# Patient Record
Sex: MALE | Race: WHITE | Employment: OTHER | URBAN - METROPOLITAN AREA
[De-identification: names, ages, dates, MRNs, and addresses within clinical notes are randomized per-mention and may not be internally consistent; named-entity substitution may affect disease eponyms.]

---

## 2018-12-04 ENCOUNTER — APPOINTMENT (OUTPATIENT)
Dept: CT IMAGING | Age: 83
DRG: 340 | End: 2018-12-04
Payer: MEDICARE

## 2018-12-04 ENCOUNTER — HOSPITAL ENCOUNTER (INPATIENT)
Age: 83
LOS: 3 days | Discharge: HOME | DRG: 340 | End: 2018-12-07
Attending: EMERGENCY MEDICINE | Admitting: SURGERY
Payer: MEDICARE

## 2018-12-04 DIAGNOSIS — K35.80 ACUTE APPENDICITIS, UNSPECIFIED ACUTE APPENDICITIS TYPE: Primary | ICD-10-CM

## 2018-12-04 DIAGNOSIS — E87.1 HYPONATREMIA: ICD-10-CM

## 2018-12-04 PROBLEM — K37 APPENDICITIS: Status: ACTIVE | Noted: 2018-12-04

## 2018-12-04 LAB
ALBUMIN SERPL-MCNC: 3.8 G/DL (ref 3.5–5.2)
ALP BLD-CCNC: 49 U/L (ref 40–129)
ALT SERPL-CCNC: 26 U/L (ref 0–40)
ANION GAP SERPL CALCULATED.3IONS-SCNC: 12 MMOL/L (ref 7–16)
AST SERPL-CCNC: 23 U/L (ref 0–39)
BACTERIA: ABNORMAL /HPF
BASOPHILS ABSOLUTE: 0.03 E9/L (ref 0–0.2)
BASOPHILS RELATIVE PERCENT: 0.3 % (ref 0–2)
BILIRUB SERPL-MCNC: 1.3 MG/DL (ref 0–1.2)
BILIRUBIN URINE: NEGATIVE
BLOOD, URINE: NEGATIVE
BUN BLDV-MCNC: 12 MG/DL (ref 8–23)
CALCIUM SERPL-MCNC: 8.5 MG/DL (ref 8.6–10.2)
CHLORIDE BLD-SCNC: 96 MMOL/L (ref 98–107)
CLARITY: CLEAR
CO2: 23 MMOL/L (ref 22–29)
COLOR: YELLOW
CREAT SERPL-MCNC: 1.2 MG/DL (ref 0.7–1.2)
EOSINOPHILS ABSOLUTE: 0 E9/L (ref 0.05–0.5)
EOSINOPHILS RELATIVE PERCENT: 0 % (ref 0–6)
GFR AFRICAN AMERICAN: >60
GFR NON-AFRICAN AMERICAN: 58 ML/MIN/1.73
GLUCOSE BLD-MCNC: 167 MG/DL (ref 74–99)
GLUCOSE URINE: NEGATIVE MG/DL
HCT VFR BLD CALC: 43.9 % (ref 37–54)
HEMOGLOBIN: 14.5 G/DL (ref 12.5–16.5)
IMMATURE GRANULOCYTES #: 0.05 E9/L
IMMATURE GRANULOCYTES %: 0.5 % (ref 0–5)
KETONES, URINE: 40 MG/DL
LACTIC ACID, SEPSIS: 1.7 MMOL/L (ref 0.5–1.9)
LEUKOCYTE ESTERASE, URINE: ABNORMAL
LIPASE: 20 U/L (ref 13–60)
LYMPHOCYTES ABSOLUTE: 0.95 E9/L (ref 1.5–4)
LYMPHOCYTES RELATIVE PERCENT: 10.1 % (ref 20–42)
MCH RBC QN AUTO: 30.9 PG (ref 26–35)
MCHC RBC AUTO-ENTMCNC: 33 % (ref 32–34.5)
MCV RBC AUTO: 93.6 FL (ref 80–99.9)
MONOCYTES ABSOLUTE: 0.5 E9/L (ref 0.1–0.95)
MONOCYTES RELATIVE PERCENT: 5.3 % (ref 2–12)
NEUTROPHILS ABSOLUTE: 7.83 E9/L (ref 1.8–7.3)
NEUTROPHILS RELATIVE PERCENT: 83.8 % (ref 43–80)
NITRITE, URINE: NEGATIVE
PDW BLD-RTO: 12.6 FL (ref 11.5–15)
PH UA: 6 (ref 5–9)
PLATELET # BLD: 128 E9/L (ref 130–450)
PMV BLD AUTO: 10.5 FL (ref 7–12)
POTASSIUM SERPL-SCNC: 3.8 MMOL/L (ref 3.5–5)
PROTEIN UA: NEGATIVE MG/DL
RBC # BLD: 4.69 E12/L (ref 3.8–5.8)
RBC UA: ABNORMAL /HPF (ref 0–2)
RENAL EPITHELIAL, UA: ABNORMAL /HPF
SODIUM BLD-SCNC: 131 MMOL/L (ref 132–146)
SPECIFIC GRAVITY UA: 1.02 (ref 1–1.03)
TOTAL PROTEIN: 6.3 G/DL (ref 6.4–8.3)
UROBILINOGEN, URINE: 0.2 E.U./DL
WBC # BLD: 9.4 E9/L (ref 4.5–11.5)
WBC UA: >20 /HPF (ref 0–5)
YEAST: ABNORMAL

## 2018-12-04 PROCEDURE — 80053 COMPREHEN METABOLIC PANEL: CPT

## 2018-12-04 PROCEDURE — 81001 URINALYSIS AUTO W/SCOPE: CPT

## 2018-12-04 PROCEDURE — 83690 ASSAY OF LIPASE: CPT

## 2018-12-04 PROCEDURE — 74177 CT ABD & PELVIS W/CONTRAST: CPT

## 2018-12-04 PROCEDURE — 85025 COMPLETE CBC W/AUTO DIFF WBC: CPT

## 2018-12-04 PROCEDURE — 6360000004 HC RX CONTRAST MEDICATION: Performed by: RADIOLOGY

## 2018-12-04 PROCEDURE — 36415 COLL VENOUS BLD VENIPUNCTURE: CPT

## 2018-12-04 PROCEDURE — 99285 EMERGENCY DEPT VISIT HI MDM: CPT

## 2018-12-04 PROCEDURE — 1200000000 HC SEMI PRIVATE

## 2018-12-04 PROCEDURE — 94760 N-INVAS EAR/PLS OXIMETRY 1: CPT

## 2018-12-04 PROCEDURE — 2580000003 HC RX 258: Performed by: EMERGENCY MEDICINE

## 2018-12-04 PROCEDURE — 83605 ASSAY OF LACTIC ACID: CPT

## 2018-12-04 PROCEDURE — 6360000002 HC RX W HCPCS: Performed by: EMERGENCY MEDICINE

## 2018-12-04 PROCEDURE — 2700000000 HC OXYGEN THERAPY PER DAY

## 2018-12-04 RX ADMIN — CEFTRIAXONE 1 G: 1 INJECTION, POWDER, FOR SOLUTION INTRAMUSCULAR; INTRAVENOUS at 23:38

## 2018-12-04 RX ADMIN — IOPAMIDOL 110 ML: 755 INJECTION, SOLUTION INTRAVENOUS at 22:51

## 2018-12-04 ASSESSMENT — PAIN DESCRIPTION - DESCRIPTORS: DESCRIPTORS: ACHING;CONSTANT;DISCOMFORT

## 2018-12-04 ASSESSMENT — PAIN SCALES - GENERAL: PAINLEVEL_OUTOF10: 3

## 2018-12-04 ASSESSMENT — PAIN DESCRIPTION - ONSET: ONSET: ON-GOING

## 2018-12-04 ASSESSMENT — PAIN DESCRIPTION - ORIENTATION: ORIENTATION: RIGHT

## 2018-12-04 ASSESSMENT — PAIN DESCRIPTION - LOCATION: LOCATION: FLANK;GROIN

## 2018-12-04 ASSESSMENT — PAIN DESCRIPTION - FREQUENCY: FREQUENCY: CONTINUOUS

## 2018-12-04 ASSESSMENT — PAIN DESCRIPTION - PAIN TYPE: TYPE: ACUTE PAIN

## 2018-12-04 ASSESSMENT — PAIN DESCRIPTION - PROGRESSION: CLINICAL_PROGRESSION: NOT CHANGED

## 2018-12-05 ENCOUNTER — ANESTHESIA (OUTPATIENT)
Dept: OPERATING ROOM | Age: 83
DRG: 340 | End: 2018-12-05
Payer: MEDICARE

## 2018-12-05 ENCOUNTER — APPOINTMENT (OUTPATIENT)
Dept: GENERAL RADIOLOGY | Age: 83
DRG: 340 | End: 2018-12-05
Payer: MEDICARE

## 2018-12-05 ENCOUNTER — ANESTHESIA EVENT (OUTPATIENT)
Dept: OPERATING ROOM | Age: 83
DRG: 340 | End: 2018-12-05
Payer: MEDICARE

## 2018-12-05 VITALS
DIASTOLIC BLOOD PRESSURE: 87 MMHG | RESPIRATION RATE: 8 BRPM | SYSTOLIC BLOOD PRESSURE: 152 MMHG | OXYGEN SATURATION: 96 %

## 2018-12-05 LAB
ABO/RH: NORMAL
ANION GAP SERPL CALCULATED.3IONS-SCNC: 12 MMOL/L (ref 7–16)
ANTIBODY SCREEN: NORMAL
BASOPHILS ABSOLUTE: 0.04 E9/L (ref 0–0.2)
BASOPHILS RELATIVE PERCENT: 0.3 % (ref 0–2)
BUN BLDV-MCNC: 17 MG/DL (ref 8–23)
CALCIUM SERPL-MCNC: 8.3 MG/DL (ref 8.6–10.2)
CHLORIDE BLD-SCNC: 99 MMOL/L (ref 98–107)
CO2: 25 MMOL/L (ref 22–29)
CREAT SERPL-MCNC: 1.5 MG/DL (ref 0.7–1.2)
EKG ATRIAL RATE: 84 BPM
EKG P AXIS: 12 DEGREES
EKG P-R INTERVAL: 178 MS
EKG Q-T INTERVAL: 390 MS
EKG QRS DURATION: 94 MS
EKG QTC CALCULATION (BAZETT): 460 MS
EKG R AXIS: -24 DEGREES
EKG T AXIS: 1 DEGREES
EKG VENTRICULAR RATE: 84 BPM
EOSINOPHILS ABSOLUTE: 0.05 E9/L (ref 0.05–0.5)
EOSINOPHILS RELATIVE PERCENT: 0.4 % (ref 0–6)
GFR AFRICAN AMERICAN: 54
GFR NON-AFRICAN AMERICAN: 45 ML/MIN/1.73
GLUCOSE BLD-MCNC: 155 MG/DL (ref 74–99)
HCT VFR BLD CALC: 42.5 % (ref 37–54)
HEMOGLOBIN: 13.8 G/DL (ref 12.5–16.5)
IMMATURE GRANULOCYTES #: 0.06 E9/L
IMMATURE GRANULOCYTES %: 0.5 % (ref 0–5)
LYMPHOCYTES ABSOLUTE: 2.56 E9/L (ref 1.5–4)
LYMPHOCYTES RELATIVE PERCENT: 20.3 % (ref 20–42)
MCH RBC QN AUTO: 30.7 PG (ref 26–35)
MCHC RBC AUTO-ENTMCNC: 32.5 % (ref 32–34.5)
MCV RBC AUTO: 94.7 FL (ref 80–99.9)
MONOCYTES ABSOLUTE: 1.03 E9/L (ref 0.1–0.95)
MONOCYTES RELATIVE PERCENT: 8.2 % (ref 2–12)
NEUTROPHILS ABSOLUTE: 8.86 E9/L (ref 1.8–7.3)
NEUTROPHILS RELATIVE PERCENT: 70.3 % (ref 43–80)
PDW BLD-RTO: 13 FL (ref 11.5–15)
PLATELET # BLD: 131 E9/L (ref 130–450)
PMV BLD AUTO: 10.8 FL (ref 7–12)
POTASSIUM SERPL-SCNC: 4.2 MMOL/L (ref 3.5–5)
RBC # BLD: 4.49 E12/L (ref 3.8–5.8)
SODIUM BLD-SCNC: 136 MMOL/L (ref 132–146)
WBC # BLD: 12.6 E9/L (ref 4.5–11.5)

## 2018-12-05 PROCEDURE — 2700000000 HC OXYGEN THERAPY PER DAY

## 2018-12-05 PROCEDURE — 3600000004 HC SURGERY LEVEL 4 BASE: Performed by: SURGERY

## 2018-12-05 PROCEDURE — 36415 COLL VENOUS BLD VENIPUNCTURE: CPT

## 2018-12-05 PROCEDURE — 87205 SMEAR GRAM STAIN: CPT

## 2018-12-05 PROCEDURE — 6360000002 HC RX W HCPCS: Performed by: NURSE ANESTHETIST, CERTIFIED REGISTERED

## 2018-12-05 PROCEDURE — 2720000010 HC SURG SUPPLY STERILE: Performed by: SURGERY

## 2018-12-05 PROCEDURE — 87081 CULTURE SCREEN ONLY: CPT

## 2018-12-05 PROCEDURE — 2500000003 HC RX 250 WO HCPCS: Performed by: NURSE ANESTHETIST, CERTIFIED REGISTERED

## 2018-12-05 PROCEDURE — 87015 SPECIMEN INFECT AGNT CONCNTJ: CPT

## 2018-12-05 PROCEDURE — 93005 ELECTROCARDIOGRAM TRACING: CPT

## 2018-12-05 PROCEDURE — 99223 1ST HOSP IP/OBS HIGH 75: CPT | Performed by: SURGERY

## 2018-12-05 PROCEDURE — 87070 CULTURE OTHR SPECIMN AEROBIC: CPT

## 2018-12-05 PROCEDURE — 87176 TISSUE HOMOGENIZATION CULTR: CPT

## 2018-12-05 PROCEDURE — 1200000000 HC SEMI PRIVATE

## 2018-12-05 PROCEDURE — 80048 BASIC METABOLIC PNL TOTAL CA: CPT

## 2018-12-05 PROCEDURE — 86901 BLOOD TYPING SEROLOGIC RH(D): CPT

## 2018-12-05 PROCEDURE — 2500000003 HC RX 250 WO HCPCS: Performed by: SURGERY

## 2018-12-05 PROCEDURE — 6360000002 HC RX W HCPCS: Performed by: STUDENT IN AN ORGANIZED HEALTH CARE EDUCATION/TRAINING PROGRAM

## 2018-12-05 PROCEDURE — 6360000002 HC RX W HCPCS: Performed by: INTERNAL MEDICINE

## 2018-12-05 PROCEDURE — 86900 BLOOD TYPING SEROLOGIC ABO: CPT

## 2018-12-05 PROCEDURE — 0DTJ4ZZ RESECTION OF APPENDIX, PERCUTANEOUS ENDOSCOPIC APPROACH: ICD-10-PCS | Performed by: SURGERY

## 2018-12-05 PROCEDURE — 88304 TISSUE EXAM BY PATHOLOGIST: CPT

## 2018-12-05 PROCEDURE — 6370000000 HC RX 637 (ALT 250 FOR IP): Performed by: EMERGENCY MEDICINE

## 2018-12-05 PROCEDURE — 3600000014 HC SURGERY LEVEL 4 ADDTL 15MIN: Performed by: SURGERY

## 2018-12-05 PROCEDURE — 71045 X-RAY EXAM CHEST 1 VIEW: CPT

## 2018-12-05 PROCEDURE — 86850 RBC ANTIBODY SCREEN: CPT

## 2018-12-05 PROCEDURE — 2780000010 HC IMPLANT OTHER: Performed by: SURGERY

## 2018-12-05 PROCEDURE — 3700000001 HC ADD 15 MINUTES (ANESTHESIA): Performed by: SURGERY

## 2018-12-05 PROCEDURE — 2580000003 HC RX 258: Performed by: INTERNAL MEDICINE

## 2018-12-05 PROCEDURE — 2709999900 HC NON-CHARGEABLE SUPPLY: Performed by: SURGERY

## 2018-12-05 PROCEDURE — 3700000000 HC ANESTHESIA ATTENDED CARE: Performed by: SURGERY

## 2018-12-05 PROCEDURE — 7100000001 HC PACU RECOVERY - ADDTL 15 MIN: Performed by: SURGERY

## 2018-12-05 PROCEDURE — 2580000003 HC RX 258: Performed by: SURGERY

## 2018-12-05 PROCEDURE — 2580000003 HC RX 258: Performed by: NURSE ANESTHETIST, CERTIFIED REGISTERED

## 2018-12-05 PROCEDURE — 87102 FUNGUS ISOLATION CULTURE: CPT

## 2018-12-05 PROCEDURE — 85025 COMPLETE CBC W/AUTO DIFF WBC: CPT

## 2018-12-05 PROCEDURE — 2500000003 HC RX 250 WO HCPCS: Performed by: EMERGENCY MEDICINE

## 2018-12-05 PROCEDURE — 87116 MYCOBACTERIA CULTURE: CPT

## 2018-12-05 PROCEDURE — 87075 CULTR BACTERIA EXCEPT BLOOD: CPT

## 2018-12-05 PROCEDURE — 2500000003 HC RX 250 WO HCPCS: Performed by: STUDENT IN AN ORGANIZED HEALTH CARE EDUCATION/TRAINING PROGRAM

## 2018-12-05 PROCEDURE — 87206 SMEAR FLUORESCENT/ACID STAI: CPT

## 2018-12-05 PROCEDURE — 44970 LAPAROSCOPY APPENDECTOMY: CPT | Performed by: SURGERY

## 2018-12-05 PROCEDURE — 7100000000 HC PACU RECOVERY - FIRST 15 MIN: Performed by: SURGERY

## 2018-12-05 RX ORDER — HYDROCODONE BITARTRATE AND ACETAMINOPHEN 5; 325 MG/1; MG/1
2 TABLET ORAL EVERY 4 HOURS PRN
Status: DISCONTINUED | OUTPATIENT
Start: 2018-12-05 | End: 2018-12-07 | Stop reason: HOSPADM

## 2018-12-05 RX ORDER — LIDOCAINE HYDROCHLORIDE 20 MG/ML
INJECTION, SOLUTION INFILTRATION; PERINEURAL PRN
Status: DISCONTINUED | OUTPATIENT
Start: 2018-12-05 | End: 2018-12-05 | Stop reason: SDUPTHER

## 2018-12-05 RX ORDER — ROCURONIUM BROMIDE 10 MG/ML
INJECTION, SOLUTION INTRAVENOUS PRN
Status: DISCONTINUED | OUTPATIENT
Start: 2018-12-05 | End: 2018-12-05 | Stop reason: SDUPTHER

## 2018-12-05 RX ORDER — FENTANYL CITRATE 50 UG/ML
INJECTION, SOLUTION INTRAMUSCULAR; INTRAVENOUS PRN
Status: DISCONTINUED | OUTPATIENT
Start: 2018-12-05 | End: 2018-12-05 | Stop reason: SDUPTHER

## 2018-12-05 RX ORDER — BUPIVACAINE HYDROCHLORIDE 2.5 MG/ML
INJECTION, SOLUTION EPIDURAL; INFILTRATION; INTRACAUDAL PRN
Status: DISCONTINUED | OUTPATIENT
Start: 2018-12-05 | End: 2018-12-05 | Stop reason: HOSPADM

## 2018-12-05 RX ORDER — HEPARIN SODIUM 10000 [USP'U]/ML
5000 INJECTION, SOLUTION INTRAVENOUS; SUBCUTANEOUS EVERY 8 HOURS SCHEDULED
Status: DISCONTINUED | OUTPATIENT
Start: 2018-12-05 | End: 2018-12-07 | Stop reason: HOSPADM

## 2018-12-05 RX ORDER — SODIUM CHLORIDE, SODIUM LACTATE, POTASSIUM CHLORIDE, CALCIUM CHLORIDE 600; 310; 30; 20 MG/100ML; MG/100ML; MG/100ML; MG/100ML
INJECTION, SOLUTION INTRAVENOUS CONTINUOUS PRN
Status: DISCONTINUED | OUTPATIENT
Start: 2018-12-05 | End: 2018-12-05 | Stop reason: SDUPTHER

## 2018-12-05 RX ORDER — NEOSTIGMINE METHYLSULFATE 1 MG/ML
INJECTION, SOLUTION INTRAVENOUS PRN
Status: DISCONTINUED | OUTPATIENT
Start: 2018-12-05 | End: 2018-12-05 | Stop reason: SDUPTHER

## 2018-12-05 RX ORDER — SODIUM CHLORIDE 0.9 % (FLUSH) 0.9 %
10 SYRINGE (ML) INJECTION EVERY 12 HOURS SCHEDULED
Status: DISCONTINUED | OUTPATIENT
Start: 2018-12-05 | End: 2018-12-07 | Stop reason: HOSPADM

## 2018-12-05 RX ORDER — ACETAMINOPHEN 325 MG/1
650 TABLET ORAL ONCE
Status: COMPLETED | OUTPATIENT
Start: 2018-12-05 | End: 2018-12-05

## 2018-12-05 RX ORDER — SODIUM CHLORIDE 9 MG/ML
INJECTION, SOLUTION INTRAVENOUS CONTINUOUS
Status: DISCONTINUED | OUTPATIENT
Start: 2018-12-05 | End: 2018-12-07

## 2018-12-05 RX ORDER — PROPOFOL 10 MG/ML
INJECTION, EMULSION INTRAVENOUS PRN
Status: DISCONTINUED | OUTPATIENT
Start: 2018-12-05 | End: 2018-12-05 | Stop reason: SDUPTHER

## 2018-12-05 RX ORDER — GLYCOPYRROLATE 0.2 MG/ML
INJECTION INTRAMUSCULAR; INTRAVENOUS PRN
Status: DISCONTINUED | OUTPATIENT
Start: 2018-12-05 | End: 2018-12-05 | Stop reason: SDUPTHER

## 2018-12-05 RX ORDER — ACETAMINOPHEN 325 MG/1
650 TABLET ORAL EVERY 4 HOURS PRN
Status: DISCONTINUED | OUTPATIENT
Start: 2018-12-05 | End: 2018-12-07 | Stop reason: HOSPADM

## 2018-12-05 RX ORDER — HYDROCODONE BITARTRATE AND ACETAMINOPHEN 5; 325 MG/1; MG/1
1 TABLET ORAL EVERY 4 HOURS PRN
Status: DISCONTINUED | OUTPATIENT
Start: 2018-12-05 | End: 2018-12-07 | Stop reason: HOSPADM

## 2018-12-05 RX ORDER — MORPHINE SULFATE 2 MG/ML
3 INJECTION, SOLUTION INTRAMUSCULAR; INTRAVENOUS
Status: DISCONTINUED | OUTPATIENT
Start: 2018-12-05 | End: 2018-12-07 | Stop reason: HOSPADM

## 2018-12-05 RX ORDER — MORPHINE SULFATE 2 MG/ML
2 INJECTION, SOLUTION INTRAMUSCULAR; INTRAVENOUS
Status: DISCONTINUED | OUTPATIENT
Start: 2018-12-05 | End: 2018-12-05

## 2018-12-05 RX ORDER — ONDANSETRON 2 MG/ML
4 INJECTION INTRAMUSCULAR; INTRAVENOUS EVERY 6 HOURS PRN
Status: DISCONTINUED | OUTPATIENT
Start: 2018-12-05 | End: 2018-12-07 | Stop reason: HOSPADM

## 2018-12-05 RX ORDER — MIDAZOLAM HYDROCHLORIDE 1 MG/ML
INJECTION INTRAMUSCULAR; INTRAVENOUS PRN
Status: DISCONTINUED | OUTPATIENT
Start: 2018-12-05 | End: 2018-12-05 | Stop reason: SDUPTHER

## 2018-12-05 RX ORDER — SODIUM CHLORIDE 0.9 % (FLUSH) 0.9 %
10 SYRINGE (ML) INJECTION PRN
Status: DISCONTINUED | OUTPATIENT
Start: 2018-12-05 | End: 2018-12-07 | Stop reason: HOSPADM

## 2018-12-05 RX ORDER — DEXAMETHASONE SODIUM PHOSPHATE 4 MG/ML
INJECTION, SOLUTION INTRA-ARTICULAR; INTRALESIONAL; INTRAMUSCULAR; INTRAVENOUS; SOFT TISSUE PRN
Status: DISCONTINUED | OUTPATIENT
Start: 2018-12-05 | End: 2018-12-05 | Stop reason: SDUPTHER

## 2018-12-05 RX ORDER — HYDROCODONE BITARTRATE AND ACETAMINOPHEN 5; 325 MG/1; MG/1
1 TABLET ORAL EVERY 6 HOURS PRN
Status: DISCONTINUED | OUTPATIENT
Start: 2018-12-05 | End: 2018-12-05

## 2018-12-05 RX ORDER — ONDANSETRON 2 MG/ML
INJECTION INTRAMUSCULAR; INTRAVENOUS PRN
Status: DISCONTINUED | OUTPATIENT
Start: 2018-12-05 | End: 2018-12-05 | Stop reason: SDUPTHER

## 2018-12-05 RX ADMIN — Medication 3 MG: at 15:10

## 2018-12-05 RX ADMIN — MIDAZOLAM HYDROCHLORIDE 1 MG: 1 INJECTION, SOLUTION INTRAMUSCULAR; INTRAVENOUS at 14:05

## 2018-12-05 RX ADMIN — METRONIDAZOLE 500 MG: 500 INJECTION, SOLUTION INTRAVENOUS at 23:54

## 2018-12-05 RX ADMIN — FENTANYL CITRATE 50 MCG: 50 INJECTION, SOLUTION INTRAMUSCULAR; INTRAVENOUS at 14:24

## 2018-12-05 RX ADMIN — SODIUM CHLORIDE: 9 INJECTION, SOLUTION INTRAVENOUS at 08:20

## 2018-12-05 RX ADMIN — GLYCOPYRROLATE 0.6 MG: 0.2 INJECTION, SOLUTION INTRAMUSCULAR; INTRAVENOUS at 15:10

## 2018-12-05 RX ADMIN — PROPOFOL 150 MG: 10 INJECTION, EMULSION INTRAVENOUS at 13:59

## 2018-12-05 RX ADMIN — METRONIDAZOLE 500 MG: 500 INJECTION, SOLUTION INTRAVENOUS at 16:46

## 2018-12-05 RX ADMIN — PROPOFOL 50 MG: 10 INJECTION, EMULSION INTRAVENOUS at 14:01

## 2018-12-05 RX ADMIN — METRONIDAZOLE 500 MG: 500 INJECTION, SOLUTION INTRAVENOUS at 00:25

## 2018-12-05 RX ADMIN — HEPARIN SODIUM 5000 UNITS: 10000 INJECTION INTRAVENOUS; SUBCUTANEOUS at 17:39

## 2018-12-05 RX ADMIN — SODIUM CHLORIDE, POTASSIUM CHLORIDE, SODIUM LACTATE AND CALCIUM CHLORIDE: 600; 310; 30; 20 INJECTION, SOLUTION INTRAVENOUS at 13:25

## 2018-12-05 RX ADMIN — SODIUM CHLORIDE, POTASSIUM CHLORIDE, SODIUM LACTATE AND CALCIUM CHLORIDE: 600; 310; 30; 20 INJECTION, SOLUTION INTRAVENOUS at 14:50

## 2018-12-05 RX ADMIN — FENTANYL CITRATE 50 MCG: 50 INJECTION, SOLUTION INTRAMUSCULAR; INTRAVENOUS at 14:29

## 2018-12-05 RX ADMIN — FENTANYL CITRATE 25 MCG: 50 INJECTION, SOLUTION INTRAMUSCULAR; INTRAVENOUS at 15:10

## 2018-12-05 RX ADMIN — SODIUM CHLORIDE: 9 INJECTION, SOLUTION INTRAVENOUS at 01:57

## 2018-12-05 RX ADMIN — ACETAMINOPHEN 650 MG: 325 TABLET ORAL at 19:34

## 2018-12-05 RX ADMIN — MIDAZOLAM HYDROCHLORIDE 1 MG: 1 INJECTION, SOLUTION INTRAMUSCULAR; INTRAVENOUS at 13:46

## 2018-12-05 RX ADMIN — ONDANSETRON HYDROCHLORIDE 4 MG: 2 INJECTION, SOLUTION INTRAMUSCULAR; INTRAVENOUS at 13:58

## 2018-12-05 RX ADMIN — FENTANYL CITRATE 50 MCG: 50 INJECTION, SOLUTION INTRAMUSCULAR; INTRAVENOUS at 14:17

## 2018-12-05 RX ADMIN — FENTANYL CITRATE 25 MCG: 50 INJECTION, SOLUTION INTRAMUSCULAR; INTRAVENOUS at 15:23

## 2018-12-05 RX ADMIN — DEXAMETHASONE SODIUM PHOSPHATE 10 MG: 4 INJECTION, SOLUTION INTRAMUSCULAR; INTRAVENOUS at 13:58

## 2018-12-05 RX ADMIN — FENTANYL CITRATE 50 MCG: 50 INJECTION, SOLUTION INTRAMUSCULAR; INTRAVENOUS at 13:59

## 2018-12-05 RX ADMIN — ACETAMINOPHEN 650 MG: 325 TABLET ORAL at 00:32

## 2018-12-05 RX ADMIN — ROCURONIUM BROMIDE 10 MG: 10 SOLUTION INTRAVENOUS at 14:20

## 2018-12-05 RX ADMIN — METRONIDAZOLE 500 MG: 500 INJECTION, SOLUTION INTRAVENOUS at 08:20

## 2018-12-05 RX ADMIN — CEFTRIAXONE SODIUM 2 G: 2 INJECTION, POWDER, FOR SOLUTION INTRAMUSCULAR; INTRAVENOUS at 22:52

## 2018-12-05 RX ADMIN — ROCURONIUM BROMIDE 35 MG: 10 SOLUTION INTRAVENOUS at 13:59

## 2018-12-05 RX ADMIN — LIDOCAINE HYDROCHLORIDE 100 MG: 20 INJECTION, SOLUTION INFILTRATION; PERINEURAL at 13:58

## 2018-12-05 ASSESSMENT — PULMONARY FUNCTION TESTS
PIF_VALUE: 0
PIF_VALUE: 45
PIF_VALUE: 0
PIF_VALUE: 45
PIF_VALUE: 25
PIF_VALUE: 26
PIF_VALUE: 4
PIF_VALUE: 41
PIF_VALUE: 26
PIF_VALUE: 45
PIF_VALUE: 3
PIF_VALUE: 1
PIF_VALUE: 2
PIF_VALUE: 44
PIF_VALUE: 31
PIF_VALUE: 26
PIF_VALUE: 38
PIF_VALUE: 43
PIF_VALUE: 0
PIF_VALUE: 26
PIF_VALUE: 3
PIF_VALUE: 43
PIF_VALUE: 29
PIF_VALUE: 45
PIF_VALUE: 43
PIF_VALUE: 36
PIF_VALUE: 44
PIF_VALUE: 32
PIF_VALUE: 25
PIF_VALUE: 26
PIF_VALUE: 25
PIF_VALUE: 45
PIF_VALUE: 43
PIF_VALUE: 28
PIF_VALUE: 44
PIF_VALUE: 1
PIF_VALUE: 2
PIF_VALUE: 31
PIF_VALUE: 26
PIF_VALUE: 2
PIF_VALUE: 43
PIF_VALUE: 38
PIF_VALUE: 28
PIF_VALUE: 44
PIF_VALUE: 2
PIF_VALUE: 20
PIF_VALUE: 1
PIF_VALUE: 2
PIF_VALUE: 43
PIF_VALUE: 25
PIF_VALUE: 44
PIF_VALUE: 35
PIF_VALUE: 24
PIF_VALUE: 45
PIF_VALUE: 4
PIF_VALUE: 41
PIF_VALUE: 44
PIF_VALUE: 25
PIF_VALUE: 45
PIF_VALUE: 20
PIF_VALUE: 0
PIF_VALUE: 25
PIF_VALUE: 44
PIF_VALUE: 39
PIF_VALUE: 3
PIF_VALUE: 44
PIF_VALUE: 42
PIF_VALUE: 0
PIF_VALUE: 11
PIF_VALUE: 44
PIF_VALUE: 1
PIF_VALUE: 43
PIF_VALUE: 44
PIF_VALUE: 33
PIF_VALUE: 45
PIF_VALUE: 40
PIF_VALUE: 26
PIF_VALUE: 25
PIF_VALUE: 44
PIF_VALUE: 40
PIF_VALUE: 38
PIF_VALUE: 43
PIF_VALUE: 44
PIF_VALUE: 21
PIF_VALUE: 1
PIF_VALUE: 26
PIF_VALUE: 1
PIF_VALUE: 18
PIF_VALUE: 44
PIF_VALUE: 45
PIF_VALUE: 41
PIF_VALUE: 45
PIF_VALUE: 45

## 2018-12-05 ASSESSMENT — PAIN SCALES - GENERAL
PAINLEVEL_OUTOF10: 1
PAINLEVEL_OUTOF10: 0
PAINLEVEL_OUTOF10: 3
PAINLEVEL_OUTOF10: 3
PAINLEVEL_OUTOF10: 0

## 2018-12-05 ASSESSMENT — PAIN DESCRIPTION - LOCATION
LOCATION: ABDOMEN
LOCATION: ABDOMEN

## 2018-12-05 ASSESSMENT — PAIN DESCRIPTION - PAIN TYPE
TYPE: SURGICAL PAIN
TYPE: SURGICAL PAIN

## 2018-12-05 ASSESSMENT — PAIN DESCRIPTION - FREQUENCY: FREQUENCY: CONTINUOUS

## 2018-12-05 ASSESSMENT — PAIN - FUNCTIONAL ASSESSMENT: PAIN_FUNCTIONAL_ASSESSMENT: 0-10

## 2018-12-05 ASSESSMENT — PAIN DESCRIPTION - DESCRIPTORS
DESCRIPTORS: ACHING;CONSTANT;DISCOMFORT
DESCRIPTORS: DISCOMFORT

## 2018-12-05 ASSESSMENT — PAIN DESCRIPTION - ONSET: ONSET: ON-GOING

## 2018-12-05 NOTE — H&P
Systems - History obtained from the patient  General ROS: negative for - chills or fever  Hematological and Lymphatic ROS: negative  Respiratory ROS: no cough, O2 at night  Cardiovascular ROS: no chest pain or dyspnea on exertion  Gastrointestinal ROS: positive for - abdominal pain  Genito-Urinary ROS: no dysuria, trouble voiding, or hematuria  Musculoskeletal ROS: negative        PHYSICAL EXAM:    Vitals:    12/05/18 0130   BP:    Pulse:    Resp:    Temp: 98.5 °F (36.9 °C)   SpO2:        General Appearance:  awake, alert, oriented,   Skin:  Skin color, texture, turgor normal. No rashes or lesions. Head/face:  NCAT  Eyes:  No gross abnormalities. Lungs:  No chest wall tenderness. Heart:  Heart regular rate and rhythm  Abdomen:  Soft RLQ tenderness w/ moderate voluntary guarding  Musculoskeletal  : pulses present in all extremities      LABS:  CBC  Recent Labs      12/04/18   2200   WBC  9.4   HGB  14.5   HCT  43.9   PLT  128*     BMP  Recent Labs      12/04/18 2200   NA  131*   K  3.8   CL  96*   CO2  23   BUN  12   CREATININE  1.2   CALCIUM  8.5*     Liver Function  Recent Labs      12/04/18   2200   LIPASE  20   BILITOT  1.3*   AST  23   ALT  26   ALKPHOS  49   PROT  6.3*   LABALBU  3.8     No results found for: LACTA      No results for input(s): INR, PTT in the last 72 hours. Invalid input(s): PT    RADIOLOGY    Ct Abdomen Pelvis W Iv Contrast Additional Contrast? None    Addendum Date: 12/4/2018    Initial report created on 12/4/2018 11:23:52 PM EST EXAM:  CT Abdomen and Pelvis With Intravenous Contrast EXAM DATE/TIME:  12/4/2018 9:15 PM CLINICAL HISTORY:  80years old, male; Pain; Abdominal pain;  Additional info: Flank pain, stone disease suspected TECHNIQUE:  Axial computed tomography images of the abdomen and pelvis with intravenous contrast.  All CT scans at this facility use at least one of these dose optimization techniques: automated exposure control; mA and/or kV adjustment per patient size

## 2018-12-05 NOTE — PROGRESS NOTES
Consult for ID actually going to Dr. Ryann Roa not Dr. Lily Michele per Answering Service   For perforated appendicitis purulent peritonitis.  Mario Sanchez  12/5/2018  4:59 PM

## 2018-12-05 NOTE — PATIENT CARE CONFERENCE
P Quality Flow/Interdisciplinary Rounds Progress Note        Quality Flow Rounds held on December 5, 2018    Disciplines Attending:  Bedside Nurse, ,  and Nursing Unit Leadership    BRISEYDA Limon was admitted on 12/4/2018  8:54 PM    Anticipated Discharge Date:  Expected Discharge Date: 12/08/18    Disposition:    Flavio Score:  Flavio Scale Score: 20    Readmission Risk              Risk of Unplanned Readmission:        7           Discussed patient goal for the day, patient clinical progression, and barriers to discharge. The following Goal(s) of the Day/Commitment(s) have been identified:  Adequate pain control/safety. Await surgical input for care.         Debra Page  December 5, 2018

## 2018-12-05 NOTE — ED NOTES
Bed: 16  Expected date:   Expected time:   Means of arrival:   Comments:  EMS     Lyndsey Blood RN  33/12/05 2054

## 2018-12-05 NOTE — ANESTHESIA PRE PROCEDURE
carcinoma of skin     Benign essential hypertension     Chronic kidney disease (CKD), stage III (moderate) (HCC)     Impaired fasting glucose     Osteoarthrosis involving multiple sites        Past Surgical History:        Procedure Laterality Date    SKIN BIOPSY  12/12/2005    right upper back,        Social History:    Social History   Substance Use Topics    Smoking status: Never Smoker    Smokeless tobacco: Former User     Types: Chew     Quit date: 4/10/2014    Alcohol use Yes      Comment: socially                                Counseling given: Not Answered      Vital Signs (Current):   Vitals:    12/05/18 0100 12/05/18 0130 12/05/18 0812 12/05/18 1202   BP: 124/69  (!) 121/59 (!) 108/55   Pulse: 97  89 80   Resp: 16  14 16   Temp: 100.9 °F (38.3 °C) 98.5 °F (36.9 °C) 97.9 °F (36.6 °C) 97.9 °F (36.6 °C)   TempSrc: Oral Oral Oral Temporal   SpO2: 93%  95% 91%   Weight:       Height:                                                  BP Readings from Last 3 Encounters:   12/05/18 (!) 108/55   06/10/14 138/88       NPO Status: Time of last liquid consumption: 1200                        Time of last solid consumption: 1200                        Date of last liquid consumption: 12/04/18                        Date of last solid food consumption: 12/04/18    BMI:   Wt Readings from Last 3 Encounters:   12/04/18 240 lb (108.9 kg)   06/10/14 244 lb 14.4 oz (111.1 kg)     Body mass index is 33.95 kg/m².     CBC:   Lab Results   Component Value Date    WBC 12.6 12/05/2018    RBC 4.49 12/05/2018    HGB 13.8 12/05/2018    HCT 42.5 12/05/2018    MCV 94.7 12/05/2018    RDW 13.0 12/05/2018     12/05/2018       CMP:   Lab Results   Component Value Date     12/05/2018    K 4.2 12/05/2018    CL 99 12/05/2018    CO2 25 12/05/2018    BUN 17 12/05/2018    CREATININE 1.5 12/05/2018    GFRAA 54 12/05/2018    LABGLOM 45 12/05/2018    GLUCOSE 155 12/05/2018    PROT 6.3 12/04/2018    CALCIUM 8.3

## 2018-12-06 LAB
ANION GAP SERPL CALCULATED.3IONS-SCNC: 13 MMOL/L (ref 7–16)
BASOPHILS ABSOLUTE: 0.02 E9/L (ref 0–0.2)
BASOPHILS RELATIVE PERCENT: 0.2 % (ref 0–2)
BUN BLDV-MCNC: 21 MG/DL (ref 8–23)
CALCIUM SERPL-MCNC: 8.5 MG/DL (ref 8.6–10.2)
CHLORIDE BLD-SCNC: 101 MMOL/L (ref 98–107)
CO2: 24 MMOL/L (ref 22–29)
CREAT SERPL-MCNC: 1.4 MG/DL (ref 0.7–1.2)
EOSINOPHILS ABSOLUTE: 0 E9/L (ref 0.05–0.5)
EOSINOPHILS RELATIVE PERCENT: 0 % (ref 0–6)
GFR AFRICAN AMERICAN: 58
GFR NON-AFRICAN AMERICAN: 48 ML/MIN/1.73
GLUCOSE BLD-MCNC: 150 MG/DL (ref 74–99)
GRAM STAIN ORDERABLE: NORMAL
HCT VFR BLD CALC: 40.1 % (ref 37–54)
HEMOGLOBIN: 13 G/DL (ref 12.5–16.5)
IMMATURE GRANULOCYTES #: 0.07 E9/L
IMMATURE GRANULOCYTES %: 0.6 % (ref 0–5)
LYMPHOCYTES ABSOLUTE: 1.53 E9/L (ref 1.5–4)
LYMPHOCYTES RELATIVE PERCENT: 13.9 % (ref 20–42)
MCH RBC QN AUTO: 30.8 PG (ref 26–35)
MCHC RBC AUTO-ENTMCNC: 32.4 % (ref 32–34.5)
MCV RBC AUTO: 95 FL (ref 80–99.9)
MONOCYTES ABSOLUTE: 0.74 E9/L (ref 0.1–0.95)
MONOCYTES RELATIVE PERCENT: 6.7 % (ref 2–12)
MRSA CULTURE ONLY: NORMAL
NEUTROPHILS ABSOLUTE: 8.68 E9/L (ref 1.8–7.3)
NEUTROPHILS RELATIVE PERCENT: 78.6 % (ref 43–80)
PDW BLD-RTO: 13 FL (ref 11.5–15)
PLATELET # BLD: 125 E9/L (ref 130–450)
PMV BLD AUTO: 11 FL (ref 7–12)
POTASSIUM SERPL-SCNC: 4.5 MMOL/L (ref 3.5–5)
RBC # BLD: 4.22 E12/L (ref 3.8–5.8)
SODIUM BLD-SCNC: 138 MMOL/L (ref 132–146)
WBC # BLD: 11 E9/L (ref 4.5–11.5)

## 2018-12-06 PROCEDURE — 2500000003 HC RX 250 WO HCPCS: Performed by: STUDENT IN AN ORGANIZED HEALTH CARE EDUCATION/TRAINING PROGRAM

## 2018-12-06 PROCEDURE — 6360000002 HC RX W HCPCS: Performed by: STUDENT IN AN ORGANIZED HEALTH CARE EDUCATION/TRAINING PROGRAM

## 2018-12-06 PROCEDURE — 99024 POSTOP FOLLOW-UP VISIT: CPT | Performed by: SURGERY

## 2018-12-06 PROCEDURE — 2580000003 HC RX 258: Performed by: INTERNAL MEDICINE

## 2018-12-06 PROCEDURE — 6360000002 HC RX W HCPCS: Performed by: INTERNAL MEDICINE

## 2018-12-06 PROCEDURE — 2580000003 HC RX 258: Performed by: SURGERY

## 2018-12-06 PROCEDURE — 36415 COLL VENOUS BLD VENIPUNCTURE: CPT

## 2018-12-06 PROCEDURE — 85025 COMPLETE CBC W/AUTO DIFF WBC: CPT

## 2018-12-06 PROCEDURE — 1200000000 HC SEMI PRIVATE

## 2018-12-06 PROCEDURE — 80048 BASIC METABOLIC PNL TOTAL CA: CPT

## 2018-12-06 RX ORDER — HYDROCODONE BITARTRATE AND ACETAMINOPHEN 5; 325 MG/1; MG/1
1 TABLET ORAL EVERY 6 HOURS PRN
Qty: 12 TABLET | Refills: 0 | Status: SHIPPED | OUTPATIENT
Start: 2018-12-06 | End: 2018-12-09

## 2018-12-06 RX ADMIN — HEPARIN SODIUM 5000 UNITS: 10000 INJECTION INTRAVENOUS; SUBCUTANEOUS at 14:21

## 2018-12-06 RX ADMIN — METRONIDAZOLE 500 MG: 500 INJECTION, SOLUTION INTRAVENOUS at 09:16

## 2018-12-06 RX ADMIN — HEPARIN SODIUM 5000 UNITS: 10000 INJECTION INTRAVENOUS; SUBCUTANEOUS at 06:14

## 2018-12-06 RX ADMIN — SODIUM CHLORIDE: 9 INJECTION, SOLUTION INTRAVENOUS at 09:17

## 2018-12-06 RX ADMIN — CEFTRIAXONE SODIUM 2 G: 2 INJECTION, POWDER, FOR SOLUTION INTRAMUSCULAR; INTRAVENOUS at 22:43

## 2018-12-06 RX ADMIN — HEPARIN SODIUM 5000 UNITS: 10000 INJECTION INTRAVENOUS; SUBCUTANEOUS at 22:43

## 2018-12-06 RX ADMIN — METRONIDAZOLE 500 MG: 500 INJECTION, SOLUTION INTRAVENOUS at 17:28

## 2018-12-06 ASSESSMENT — PAIN SCALES - GENERAL
PAINLEVEL_OUTOF10: 0

## 2018-12-07 ENCOUNTER — TELEPHONE (OUTPATIENT)
Dept: ADMINISTRATIVE | Age: 83
End: 2018-12-07

## 2018-12-07 VITALS
BODY MASS INDEX: 35.56 KG/M2 | DIASTOLIC BLOOD PRESSURE: 67 MMHG | HEIGHT: 71 IN | TEMPERATURE: 98.2 F | HEART RATE: 74 BPM | SYSTOLIC BLOOD PRESSURE: 128 MMHG | RESPIRATION RATE: 16 BRPM | OXYGEN SATURATION: 95 % | WEIGHT: 254 LBS

## 2018-12-07 LAB
ANION GAP SERPL CALCULATED.3IONS-SCNC: 14 MMOL/L (ref 7–16)
BASOPHILS ABSOLUTE: 0.02 E9/L (ref 0–0.2)
BASOPHILS RELATIVE PERCENT: 0.2 % (ref 0–2)
BUN BLDV-MCNC: 22 MG/DL (ref 8–23)
CALCIUM SERPL-MCNC: 7.9 MG/DL (ref 8.6–10.2)
CHLORIDE BLD-SCNC: 100 MMOL/L (ref 98–107)
CO2: 23 MMOL/L (ref 22–29)
CREAT SERPL-MCNC: 1.3 MG/DL (ref 0.7–1.2)
EOSINOPHILS ABSOLUTE: 0.07 E9/L (ref 0.05–0.5)
EOSINOPHILS RELATIVE PERCENT: 0.7 % (ref 0–6)
GFR AFRICAN AMERICAN: >60
GFR NON-AFRICAN AMERICAN: 53 ML/MIN/1.73
GLUCOSE BLD-MCNC: 138 MG/DL (ref 74–99)
HCT VFR BLD CALC: 40.1 % (ref 37–54)
HEMOGLOBIN: 13.2 G/DL (ref 12.5–16.5)
IMMATURE GRANULOCYTES #: 0.05 E9/L
IMMATURE GRANULOCYTES %: 0.5 % (ref 0–5)
LYMPHOCYTES ABSOLUTE: 2.65 E9/L (ref 1.5–4)
LYMPHOCYTES RELATIVE PERCENT: 25 % (ref 20–42)
MCH RBC QN AUTO: 31.2 PG (ref 26–35)
MCHC RBC AUTO-ENTMCNC: 32.9 % (ref 32–34.5)
MCV RBC AUTO: 94.8 FL (ref 80–99.9)
MONOCYTES ABSOLUTE: 0.89 E9/L (ref 0.1–0.95)
MONOCYTES RELATIVE PERCENT: 8.4 % (ref 2–12)
NEUTROPHILS ABSOLUTE: 6.93 E9/L (ref 1.8–7.3)
NEUTROPHILS RELATIVE PERCENT: 65.2 % (ref 43–80)
PDW BLD-RTO: 12.7 FL (ref 11.5–15)
PLATELET # BLD: 145 E9/L (ref 130–450)
PMV BLD AUTO: 11 FL (ref 7–12)
POTASSIUM SERPL-SCNC: 3.9 MMOL/L (ref 3.5–5)
RBC # BLD: 4.23 E12/L (ref 3.8–5.8)
SODIUM BLD-SCNC: 137 MMOL/L (ref 132–146)
WBC # BLD: 10.6 E9/L (ref 4.5–11.5)

## 2018-12-07 PROCEDURE — 80048 BASIC METABOLIC PNL TOTAL CA: CPT

## 2018-12-07 PROCEDURE — 85025 COMPLETE CBC W/AUTO DIFF WBC: CPT

## 2018-12-07 PROCEDURE — 6360000002 HC RX W HCPCS: Performed by: STUDENT IN AN ORGANIZED HEALTH CARE EDUCATION/TRAINING PROGRAM

## 2018-12-07 PROCEDURE — 2500000003 HC RX 250 WO HCPCS: Performed by: STUDENT IN AN ORGANIZED HEALTH CARE EDUCATION/TRAINING PROGRAM

## 2018-12-07 PROCEDURE — 6360000002 HC RX W HCPCS: Performed by: SURGERY

## 2018-12-07 PROCEDURE — 6370000000 HC RX 637 (ALT 250 FOR IP): Performed by: SURGERY

## 2018-12-07 PROCEDURE — 36415 COLL VENOUS BLD VENIPUNCTURE: CPT

## 2018-12-07 RX ORDER — METRONIDAZOLE 250 MG/1
250 TABLET ORAL 2 TIMES DAILY
Qty: 20 TABLET | Refills: 0 | Status: SHIPPED | OUTPATIENT
Start: 2018-12-07 | End: 2018-12-17

## 2018-12-07 RX ORDER — CIPROFLOXACIN 500 MG/1
500 TABLET, FILM COATED ORAL 2 TIMES DAILY
Qty: 20 TABLET | Refills: 0 | Status: SHIPPED | OUTPATIENT
Start: 2018-12-07 | End: 2018-12-17

## 2018-12-07 RX ORDER — BISACODYL 10 MG
10 SUPPOSITORY, RECTAL RECTAL ONCE
Status: COMPLETED | OUTPATIENT
Start: 2018-12-07 | End: 2018-12-07

## 2018-12-07 RX ADMIN — METRONIDAZOLE 500 MG: 500 INJECTION, SOLUTION INTRAVENOUS at 08:12

## 2018-12-07 RX ADMIN — HEPARIN SODIUM 5000 UNITS: 10000 INJECTION INTRAVENOUS; SUBCUTANEOUS at 05:53

## 2018-12-07 RX ADMIN — ONDANSETRON 4 MG: 2 INJECTION INTRAMUSCULAR; INTRAVENOUS at 12:06

## 2018-12-07 RX ADMIN — METRONIDAZOLE 500 MG: 500 INJECTION, SOLUTION INTRAVENOUS at 01:06

## 2018-12-07 RX ADMIN — HEPARIN SODIUM 5000 UNITS: 10000 INJECTION INTRAVENOUS; SUBCUTANEOUS at 13:50

## 2018-12-07 RX ADMIN — BISACODYL 10 MG: 10 SUPPOSITORY RECTAL at 08:21

## 2018-12-07 ASSESSMENT — PAIN SCALES - GENERAL: PAINLEVEL_OUTOF10: 0

## 2018-12-07 NOTE — CARE COORDINATION
12/7/2018  Social Work Discharge Planning:  SW was informed by Rigo Bennett at [facility] that they are unable to provide [facility] for Pt. ONELIA called several other [facility] agencies and was informed by Ophelia Valerio from [address] [place] [address] that their sister company, [facility] [phone]  [phone] does accept medicare and services the area of Pt if [facility] is needed. Refrral needs to be made once order is in. Electronically signed by ANA Webb on 12/7/2018 at 10:41 AM

## 2018-12-07 NOTE — PROGRESS NOTES
[address] [place] [address] [facility]  Progress Note    SUBJECTIVE:  Chief Complaint   Patient presents with    Flank Pain     R flank/groin pain, hx of kidney stones     Patient is tolerating medications. No reported adverse drug reactions. No nausea, vomiting, diarrhea. Review of systems:  As stated above in the chief complaint, otherwise negative. Medications:  Scheduled Meds:   sodium chloride flush  10 mL Intravenous 2 times per day    heparin (porcine)  5,000 Units Subcutaneous 3 times per day    metroNIDAZOLE  500 mg Intravenous Q8H    cefTRIAXone (ROCEPHIN) IV  2 g Intravenous Q24H     Continuous Infusions:  PRN Meds:magnesium hydroxide, sodium chloride flush, acetaminophen, ondansetron, morphine, HYDROcodone 5 mg - acetaminophen **OR** HYDROcodone 5 mg - acetaminophen    OBJECTIVE:  BP (!) 166/91   Pulse 79   Temp 98.4 °F (36.9 °C)   Resp 16   Ht 5' 10.5\" (1.791 m)   Wt 254 lb (115.2 kg)   SpO2 95%   BMI 35.93 kg/m²   Temp  Av.4 °F (36.9 °C)  Min: 98.2 °F (36.8 °C)  Max: 98.7 °F (37.1 °C)  Constitutional: The patient is awake, alert, and oriented. Skin: Warm and dry. No rashes were noted. HEENT: Eyes show round, and reactive pupils. No jaundice. Moist mucous membranes, no ulcerations, no thrush. Neck: Supple to movements. No lymphadenopathy. Chest: No use of accessory muscles to breathe. Symmetrical expansion. Auscultation reveals no wheezing, crackles, or rhonchi. Cardiovascular: S1 and S2 are rhythmic and regular. No murmurs appreciated. Abdomen: Positive bowel sounds to auscultation. Benign to palpation. No masses felt. No hepatosplenomegaly. Extremities: No clubbing, no cyanosis, no edema.   Lines: peripheral    Laboratory and Tests Review:  Lab Results   Component Value Date    WBC 10.6 2018    WBC 11.0 2018    WBC 12.6 (H) 2018    HGB 13.2 2018    HCT 40.1 2018    MCV 94.8 2018     2018     Lab Results Component Value Date    NEUTROABS 6.93 12/07/2018    NEUTROABS 8.68 (H) 12/06/2018    NEUTROABS 8.86 (H) 12/05/2018     No results found for: CRP  No results found for: CRPHS  No results found for: SEDRATE  Lab Results   Component Value Date    ALT 26 12/04/2018    AST 23 12/04/2018    ALKPHOS 49 12/04/2018    BILITOT 1.3 (H) 12/04/2018     Lab Results   Component Value Date     12/07/2018    K 3.9 12/07/2018     12/07/2018    CO2 23 12/07/2018    BUN 22 12/07/2018    CREATININE 1.3 12/07/2018    CREATININE 1.4 12/06/2018    CREATININE 1.5 12/05/2018    GFRAA >60 12/07/2018    LABGLOM 53 12/07/2018    GLUCOSE 138 12/07/2018    PROT 6.3 12/04/2018    LABALBU 3.8 12/04/2018    CALCIUM 7.9 12/07/2018    BILITOT 1.3 12/04/2018    ALKPHOS 49 12/04/2018    AST 23 12/04/2018    ALT 26 12/04/2018     Radiology:      Microbiology:   No new cultures  No results for input(s): BC in the last 72 hours. No results for input(s): ORG in the last 72 hours. No results for input(s): Marks Malena in the last 72 hours. No results for input(s): STREPNEUMAGU in the last 72 hours. No results for input(s): LP1UAG in the last 72 hours. No results for input(s): ASO in the last 72 hours. No results for input(s): CULTRESP in the last 72 hours. No results for input(s): LABURIN in the last 72 hours. Assessment:  · Peritonitis with acute appendicitis complicated with perforation s/p laparoscopic appendectomy on 12/5     Plan:    · Cont iv rocephin and flagyl day 3  · Can switch to po cipro and flagyl possiblyfor discharge--will see once he makes BM and takes good PO  · Monitor labs  · Sx cx neg  Will follow with you    Will follow back, please page if DC - ID to handle Mariano     Carolina Maria  9:52 AM  12/7/2018      I had a face-to-face encounter with the patient at the bedside. I agree with the nurse practitioner's note and assessment and plan as detailed above.  Necessary editing and changes made to the note by

## 2018-12-07 NOTE — CONSULTS
[address] [place] [address] [facility]    Consultation Note     Admit Date: 12/4/2018  8:54 PM    Reason for Consult:  Perforated appendix    Attending Physician:  Rhina Lee, *       Chief Complaint   Patient presents with    Flank Pain     R flank/groin pain, hx of kidney stones         History Obtained From:  For a detailed history please see previous and current available medical records. HISTORY OF PRESENT ILLNESS:             The patient is a 80 y.o. male admitted with acute R sided flank pain and ws foubd to have acute appendicitis with perforation. He underwent laparoscopic appendectomy on 12/5/18. He is passing gas and is tolerating solid food. no BM yet. Past Medical History:        Diagnosis Date    Basal cell carcinoma of skin     Benign essential hypertension     Chronic kidney disease (CKD), stage III (moderate) (HCC)     Impaired fasting glucose     Osteoarthrosis involving multiple sites      Past Surgical History:        Procedure Laterality Date    LAPAROSCOPIC APPENDECTOMY N/A 12/5/2018    LAPAROSCOPIC APPENDECTOMY performed by Rhina Lee MD at [address] [place] [address]  12/12/2005    right upper back,      Current Medications:   Scheduled Meds:   sodium chloride flush  10 mL Intravenous 2 times per day    heparin (porcine)  5,000 Units Subcutaneous 3 times per day    metroNIDAZOLE  500 mg Intravenous Q8H    cefTRIAXone (ROCEPHIN) IV  2 g Intravenous Q24H     Continuous Infusions:   sodium chloride 75 mL/hr at 12/06/18 0917     PRN Meds:sodium chloride flush, acetaminophen, ondansetron, morphine, HYDROcodone 5 mg - acetaminophen **OR** HYDROcodone 5 mg - acetaminophen    Allergies:  Ace inhibitors;  Norvasc [amlodipine besylate]; and Sulfa antibiotics    Social History:   Social History     Social History    Marital status:      Spouse name: N/A    Number of children: N/A    Years of education: N/A     Social History Main Topics    Smoking status: Never Smoker    Smokeless tobacco: Former User     Types: Chew     Quit date: 4/10/2014    Alcohol use Yes      Comment: socially    Drug use: No    Sexual activity: Not Asked     Other Topics Concern    None     Social History Narrative    None       Family History:   Family History   Problem Relation Age of Onset    Heart Disease Father     Heart Disease Son 62        myocarditis leading to heart transplant    Colon Cancer Mother 80   . Otherwise non-pertinent to the chief complaint.     REVIEW OF SYSTEMS:    14 ROS negative unless otherwise specified in the HPI    PHYSICAL EXAM:    Vitals:    BP (!) 154/78   Pulse 68   Temp 98.2 °F (36.8 °C) (Oral)   Resp 16   Ht 5' 10.5\" (1.791 m)   Wt 240 lb (108.9 kg)   SpO2 95%   BMI 33.95 kg/m²     General Appearance:    Alert, cooperative, no distress, appears stated age   Head:    Normocephalic, without obvious abnormality, atraumatic   Eyes:    PERRL, conjunctiva/corneas clear      Ears:    Normal and external ear canals, both ears   Nose:   Nares normal, septum midline, mucosa normal, no drainage    or sinus tenderness   Throat:   Lips, mucosa, and tongue normal; teeth and gums normal   Neck:   Supple, trachea midline, no adenopathy; no JVD   Lungs:     Clear to auscultation bilaterally, respirations unlabored   Chest wall:    No tenderness or deformity   Heart:    Regular rate and rhythm, S1 and S2 normal, no murmur, rub   or gallop   Abdomen:     Soft, non-tender, bowel sounds sluggish all four quadrants, drain with serosanguinous fluid,     no masses, no organomegaly   Extremities:   Extremities normal, atraumatic, no cyanosis or edema   Pulses:   2+ and symmetric all extremities   Skin:   Skin color, texture, turgor normal, no rashes or lesions       CBC+dif:  Reviewed and trend followed    Radiology:  Noted    Microbiology:  Pending      Assessment:  · Peritonitis with acute appendicitis complicated with perforation s/p

## 2018-12-08 LAB
ANAEROBIC CULTURE: ABNORMAL
ANAEROBIC CULTURE: ABNORMAL
CULTURE SURGICAL: NORMAL
GRAM STAIN RESULT: NORMAL
ORGANISM: ABNORMAL

## 2018-12-12 NOTE — DISCHARGE SUMMARY
CLINICAL HISTORY:  80years old, male; Pain; Abdominal pain; Additional info: Flank pain, stone disease suspected TECHNIQUE:  Axial computed tomography images of the abdomen and pelvis with intravenous contrast.  All CT scans at this facility use at least one of these dose optimization techniques: automated exposure control; mA and/or kV adjustment per patient size (includes targeted exams where dose is matched to clinical indication); or iterative reconstruction. Coronal and sagittal reformatted images were created and reviewed. CONTRAST:  110 ml of isovue administered intravenously. COMPARISON:  No relevant prior studies available. FINDINGS:  Lower thorax: No acute findings. ABDOMEN:  Liver:  Hepatic steatosis. Gallbladder and bile ducts:  Gallbladder is surgically absent. Pancreas: Normal.   Spleen: Normal.   Adrenals: Normal.   Kidneys and ureters:  Simple right renal cysts, largest measuring approximately 6.4 cm, for which no further evaluation is necessary. Stomach and bowel: Normal.  Appendix:  Appendix is abnormally dilated, measuring up to approximately 13 mm in maximal outer diameter. Mild appendiceal wall thickening and hyperenhancement, with moderate periappendiceal inflammatory stranding and small amount of fluid. Appendix is located in conventional position. No perforation or abscess. PELVIS:  Bladder: Unremarkable as visualized. Reproductive:  Prostate gland is enlarged, measuring 4.6 cm in AP diameter. ABDOMEN and PELVIS:  Intraperitoneal space: Normal. No free air. No significant fluid collection. Bones/joints:  Multilevel thoracolumbar spine degenerative changes. Bilateral L5 pars defects. Soft tissues:  Small bilateral fat-containing inguinal hernias, without acute complications. Vasculature:  Phleboliths within the pelvis. Atherosclerotic disease of the abdominal aorta and iliac arteries. Lymph nodes: Normal. No enlarged lymph nodes.      Acute appendicitis, without evidence of

## 2018-12-13 ENCOUNTER — OFFICE VISIT (OUTPATIENT)
Dept: SURGERY | Age: 83
End: 2018-12-13

## 2018-12-13 VITALS
DIASTOLIC BLOOD PRESSURE: 75 MMHG | WEIGHT: 239 LBS | OXYGEN SATURATION: 95 % | HEART RATE: 74 BPM | HEIGHT: 70 IN | TEMPERATURE: 98.8 F | BODY MASS INDEX: 34.22 KG/M2 | SYSTOLIC BLOOD PRESSURE: 117 MMHG | RESPIRATION RATE: 16 BRPM

## 2018-12-13 DIAGNOSIS — K35.30 ACUTE APPENDICITIS WITH LOCALIZED PERITONITIS, UNSPECIFIED WHETHER ABSCESS PRESENT, UNSPECIFIED WHETHER GANGRENE PRESENT, UNSPECIFIED WHETHER PERFORATION PRESENT: Primary | ICD-10-CM

## 2018-12-13 PROCEDURE — 99024 POSTOP FOLLOW-UP VISIT: CPT | Performed by: SURGERY

## 2018-12-13 RX ORDER — DIAPER,BRIEF,INFANT-TODD,DISP
EACH MISCELLANEOUS
Qty: 1 TUBE | Refills: 1 | Status: SHIPPED | OUTPATIENT
Start: 2018-12-13 | End: 2018-12-20

## 2019-01-07 LAB
FUNGUS (MYCOLOGY) CULTURE: NORMAL
FUNGUS STAIN: NORMAL

## 2019-01-22 LAB
AFB CULTURE (MYCOBACTERIA): NORMAL
AFB SMEAR: NORMAL

## 2020-11-27 ENCOUNTER — APPOINTMENT (OUTPATIENT)
Dept: GENERAL RADIOLOGY | Age: 85
DRG: 177 | End: 2020-11-27
Payer: MEDICARE

## 2020-11-27 ENCOUNTER — HOSPITAL ENCOUNTER (INPATIENT)
Age: 85
LOS: 13 days | Discharge: NURSING, REHAB OR LONG-TERM CARE | DRG: 177 | End: 2020-12-10
Attending: EMERGENCY MEDICINE | Admitting: INTERNAL MEDICINE
Payer: MEDICARE

## 2020-11-27 PROBLEM — U07.1 PNEUMONIA DUE TO COVID-19 VIRUS: Status: ACTIVE | Noted: 2020-11-27

## 2020-11-27 PROBLEM — J12.82 PNEUMONIA DUE TO COVID-19 VIRUS: Status: ACTIVE | Noted: 2020-11-27

## 2020-11-27 LAB
ADENOVIRUS BY PCR: NOT DETECTED
ALBUMIN SERPL-MCNC: 3.3 G/DL (ref 3.5–5.2)
ALP BLD-CCNC: 52 U/L (ref 40–129)
ALT SERPL-CCNC: 38 U/L (ref 0–40)
ANION GAP SERPL CALCULATED.3IONS-SCNC: 11 MMOL/L (ref 7–16)
APTT: 26.7 SEC (ref 24.5–35.1)
AST SERPL-CCNC: 64 U/L (ref 0–39)
BASOPHILS ABSOLUTE: 0.01 E9/L (ref 0–0.2)
BASOPHILS RELATIVE PERCENT: 0.2 % (ref 0–2)
BILIRUB SERPL-MCNC: 0.4 MG/DL (ref 0–1.2)
BORDETELLA PARAPERTUSSIS BY PCR: NOT DETECTED
BORDETELLA PERTUSSIS BY PCR: NOT DETECTED
BUN BLDV-MCNC: 15 MG/DL (ref 8–23)
C-REACTIVE PROTEIN: 5.6 MG/DL (ref 0–0.4)
CALCIUM SERPL-MCNC: 8.2 MG/DL (ref 8.6–10.2)
CHLAMYDOPHILIA PNEUMONIAE BY PCR: NOT DETECTED
CHLORIDE BLD-SCNC: 93 MMOL/L (ref 98–107)
CO2: 23 MMOL/L (ref 22–29)
CORONAVIRUS 229E BY PCR: NOT DETECTED
CORONAVIRUS HKU1 BY PCR: NOT DETECTED
CORONAVIRUS NL63 BY PCR: NOT DETECTED
CORONAVIRUS OC43 BY PCR: NOT DETECTED
CREAT SERPL-MCNC: 1.3 MG/DL (ref 0.7–1.2)
D DIMER: 688 NG/ML DDU
EKG ATRIAL RATE: 75 BPM
EKG P AXIS: 15 DEGREES
EKG P-R INTERVAL: 190 MS
EKG Q-T INTERVAL: 416 MS
EKG QRS DURATION: 108 MS
EKG QTC CALCULATION (BAZETT): 464 MS
EKG R AXIS: -32 DEGREES
EKG T AXIS: 5 DEGREES
EKG VENTRICULAR RATE: 75 BPM
EOSINOPHILS ABSOLUTE: 0 E9/L (ref 0.05–0.5)
EOSINOPHILS RELATIVE PERCENT: 0 % (ref 0–6)
FIBRINOGEN: 684 MG/DL (ref 225–540)
GFR AFRICAN AMERICAN: >60
GFR NON-AFRICAN AMERICAN: 52 ML/MIN/1.73
GLUCOSE BLD-MCNC: 191 MG/DL (ref 74–99)
HCT VFR BLD CALC: 43.3 % (ref 37–54)
HEMOGLOBIN: 14.3 G/DL (ref 12.5–16.5)
HUMAN METAPNEUMOVIRUS BY PCR: NOT DETECTED
HUMAN RHINOVIRUS/ENTEROVIRUS BY PCR: NOT DETECTED
IMMATURE GRANULOCYTES #: 0.03 E9/L
IMMATURE GRANULOCYTES %: 0.5 % (ref 0–5)
INFLUENZA A BY PCR: NOT DETECTED
INFLUENZA B BY PCR: NOT DETECTED
INR BLD: 0.9
LACTATE DEHYDROGENASE: 562 U/L (ref 135–225)
LACTIC ACID, SEPSIS: 2.9 MMOL/L (ref 0.5–1.9)
LACTIC ACID, SEPSIS: <0.2 MMOL/L (ref 0.5–1.9)
LYMPHOCYTES ABSOLUTE: 0.79 E9/L (ref 1.5–4)
LYMPHOCYTES RELATIVE PERCENT: 14.5 % (ref 20–42)
MCH RBC QN AUTO: 29.9 PG (ref 26–35)
MCHC RBC AUTO-ENTMCNC: 33 % (ref 32–34.5)
MCV RBC AUTO: 90.6 FL (ref 80–99.9)
MONOCYTES ABSOLUTE: 0.39 E9/L (ref 0.1–0.95)
MONOCYTES RELATIVE PERCENT: 7.1 % (ref 2–12)
MYCOPLASMA PNEUMONIAE BY PCR: NOT DETECTED
NEUTROPHILS ABSOLUTE: 4.24 E9/L (ref 1.8–7.3)
NEUTROPHILS RELATIVE PERCENT: 77.7 % (ref 43–80)
PARAINFLUENZA VIRUS 1 BY PCR: NOT DETECTED
PARAINFLUENZA VIRUS 2 BY PCR: NOT DETECTED
PARAINFLUENZA VIRUS 3 BY PCR: NOT DETECTED
PARAINFLUENZA VIRUS 4 BY PCR: NOT DETECTED
PDW BLD-RTO: 12.5 FL (ref 11.5–15)
PLATELET # BLD: 128 E9/L (ref 130–450)
PMV BLD AUTO: 11.1 FL (ref 7–12)
POTASSIUM REFLEX MAGNESIUM: 4 MMOL/L (ref 3.5–5)
PRO-BNP: 488 PG/ML (ref 0–450)
PROCALCITONIN: 0.19 NG/ML (ref 0–0.08)
PROTHROMBIN TIME: 10.8 SEC (ref 9.3–12.4)
RBC # BLD: 4.78 E12/L (ref 3.8–5.8)
RESPIRATORY SYNCYTIAL VIRUS BY PCR: NOT DETECTED
SARS-COV-2, PCR: DETECTED
SODIUM BLD-SCNC: 127 MMOL/L (ref 132–146)
TOTAL PROTEIN: 6.3 G/DL (ref 6.4–8.3)
TROPONIN: <0.01 NG/ML (ref 0–0.03)
WBC # BLD: 5.5 E9/L (ref 4.5–11.5)

## 2020-11-27 PROCEDURE — 84484 ASSAY OF TROPONIN QUANT: CPT

## 2020-11-27 PROCEDURE — 2060000000 HC ICU INTERMEDIATE R&B

## 2020-11-27 PROCEDURE — 6360000002 HC RX W HCPCS: Performed by: STUDENT IN AN ORGANIZED HEALTH CARE EDUCATION/TRAINING PROGRAM

## 2020-11-27 PROCEDURE — 99285 EMERGENCY DEPT VISIT HI MDM: CPT

## 2020-11-27 PROCEDURE — 96374 THER/PROPH/DIAG INJ IV PUSH: CPT

## 2020-11-27 PROCEDURE — 85610 PROTHROMBIN TIME: CPT

## 2020-11-27 PROCEDURE — 83605 ASSAY OF LACTIC ACID: CPT

## 2020-11-27 PROCEDURE — 83880 ASSAY OF NATRIURETIC PEPTIDE: CPT

## 2020-11-27 PROCEDURE — 93005 ELECTROCARDIOGRAM TRACING: CPT | Performed by: STUDENT IN AN ORGANIZED HEALTH CARE EDUCATION/TRAINING PROGRAM

## 2020-11-27 PROCEDURE — 85730 THROMBOPLASTIN TIME PARTIAL: CPT

## 2020-11-27 PROCEDURE — 2580000003 HC RX 258: Performed by: STUDENT IN AN ORGANIZED HEALTH CARE EDUCATION/TRAINING PROGRAM

## 2020-11-27 PROCEDURE — 86140 C-REACTIVE PROTEIN: CPT

## 2020-11-27 PROCEDURE — 85025 COMPLETE CBC W/AUTO DIFF WBC: CPT

## 2020-11-27 PROCEDURE — 6360000002 HC RX W HCPCS: Performed by: INTERNAL MEDICINE

## 2020-11-27 PROCEDURE — 2500000003 HC RX 250 WO HCPCS: Performed by: INTERNAL MEDICINE

## 2020-11-27 PROCEDURE — 85378 FIBRIN DEGRADE SEMIQUANT: CPT

## 2020-11-27 PROCEDURE — 80053 COMPREHEN METABOLIC PANEL: CPT

## 2020-11-27 PROCEDURE — 6370000000 HC RX 637 (ALT 250 FOR IP): Performed by: INTERNAL MEDICINE

## 2020-11-27 PROCEDURE — 87449 NOS EACH ORGANISM AG IA: CPT

## 2020-11-27 PROCEDURE — 71045 X-RAY EXAM CHEST 1 VIEW: CPT

## 2020-11-27 PROCEDURE — 6370000000 HC RX 637 (ALT 250 FOR IP): Performed by: STUDENT IN AN ORGANIZED HEALTH CARE EDUCATION/TRAINING PROGRAM

## 2020-11-27 PROCEDURE — 0202U NFCT DS 22 TRGT SARS-COV-2: CPT

## 2020-11-27 PROCEDURE — 99223 1ST HOSP IP/OBS HIGH 75: CPT | Performed by: INTERNAL MEDICINE

## 2020-11-27 PROCEDURE — 83615 LACTATE (LD) (LDH) ENZYME: CPT

## 2020-11-27 PROCEDURE — 2580000003 HC RX 258: Performed by: INTERNAL MEDICINE

## 2020-11-27 PROCEDURE — 85384 FIBRINOGEN ACTIVITY: CPT

## 2020-11-27 PROCEDURE — 84145 PROCALCITONIN (PCT): CPT

## 2020-11-27 PROCEDURE — 87040 BLOOD CULTURE FOR BACTERIA: CPT

## 2020-11-27 RX ORDER — PROMETHAZINE HYDROCHLORIDE 25 MG/1
12.5 TABLET ORAL EVERY 6 HOURS PRN
Status: DISCONTINUED | OUTPATIENT
Start: 2020-11-27 | End: 2020-12-10 | Stop reason: HOSPADM

## 2020-11-27 RX ORDER — SODIUM CHLORIDE 0.9 % (FLUSH) 0.9 %
10 SYRINGE (ML) INJECTION EVERY 12 HOURS SCHEDULED
Status: DISCONTINUED | OUTPATIENT
Start: 2020-11-27 | End: 2020-12-10 | Stop reason: HOSPADM

## 2020-11-27 RX ORDER — POLYETHYLENE GLYCOL 3350 17 G/17G
17 POWDER, FOR SOLUTION ORAL DAILY PRN
Status: DISCONTINUED | OUTPATIENT
Start: 2020-11-27 | End: 2020-12-10 | Stop reason: HOSPADM

## 2020-11-27 RX ORDER — ACETAMINOPHEN 650 MG/1
650 SUPPOSITORY RECTAL EVERY 6 HOURS PRN
Status: DISCONTINUED | OUTPATIENT
Start: 2020-11-27 | End: 2020-12-10 | Stop reason: HOSPADM

## 2020-11-27 RX ORDER — ZINC SULFATE 50(220)MG
50 CAPSULE ORAL 2 TIMES DAILY
Status: DISCONTINUED | OUTPATIENT
Start: 2020-11-27 | End: 2020-12-10 | Stop reason: HOSPADM

## 2020-11-27 RX ORDER — ACETAMINOPHEN 325 MG/1
650 TABLET ORAL EVERY 6 HOURS PRN
Status: DISCONTINUED | OUTPATIENT
Start: 2020-11-27 | End: 2020-12-10 | Stop reason: HOSPADM

## 2020-11-27 RX ORDER — ONDANSETRON 2 MG/ML
4 INJECTION INTRAMUSCULAR; INTRAVENOUS EVERY 6 HOURS PRN
Status: DISCONTINUED | OUTPATIENT
Start: 2020-11-27 | End: 2020-12-10 | Stop reason: HOSPADM

## 2020-11-27 RX ORDER — ACETAMINOPHEN 325 MG/1
650 TABLET ORAL ONCE
Status: COMPLETED | OUTPATIENT
Start: 2020-11-27 | End: 2020-11-27

## 2020-11-27 RX ORDER — 0.9 % SODIUM CHLORIDE 0.9 %
500 INTRAVENOUS SOLUTION INTRAVENOUS ONCE
Status: COMPLETED | OUTPATIENT
Start: 2020-11-27 | End: 2020-11-27

## 2020-11-27 RX ORDER — DEXAMETHASONE SODIUM PHOSPHATE 10 MG/ML
6 INJECTION, SOLUTION INTRAMUSCULAR; INTRAVENOUS ONCE
Status: COMPLETED | OUTPATIENT
Start: 2020-11-27 | End: 2020-11-27

## 2020-11-27 RX ORDER — ACETAMINOPHEN 325 MG/1
TABLET ORAL
Status: DISPENSED
Start: 2020-11-27 | End: 2020-11-27

## 2020-11-27 RX ORDER — SODIUM CHLORIDE 0.9 % (FLUSH) 0.9 %
10 SYRINGE (ML) INJECTION PRN
Status: DISCONTINUED | OUTPATIENT
Start: 2020-11-27 | End: 2020-12-10 | Stop reason: HOSPADM

## 2020-11-27 RX ORDER — VALSARTAN 320 MG/1
320 TABLET ORAL DAILY
Status: DISCONTINUED | OUTPATIENT
Start: 2020-11-27 | End: 2020-12-10

## 2020-11-27 RX ORDER — 0.9 % SODIUM CHLORIDE 0.9 %
30 INTRAVENOUS SOLUTION INTRAVENOUS PRN
Status: DISCONTINUED | OUTPATIENT
Start: 2020-11-27 | End: 2020-12-10 | Stop reason: HOSPADM

## 2020-11-27 RX ORDER — ASCORBIC ACID 500 MG
1000 TABLET ORAL DAILY
Status: DISCONTINUED | OUTPATIENT
Start: 2020-11-27 | End: 2020-12-10 | Stop reason: HOSPADM

## 2020-11-27 RX ORDER — HYDROCHLOROTHIAZIDE 12.5 MG/1
12.5 TABLET ORAL DAILY
Status: DISCONTINUED | OUTPATIENT
Start: 2020-11-27 | End: 2020-12-07

## 2020-11-27 RX ORDER — VALSARTAN AND HYDROCHLOROTHIAZIDE 320; 12.5 MG/1; MG/1
1 TABLET, FILM COATED ORAL DAILY
Status: DISCONTINUED | OUTPATIENT
Start: 2020-11-27 | End: 2020-11-27 | Stop reason: CLARIF

## 2020-11-27 RX ADMIN — REMDESIVIR 200 MG: 100 INJECTION, POWDER, LYOPHILIZED, FOR SOLUTION INTRAVENOUS at 19:53

## 2020-11-27 RX ADMIN — Medication 10 ML: at 20:08

## 2020-11-27 RX ADMIN — ACETAMINOPHEN 650 MG: 325 TABLET ORAL at 07:20

## 2020-11-27 RX ADMIN — ZINC SULFATE 220 MG (50 MG) CAPSULE 50 MG: CAPSULE at 20:07

## 2020-11-27 RX ADMIN — Medication 1000 MG: at 20:07

## 2020-11-27 RX ADMIN — VALSARTAN 320 MG: 320 TABLET ORAL at 20:27

## 2020-11-27 RX ADMIN — DEXAMETHASONE 6 MG: 4 TABLET ORAL at 20:07

## 2020-11-27 RX ADMIN — DEXAMETHASONE SODIUM PHOSPHATE 6 MG: 10 INJECTION, SOLUTION INTRAMUSCULAR; INTRAVENOUS at 07:20

## 2020-11-27 RX ADMIN — SODIUM CHLORIDE 500 ML: 9 INJECTION, SOLUTION INTRAVENOUS at 07:23

## 2020-11-27 RX ADMIN — ENOXAPARIN SODIUM 40 MG: 40 INJECTION SUBCUTANEOUS at 20:08

## 2020-11-27 RX ADMIN — HYDROCHLOROTHIAZIDE 12.5 MG: 12.5 TABLET ORAL at 20:07

## 2020-11-27 ASSESSMENT — ENCOUNTER SYMPTOMS
ABDOMINAL PAIN: 0
PHOTOPHOBIA: 0
CHEST TIGHTNESS: 0
SORE THROAT: 0
COUGH: 1
VOMITING: 0
ABDOMINAL DISTENTION: 0
NAUSEA: 0
SPUTUM PRODUCTION: 0
SHORTNESS OF BREATH: 1
WHEEZING: 0
DIARRHEA: 0

## 2020-11-27 ASSESSMENT — PAIN SCALES - GENERAL
PAINLEVEL_OUTOF10: 6
PAINLEVEL_OUTOF10: 8
PAINLEVEL_OUTOF10: 0

## 2020-11-27 ASSESSMENT — PAIN DESCRIPTION - LOCATION: LOCATION: HEAD;CHEST

## 2020-11-27 ASSESSMENT — PAIN DESCRIPTION - DESCRIPTORS: DESCRIPTORS: ACHING;CONSTANT;DISCOMFORT

## 2020-11-27 ASSESSMENT — PAIN DESCRIPTION - PAIN TYPE: TYPE: ACUTE PAIN

## 2020-11-27 ASSESSMENT — PAIN DESCRIPTION - ONSET: ONSET: SUDDEN

## 2020-11-27 ASSESSMENT — PAIN DESCRIPTION - FREQUENCY: FREQUENCY: CONTINUOUS

## 2020-11-27 ASSESSMENT — PAIN DESCRIPTION - PROGRESSION: CLINICAL_PROGRESSION: GRADUALLY WORSENING

## 2020-11-27 NOTE — ED PROVIDER NOTES
Pranav Varela is an 80-year-old male with PMH of HTN who presented to emergency department with shortness of breath that has been present for about a week and a half and is worsening. Patient has had subjective fevers and chills. Patient has had decreased appetite has had a 10 pound weight loss over the last week and a half. Patient is unable to eat or drink much. Patient typically uses CPAP at night for DILLAN. Patient has been using his CPAP during the day as his oxygen levels have been low. Patient is having chest pain from coughing. Patient denies nausea vomiting or abdominal pain. Patient was tested for Covid outpatient testing this past Wednesday. Patient does not smoke. Patient drinks alcohol occationally. The history is provided by the patient and medical records. Shortness of Breath   Severity:  Moderate  Onset quality:  Gradual  Duration:  10 days  Timing:  Constant  Progression:  Worsening  Context: URI    Relieved by:  Nothing  Worsened by:  Nothing  Ineffective treatments:  Rest and oxygen  Associated symptoms: chest pain, cough and fever    Associated symptoms: no abdominal pain, no diaphoresis, no headaches, no neck pain, no rash, no sore throat, no sputum production, no vomiting and no wheezing    Risk factors: no hx of PE/DVT, no prolonged immobilization, no recent surgery and no tobacco use         Review of Systems   Constitutional: Positive for appetite change, chills, fatigue and fever. Negative for diaphoresis. HENT: Negative for sore throat. Eyes: Negative for photophobia and visual disturbance. Respiratory: Positive for cough and shortness of breath. Negative for sputum production, chest tightness and wheezing. Cardiovascular: Positive for chest pain and leg swelling. Negative for palpitations. Gastrointestinal: Negative for abdominal distention, abdominal pain, diarrhea, nausea and vomiting. Genitourinary: Negative for dysuria.    Musculoskeletal: Negative for neck pain and neck stiffness. Skin: Negative for pallor and rash. Allergic/Immunologic: Negative for immunocompromised state. Neurological: Negative for headaches. Psychiatric/Behavioral: Negative for confusion. Physical Exam  Vitals signs and nursing note reviewed. Constitutional:       Appearance: Normal appearance. He is well-developed. He is ill-appearing. He is not diaphoretic. HENT:      Head: Normocephalic and atraumatic. Eyes:      Pupils: Pupils are equal, round, and reactive to light. Neck:      Musculoskeletal: Normal range of motion and neck supple. No neck rigidity or muscular tenderness. Cardiovascular:      Rate and Rhythm: Normal rate and regular rhythm. Pulmonary:      Effort: Pulmonary effort is normal. No respiratory distress. Breath sounds: No stridor. Comments: Mild coarse lung sounds    Chest:      Chest wall: No tenderness. Abdominal:      General: Bowel sounds are normal. There is no distension. Palpations: Abdomen is soft. Tenderness: There is no abdominal tenderness. There is no guarding or rebound. Musculoskeletal:      Right lower leg: Edema present. Left lower leg: Edema present. Skin:     General: Skin is warm and dry. Capillary Refill: Capillary refill takes less than 2 seconds. Coloration: Skin is not pale. Findings: No erythema or rash. Neurological:      Mental Status: He is alert and oriented to person, place, and time. Psychiatric:         Mood and Affect: Mood normal.          Procedures     MDM  Number of Diagnoses or Management Options  Acute respiratory failure with hypoxia ([address]): Hyponatremia:   Pneumonia due to COVID-19 virus:   Diagnosis management comments: Mica Rolon is an 80-year-old male who presented to the emergency room with shortness of breath. Patient was 88 to 89% on room air at rest at arrival to emergency department. Patient was 86% on RA in triage.   Patient was placed on 3 L nasal cannula with improvement of oxygen to 95%. Patient is febrile with a temperature of 100.6. Patient was given tylenol, decadron, IVF for symptoms with mild improvement. Patient has CPAP at home but is not on home oxygen. Patient will be admitted for acute respiratory failure secondary to covid infection  Discussed results and plan with patient he is agreeable to admission. Patient is stable and well appearing at time of evaluation not in any distress. ED Course as of Nov 27 1240 Fri Nov 27, 2020   1211 XR CHEST PORTABLE [JN]      ED Course User Index  [JN] Jillian Quinteros DO        ED Course as of Nov 27 2017 Fri Nov 27, 2020   1211 XR CHEST PORTABLE [JN]      ED Course User Index  [JN] Jillian Quinteros, DO       --------------------------------------------- PAST HISTORY ---------------------------------------------  Past Medical History:  has a past medical history of Basal cell carcinoma of skin, Benign essential hypertension, Chronic kidney disease (CKD), stage III (moderate), Impaired fasting glucose, and Osteoarthrosis involving multiple sites. Past Surgical History:  has a past surgical history that includes skin biopsy (12/12/2005) and laparoscopic appendectomy (N/A, 12/5/2018). Social History:  reports that he has never smoked. He quit smokeless tobacco use about 6 years ago. His smokeless tobacco use included chew. He reports current alcohol use. He reports that he does not use drugs. Family History: family history includes Colon Cancer (age of onset: 80) in his mother; Heart Disease in his father; Heart Disease (age of onset: 62) in his son. The patients home medications have been reviewed. Allergies: Ace inhibitors;  Norvasc [amlodipine besylate]; and Sulfa antibiotics    -------------------------------------------------- RESULTS -------------------------------------------------    LABS:  Results for orders placed or performed during the hospital encounter of 11/27/20 Respiratory Panel, Molecular, with COVID-19 (Restricted: peds pts or suitable admitted adults)    Specimen: Nasopharyngeal   Result Value Ref Range    Adenovirus by PCR Not Detected Not Detected    Bordetella parapertussis by PCR Not Detected Not Detected    Bordetella pertussis by PCR Not Detected Not Detected    Chlamydophilia pneumoniae by PCR Not Detected Not Detected    Coronavirus 229E by PCR Not Detected Not Detected    Coronavirus HKU1 by PCR Not Detected Not Detected    Coronavirus NL63 by PCR Not Detected Not Detected    Coronavirus OC43 by PCR Not Detected Not Detected    SARS-CoV-2, PCR DETECTED (A) Not Detected    Human Metapneumovirus by PCR Not Detected Not Detected    Human Rhinovirus/Enterovirus by PCR Not Detected Not Detected    Influenza A by PCR Not Detected Not Detected    Influenza B by PCR Not Detected Not Detected    Mycoplasma pneumoniae by PCR Not Detected Not Detected    Parainfluenza Virus 1 by PCR Not Detected Not Detected    Parainfluenza Virus 2 by PCR Not Detected Not Detected    Parainfluenza Virus 3 by PCR Not Detected Not Detected    Parainfluenza Virus 4 by PCR Not Detected Not Detected    Respiratory Syncytial Virus by PCR Not Detected Not Detected   Lactate, Sepsis   Result Value Ref Range    Lactic Acid, Sepsis <0.2 (L) 0.5 - 1.9 mmol/L   Lactate, Sepsis   Result Value Ref Range    Lactic Acid, Sepsis 2.9 (H) 0.5 - 1.9 mmol/L   CBC auto differential   Result Value Ref Range    WBC 5.5 4.5 - 11.5 E9/L    RBC 4.78 3.80 - 5.80 E12/L    Hemoglobin 14.3 12.5 - 16.5 g/dL    Hematocrit 43.3 37.0 - 54.0 %    MCV 90.6 80.0 - 99.9 fL    MCH 29.9 26.0 - 35.0 pg    MCHC 33.0 32.0 - 34.5 %    RDW 12.5 11.5 - 15.0 fL    Platelets 036 (L) 732 - 450 E9/L    MPV 11.1 7.0 - 12.0 fL    Neutrophils % 77.7 43.0 - 80.0 %    Immature Granulocytes % 0.5 0.0 - 5.0 %    Lymphocytes % 14.5 (L) 20.0 - 42.0 %    Monocytes % 7.1 2.0 - 12.0 %    Eosinophils % 0.0 0.0 - 6.0 %    Basophils % 0.2 0.0 - 2.0 % Neutrophils Absolute 4.24 1.80 - 7.30 E9/L    Immature Granulocytes # 0.03 E9/L    Lymphocytes Absolute 0.79 (L) 1.50 - 4.00 E9/L    Monocytes Absolute 0.39 0.10 - 0.95 E9/L    Eosinophils Absolute 0.00 (L) 0.05 - 0.50 E9/L    Basophils Absolute 0.01 0.00 - 0.20 E9/L   Comprehensive Metabolic Panel w/ Reflex to MG   Result Value Ref Range    Sodium 127 (L) 132 - 146 mmol/L    Potassium reflex Magnesium 4.0 3.5 - 5.0 mmol/L    Chloride 93 (L) 98 - 107 mmol/L    CO2 23 22 - 29 mmol/L    Anion Gap 11 7 - 16 mmol/L    Glucose 191 (H) 74 - 99 mg/dL    BUN 15 8 - 23 mg/dL    CREATININE 1.3 (H) 0.7 - 1.2 mg/dL    GFR Non-African American 52 >=60 mL/min/1.73    GFR African American >60     Calcium 8.2 (L) 8.6 - 10.2 mg/dL    Total Protein 6.3 (L) 6.4 - 8.3 g/dL    Alb 3.3 (L) 3.5 - 5.2 g/dL    Total Bilirubin 0.4 0.0 - 1.2 mg/dL    Alkaline Phosphatase 52 40 - 129 U/L    ALT 38 0 - 40 U/L    AST 64 (H) 0 - 39 U/L   Troponin   Result Value Ref Range    Troponin <0.01 0.00 - 0.03 ng/mL   Brain Natriuretic Peptide   Result Value Ref Range    Pro- (H) 0 - 450 pg/mL   Lactate Dehydrogenase   Result Value Ref Range     (H) 135 - 225 U/L   Fibrinogen   Result Value Ref Range    Fibrinogen 684 (H) 225 - 540 mg/dL   Protime-INR   Result Value Ref Range    Protime 10.8 9.3 - 12.4 sec    INR 0.9    APTT   Result Value Ref Range    aPTT 26.7 24.5 - 35.1 sec   D-Dimer, Quantitative   Result Value Ref Range    D-Dimer, Quant 688 ng/mL DDU   EKG 12 Lead   Result Value Ref Range    Ventricular Rate 75 BPM    Atrial Rate 75 BPM    P-R Interval 190 ms    QRS Duration 108 ms    Q-T Interval 416 ms    QTc Calculation (Bazett) 464 ms    P Axis 15 degrees    R Axis -32 degrees    T Axis 5 degrees       RADIOLOGY:  XR CHEST PORTABLE   Final Result   Bilateral airspace opacity may represent infectious process in the proper   clinical setting. EKG: This EKG is signed and interpreted by me.     Rate: 75  Rhythm: patient. This patient's ED course included: a personal history and physicial examination, re-evaluation prior to disposition, multiple bedside re-evaluations, IV medications and cardiac monitoring    This patient has remained hemodynamically stable during their ED course. Diagnosis:  1. Pneumonia due to COVID-19 virus    2. Hyponatremia    3. Acute respiratory failure with hypoxia (HCC)        Disposition:  Patient's disposition: Admit to telemetry  Patient's condition is stable.             Mj Lezama MD  Resident  11/27/20 7532

## 2020-11-27 NOTE — H&P
[facility] History and Physical    Admission Date  11/27/2020  6:21 AM  Chief Complaint Shortness of breath  Admit Dx   Pneumonia due to COVID-19 virus [U07.1, J12.89]    Subjective  History of Present Illness  Pt is an 86M w PMH HTN, CKD III, questionable COPD (wears 2.5-3LNC nightly) who presented to ED today with SOB worsening over the last ~10 days. Admits to fevers, denies fevers / chills / chest pain / nausea / diarrhea. Currently with SpO2 95% on 5LNC. In no distress. Review of Systems - 12-point review of systems has been reviewed and is otherwise negative except as listed in the HPI    Past Medical History:   Diagnosis Date    Basal cell carcinoma of skin     Benign essential hypertension     Chronic kidney disease (CKD), stage III (moderate)     Impaired fasting glucose     Osteoarthrosis involving multiple sites      Past Surgical History:   Procedure Laterality Date    LAPAROSCOPIC APPENDECTOMY N/A 12/5/2018    LAPAROSCOPIC APPENDECTOMY performed by Charlie Caballero MD at [address] [place] [address]  12/12/2005    right upper back,      Prior to Admission medications    Medication Sig Start Date End Date Taking? Authorizing Provider   valsartan-hydrochlorothiazide (DIOVAN-HCT) 320-12.5 MG per tablet Take 1 tablet by mouth daily. Historical Provider, MD     Allergies  Ace inhibitors; Norvasc [amlodipine besylate]; and Sulfa antibiotics    Social History   reports that he has never smoked. He quit smokeless tobacco use about 6 years ago. His smokeless tobacco use included chew. He reports current alcohol use. He reports that he does not use drugs. Family History  family history includes Colon Cancer (age of onset: 80) in his mother; Heart Disease in his father; Heart Disease (age of onset: 62) in his son.     Objective  Physical Exam   General: well-developed, well-nourished, no acute distress, cooperative  Skin: warm, dry, intact, normal color without

## 2020-11-27 NOTE — LETTER
Whats most important for you to know is that your Medicare rights and benefits wont change because your health care provider is participating in [address] [place]. Medicare will keep covering all of your medically necessary services. Even though Medicare will pay your doctor in a different way under [employer], how much you have to pay wont change. Health care providers and suppliers who are enrolled in Medicare will submit their Medicare claims like they always have. Youll have all the same Medicare rights and protections, including the right to choose which hospital, doctor, or other health care provider you see. If you dont want to get care from a health care provider whos participating in [address] [place], then youll have to choose a different health care provider whos not participating in the Model. How can I give feedback about my health care? Medicare might ask you to take a voluntary survey about the services and care you received from [address] [place] [address] during your hospital stay or outpatient procedure and for a specific period of time afterwards. You can decide whether you want to take the voluntary survey, but if you do, itll help Medicare make [employer] and the care of other Medicare patients better. If you have concerns or complaints about your care, you can:   · Talk to your doctor or health care provider. · Contact your Beneficiary and Family Centered Care Quality Improvement   Organization [facility]). You can get your [employer]s phone number  at  Medicare.gov/contacts or by calling 1-800-MEDICARE. TTY users can call  8-946.245.6012. Where can I learn more about [employer]? Learn more about [employer] at https://innovation.cms.gov/initiatives/bpci-advanced/:  · A list of all the hospitals and physician group practices in the country participating in [address] [place]. · All of the inpatient and outpatient Clinical Episodes that are currently included under [employer]. A Clinical Episode is a grouping of medical conditions or diagnoses that are included in the [address] [facility].

## 2020-11-28 LAB
ALBUMIN SERPL-MCNC: 3.1 G/DL (ref 3.5–5.2)
ALP BLD-CCNC: 50 U/L (ref 40–129)
ALT SERPL-CCNC: 34 U/L (ref 0–40)
ANION GAP SERPL CALCULATED.3IONS-SCNC: 15 MMOL/L (ref 7–16)
APTT: 28.9 SEC (ref 24.5–35.1)
AST SERPL-CCNC: 46 U/L (ref 0–39)
BILIRUB SERPL-MCNC: 0.3 MG/DL (ref 0–1.2)
BUN BLDV-MCNC: 16 MG/DL (ref 8–23)
CALCIUM SERPL-MCNC: 8.2 MG/DL (ref 8.6–10.2)
CHLORIDE BLD-SCNC: 99 MMOL/L (ref 98–107)
CO2: 19 MMOL/L (ref 22–29)
CREAT SERPL-MCNC: 1.1 MG/DL (ref 0.7–1.2)
D DIMER: 764 NG/ML DDU
FIBRINOGEN: 648 MG/DL (ref 225–540)
GFR AFRICAN AMERICAN: >60
GFR NON-AFRICAN AMERICAN: >60 ML/MIN/1.73
GLUCOSE BLD-MCNC: 214 MG/DL (ref 74–99)
HCT VFR BLD CALC: 45.7 % (ref 37–54)
HEMOGLOBIN: 14.8 G/DL (ref 12.5–16.5)
L. PNEUMOPHILA SEROGP 1 UR AG: NORMAL
LACTATE DEHYDROGENASE: 370 U/L (ref 135–225)
MCH RBC QN AUTO: 30.2 PG (ref 26–35)
MCHC RBC AUTO-ENTMCNC: 32.4 % (ref 32–34.5)
MCV RBC AUTO: 93.3 FL (ref 80–99.9)
PDW BLD-RTO: 12.6 FL (ref 11.5–15)
PLATELET # BLD: 149 E9/L (ref 130–450)
PMV BLD AUTO: 10.7 FL (ref 7–12)
POTASSIUM SERPL-SCNC: 4.1 MMOL/L (ref 3.5–5)
RBC # BLD: 4.9 E12/L (ref 3.8–5.8)
SODIUM BLD-SCNC: 133 MMOL/L (ref 132–146)
STREP PNEUMONIAE ANTIGEN, URINE: NORMAL
TOTAL PROTEIN: 6.3 G/DL (ref 6.4–8.3)
WBC # BLD: 5.4 E9/L (ref 4.5–11.5)

## 2020-11-28 PROCEDURE — 2580000003 HC RX 258: Performed by: INTERNAL MEDICINE

## 2020-11-28 PROCEDURE — 85378 FIBRIN DEGRADE SEMIQUANT: CPT

## 2020-11-28 PROCEDURE — 2060000000 HC ICU INTERMEDIATE R&B

## 2020-11-28 PROCEDURE — 6370000000 HC RX 637 (ALT 250 FOR IP): Performed by: INTERNAL MEDICINE

## 2020-11-28 PROCEDURE — 85384 FIBRINOGEN ACTIVITY: CPT

## 2020-11-28 PROCEDURE — 85027 COMPLETE CBC AUTOMATED: CPT

## 2020-11-28 PROCEDURE — 83615 LACTATE (LD) (LDH) ENZYME: CPT

## 2020-11-28 PROCEDURE — 6360000002 HC RX W HCPCS: Performed by: INTERNAL MEDICINE

## 2020-11-28 PROCEDURE — 36415 COLL VENOUS BLD VENIPUNCTURE: CPT

## 2020-11-28 PROCEDURE — 85730 THROMBOPLASTIN TIME PARTIAL: CPT

## 2020-11-28 PROCEDURE — XW033E5 INTRODUCTION OF REMDESIVIR ANTI-INFECTIVE INTO PERIPHERAL VEIN, PERCUTANEOUS APPROACH, NEW TECHNOLOGY GROUP 5: ICD-10-PCS | Performed by: INTERNAL MEDICINE

## 2020-11-28 PROCEDURE — 94660 CPAP INITIATION&MGMT: CPT

## 2020-11-28 PROCEDURE — 2500000003 HC RX 250 WO HCPCS: Performed by: INTERNAL MEDICINE

## 2020-11-28 PROCEDURE — 2700000000 HC OXYGEN THERAPY PER DAY

## 2020-11-28 PROCEDURE — 80053 COMPREHEN METABOLIC PANEL: CPT

## 2020-11-28 PROCEDURE — 99233 SBSQ HOSP IP/OBS HIGH 50: CPT | Performed by: INTERNAL MEDICINE

## 2020-11-28 RX ORDER — ALBUTEROL SULFATE 90 UG/1
2 AEROSOL, METERED RESPIRATORY (INHALATION) 3 TIMES DAILY
Status: DISCONTINUED | OUTPATIENT
Start: 2020-11-28 | End: 2020-12-10 | Stop reason: HOSPADM

## 2020-11-28 RX ADMIN — Medication 10 ML: at 21:19

## 2020-11-28 RX ADMIN — ZINC SULFATE 220 MG (50 MG) CAPSULE 50 MG: CAPSULE at 10:45

## 2020-11-28 RX ADMIN — ALBUTEROL SULFATE 2 PUFF: 90 AEROSOL, METERED RESPIRATORY (INHALATION) at 15:43

## 2020-11-28 RX ADMIN — Medication 10 ML: at 10:45

## 2020-11-28 RX ADMIN — ENOXAPARIN SODIUM 40 MG: 40 INJECTION SUBCUTANEOUS at 10:45

## 2020-11-28 RX ADMIN — ALBUTEROL SULFATE 2 PUFF: 90 AEROSOL, METERED RESPIRATORY (INHALATION) at 21:19

## 2020-11-28 RX ADMIN — ENOXAPARIN SODIUM 40 MG: 40 INJECTION SUBCUTANEOUS at 21:13

## 2020-11-28 RX ADMIN — DEXAMETHASONE 6 MG: 4 TABLET ORAL at 10:44

## 2020-11-28 RX ADMIN — VALSARTAN 320 MG: 320 TABLET ORAL at 10:45

## 2020-11-28 RX ADMIN — REMDESIVIR 100 MG: 100 INJECTION, POWDER, LYOPHILIZED, FOR SOLUTION INTRAVENOUS at 21:14

## 2020-11-28 RX ADMIN — Medication 1000 MG: at 10:45

## 2020-11-28 RX ADMIN — HYDROCHLOROTHIAZIDE 12.5 MG: 12.5 TABLET ORAL at 10:45

## 2020-11-28 RX ADMIN — ZINC SULFATE 220 MG (50 MG) CAPSULE 50 MG: CAPSULE at 21:13

## 2020-11-28 ASSESSMENT — PAIN SCALES - GENERAL: PAINLEVEL_OUTOF10: 0

## 2020-11-28 NOTE — PROGRESS NOTES
[facility]) Hospitalist Progress Note    Admission Date         11/27/2020  6:21 AM  Chief Complaint        Shortness of breath  Admit Dx                    Pneumonia due to COVID-19 virus [U07.1, J12.89]     Subjective  History of Present Illness  Pt is an 86M w PMH HTN, CKD III, questionable COPD (wears 2.5-3LNC nightly) who presented to ED 11/27 COVID-positive with SOB worsening over the last ~10 days. Admits to fevers, denies fevers / chills / chest pain / nausea / diarrhea. Required 5LNC in ED though needed to be escalated to BiPAP night of admission. Interim History  Seen at bedside. Had an issue overnight where he thought he was breaking out into sweats but turned out the water line to his BiPAP was leaking on him while he was sleeping. Had been on BiPAP this AM - pulm consulted - but on NRB during my exam in no distress. Review of Systems - 12-point review of systems has been reviewed and is otherwise negative except as listed in the HPI    Objective  Physical Exam  Vitals: BP (!) 134/57   Pulse 72   Temp 97.8 °F (36.6 °C) (Infrared)   Resp 22   Ht 5' 10.5\" (1.791 m)   Wt 245 lb (111.1 kg)   SpO2 93%   BMI 34.66 kg/m²   General: well-developed, well-nourished, no acute distress, cooperative  Skin: warm, dry, intact, normal color without cyanosis  HEENT: normocephalic, atraumatic, mucous membranes normal; HFNC in place  Respiratory: clear to auscultation bilaterally without respiratory distress  Cardiovascular: regular rate and rhythm without murmur / rub / gallop  Abdominal: soft, nontender, nondistended, normoactive bowel sounds  Extremities: no mottling, pulses intact, no edema  Neurologic: awake, alert, no focal deficits  Psychiatric: normal affect, cooperative    Assessment / Plan  Acute hypoxic respiratory failure due to COVID pneumonia - 86% on room air in triage. Does not meet sepsis criteria. RFA positive for COVID in ED and CXR showed bilateral airspace opacities.   SpO2 as needed, wean as able, start Decadron / vitamin C / zinc.  Consult to pharmacy to dose remdesivir; CMP showed AST 64 otherwise unremarkable. BMI >30 and CrCl 51; Lovenox 40 mg SQ bid for DVT ppx w plans for Eliquis 2.5 mg PO bid x30 days on dc.   Placed on BiPAP evening of 11/27 and pulm consulted, input reviewed and appreciated.     CKD III due to HTN - at baseline Cr 1.2-1.5; monitor on daily labs - 1.1 today     HTN - continue valsartan, HCTZ     Code status               Full  DVT prophylaxis       Lovenox  Disposition                Home    Electronically signed by Alayna Rivera DO on 11/28/2020 at 1:02 PM

## 2020-11-28 NOTE — PLAN OF CARE
Problem: Airway Clearance - Ineffective  Goal: Achieve or maintain patent airway  Outcome: Ongoing     Problem: Gas Exchange - Impaired  Goal: Absence of hypoxia  Outcome: Ongoing  Goal: Promote optimal lung function  Outcome: Ongoing     Problem: Breathing Pattern - Ineffective  Goal: Ability to achieve and maintain a regular respiratory rate  Outcome: Ongoing     Problem: Risk for Fluid Volume Deficit  Goal: Maintain normal heart rhythm  Outcome: Ongoing  Goal: Maintain normal serum potassium, sodium, calcium, phosphorus, and pH  Outcome: Ongoing     Problem: Fatigue  Goal: Verbalize increase energy and improved vitality  Outcome: Ongoing

## 2020-11-28 NOTE — CONSULTS
[facility] [address] [facility]  [address], [address]  [facility], [address] [place] [address]    Pulmonary Consultation      Reason for Consult:  sob    Requesting Physician:  Carisa Brothers MD    CHIEF COMPLAINT:  sob    History Obtained From:  patient    HISTORY OF PRESENT ILLNESS:                The patient is a 80 y.o. male with significant past medical history of DILLAN on CPAP who presents with increased sob and weakness for the past few weeks, mentions usually with a cough with not much sputum production, not sure if worse over past few weeks. Not usually on oxygen except at night with his CPAP 10. Got worse this morning with breathing when got up to clean up, currently on 15 li HFNC, feeling some better with breathing marii compared to admission, similar with cough, sitting up in bed when seen. Denies lung problems or lung medications as out-pt.     Past Medical History:        Diagnosis Date    Basal cell carcinoma of skin     Benign essential hypertension     Chronic kidney disease (CKD), stage III (moderate)     Impaired fasting glucose     Osteoarthrosis involving multiple sites        Past Surgical History:        Procedure Laterality Date    LAPAROSCOPIC APPENDECTOMY N/A 12/5/2018    LAPAROSCOPIC APPENDECTOMY performed by Charlie Caballero MD at [address] [place] [address]  12/12/2005    right upper back,        Current Medications:    Current Facility-Administered Medications: acetaminophen (TYLENOL) tablet 650 mg, 650 mg, Oral, Q6H PRN **OR** acetaminophen (TYLENOL) suppository 650 mg, 650 mg, Rectal, Q6H PRN  sodium chloride flush 0.9 % injection 10 mL, 10 mL, Intravenous, 2 times per day  sodium chloride flush 0.9 % injection 10 mL, 10 mL, Intravenous, PRN  acetaminophen (TYLENOL) tablet 650 mg, 650 mg, Oral, Q6H PRN **OR** acetaminophen (TYLENOL) suppository 650 mg, 650 mg, Rectal, Q6H PRN  polyethylene glycol (GLYCOLAX) packet 17 g, 17 g, Oral, Daily PRN  promethazine (PHENERGAN) tablet 12.5 mg, 12.5 mg, Oral, Q6H PRN **OR** ondansetron (ZOFRAN) injection 4 mg, 4 mg, Intravenous, Q6H PRN  enoxaparin (LOVENOX) injection 40 mg, 40 mg, Subcutaneous, BID  vitamin C (ASCORBIC ACID) tablet 1,000 mg, 1,000 mg, Oral, Daily  zinc sulfate (ZINCATE) capsule 50 mg, 50 mg, Oral, BID  dexamethasone (DECADRON) tablet 6 mg, 6 mg, Oral, Daily  valsartan (DIOVAN) tablet 320 mg, 320 mg, Oral, Daily **AND** hydroCHLOROthiazide (HYDRODIURIL) tablet 12.5 mg, 12.5 mg, Oral, Daily  [COMPLETED] remdesivir 200 mg in sodium chloride 0.9 % 250 mL IVPB, 200 mg, Intravenous, Once **FOLLOWED BY** remdesivir 100 mg in sodium chloride 0.9 % 250 mL IVPB, 100 mg, Intravenous, Q24H  0.9 % sodium chloride bolus, 30 mL, Intravenous, PRN    Allergies:  Ace inhibitors; Norvasc [amlodipine besylate]; and Sulfa antibiotics    Social History:    TOBACCO:   reports that he has never smoked. He quit smokeless tobacco use about 6 years ago. His smokeless tobacco use included chew. Family History:       Problem Relation Age of Onset    Heart Disease Father     Heart Disease Son 62        myocarditis leading to heart transplant    Colon Cancer Mother 80       REVIEW OF SYSTEMS:    RESPIRATORY:  Sob and cough  Remainder of complete ROS is negative. PHYSICAL EXAM:      Vitals:    BP (!) 134/57   Pulse 72   Temp 97.8 °F (36.6 °C) (Infrared)   Resp 22   Ht 5' 10.5\" (1.791 m)   Wt 245 lb (111.1 kg)   SpO2 93%   BMI 34.66 kg/m²     CONSTITUTIONAL:  obese  EYES:  Lids and lashes normal, pupils equal, round and reactive to light, extra ocular muscles intact, sclera clear, conjunctiva normal  ENT:  Normocephalic, without obvious abnormality, atraumatic, sinuses nontender on palpation, external ears without lesions, oral pharynx with moist mucus membranes, tonsils without erythema or exudates, gums normal and good dentition.   NECK:  Supple, symmetrical, trachea midline, no adenopathy, thyroid symmetric, not enlarged and no tenderness, skin normal  LUNGS:  ronchi bilateral worse with cough  CARDIOVASCULAR:  Normal apical impulse, regular rate and rhythm, normal S1 and S2, no S3 or S4, and no murmur noted  ABDOMEN:  No scars, normal bowel sounds, soft, non-distended, non-tender, no masses palpated, no hepatosplenomegally  MUSCULOSKELETAL:  There is no redness, warmth, or swelling of the joints. Full range of motion noted. Motor strength is 5 out of 5 all extremities bilaterally. Tone is normal.  NEUROLOGIC:  Awake, alert, oriented to name, place and time. Cranial nerves II-XII are grossly intact. DATA:    CBC:   Recent Labs     11/27/20  0719 11/28/20  0508   WBC 5.5 5.4   HGB 14.3 14.8   HCT 43.3 45.7   MCV 90.6 93.3   * 149       BMP:  Recent Labs     11/27/20  0719 11/28/20  0508   * 133   K 4.0 4.1   CL 93* 99   CO2 23 19*   BUN 15 16   CREATININE 1.3* 1.1    ALB:3,BILIDIR:3,BILITOT:3,ALKPHOS:3)@    PT/INR:   Recent Labs     11/27/20  1603   PROTIME 10.8   INR 0.9       ABG:   No results for input(s): PH, PO2, PCO2, HCO3, BE, O2SAT, METHB, O2HB, COHB, O2CON, HHB, THB in the last 72 hours. FiO2 : 70 %       Radiology Review:  CXR reviewed with slightly increased interstitial/ground glass opacities bilateral lower lung fields    IMPRESSION/RECOMMENDATIONS:      COVID  Hypoxia  DILLAN      1. Cont with remdesivir and decadron  2. Albuterol mdi tid, observe respiratory function  3. Incentive spirometer  4. Cont with CPAP at 10, can switch over to BIPAP if doesn't maintain oxygenation, will need oxygen bleed in to maintain oxygenation  5. OOb to chair, ambulate with assistance      Time at the bedside, reviewing labs and radiographs, reviewing notes and consultations, discussing with staff and family was more than 50 minutes. Thanks for letting us see this patient in consultation. Please contact us with any questions. Office [phone] or after hours through [employer], x [phone].

## 2020-11-28 NOTE — PROGRESS NOTES
Pt placed on 2L for Sat 89% with conversation. Pt wears CPAP at home. Will need order for here. When I asked pt what his settings were, he said 5 and 2.5.

## 2020-11-28 NOTE — PROGRESS NOTES
Assessed pt's Spo2 was 88% on 2 L at 2015, 88% on 4 L at 2108, 88% on 6 L at 2350 . Bipap on and pt's spo2 was 97% at 0055. Continue to monitor pt's condition.

## 2020-11-29 LAB
ALBUMIN SERPL-MCNC: 3 G/DL (ref 3.5–5.2)
ALP BLD-CCNC: 55 U/L (ref 40–129)
ALT SERPL-CCNC: 32 U/L (ref 0–40)
ANION GAP SERPL CALCULATED.3IONS-SCNC: 11 MMOL/L (ref 7–16)
AST SERPL-CCNC: 40 U/L (ref 0–39)
BILIRUB SERPL-MCNC: 0.3 MG/DL (ref 0–1.2)
BUN BLDV-MCNC: 22 MG/DL (ref 8–23)
CALCIUM SERPL-MCNC: 8.3 MG/DL (ref 8.6–10.2)
CHLORIDE BLD-SCNC: 99 MMOL/L (ref 98–107)
CO2: 23 MMOL/L (ref 22–29)
CREAT SERPL-MCNC: 1.1 MG/DL (ref 0.7–1.2)
GFR AFRICAN AMERICAN: >60
GFR NON-AFRICAN AMERICAN: >60 ML/MIN/1.73
GLUCOSE BLD-MCNC: 254 MG/DL (ref 74–99)
HCT VFR BLD CALC: 41.6 % (ref 37–54)
HEMOGLOBIN: 14.2 G/DL (ref 12.5–16.5)
MCH RBC QN AUTO: 30.7 PG (ref 26–35)
MCHC RBC AUTO-ENTMCNC: 34.1 % (ref 32–34.5)
MCV RBC AUTO: 90 FL (ref 80–99.9)
PDW BLD-RTO: 12.6 FL (ref 11.5–15)
PLATELET # BLD: 190 E9/L (ref 130–450)
PMV BLD AUTO: 10.7 FL (ref 7–12)
POTASSIUM SERPL-SCNC: 4.2 MMOL/L (ref 3.5–5)
RBC # BLD: 4.62 E12/L (ref 3.8–5.8)
SODIUM BLD-SCNC: 133 MMOL/L (ref 132–146)
TOTAL PROTEIN: 6 G/DL (ref 6.4–8.3)
WBC # BLD: 8.6 E9/L (ref 4.5–11.5)

## 2020-11-29 PROCEDURE — 6370000000 HC RX 637 (ALT 250 FOR IP): Performed by: INTERNAL MEDICINE

## 2020-11-29 PROCEDURE — 2700000000 HC OXYGEN THERAPY PER DAY

## 2020-11-29 PROCEDURE — 99233 SBSQ HOSP IP/OBS HIGH 50: CPT | Performed by: INTERNAL MEDICINE

## 2020-11-29 PROCEDURE — 85027 COMPLETE CBC AUTOMATED: CPT

## 2020-11-29 PROCEDURE — 94660 CPAP INITIATION&MGMT: CPT

## 2020-11-29 PROCEDURE — 2060000000 HC ICU INTERMEDIATE R&B

## 2020-11-29 PROCEDURE — 6360000002 HC RX W HCPCS: Performed by: INTERNAL MEDICINE

## 2020-11-29 PROCEDURE — 2500000003 HC RX 250 WO HCPCS: Performed by: INTERNAL MEDICINE

## 2020-11-29 PROCEDURE — 80053 COMPREHEN METABOLIC PANEL: CPT

## 2020-11-29 PROCEDURE — 2580000003 HC RX 258: Performed by: INTERNAL MEDICINE

## 2020-11-29 PROCEDURE — 36415 COLL VENOUS BLD VENIPUNCTURE: CPT

## 2020-11-29 RX ADMIN — ALBUTEROL SULFATE 2 PUFF: 90 AEROSOL, METERED RESPIRATORY (INHALATION) at 09:40

## 2020-11-29 RX ADMIN — ZINC SULFATE 220 MG (50 MG) CAPSULE 50 MG: CAPSULE at 09:41

## 2020-11-29 RX ADMIN — ENOXAPARIN SODIUM 40 MG: 40 INJECTION SUBCUTANEOUS at 09:41

## 2020-11-29 RX ADMIN — ALBUTEROL SULFATE 2 PUFF: 90 AEROSOL, METERED RESPIRATORY (INHALATION) at 14:40

## 2020-11-29 RX ADMIN — VALSARTAN 320 MG: 320 TABLET ORAL at 09:41

## 2020-11-29 RX ADMIN — DEXAMETHASONE 6 MG: 4 TABLET ORAL at 09:41

## 2020-11-29 RX ADMIN — Medication 10 ML: at 21:35

## 2020-11-29 RX ADMIN — ZINC SULFATE 220 MG (50 MG) CAPSULE 50 MG: CAPSULE at 21:34

## 2020-11-29 RX ADMIN — ALBUTEROL SULFATE 2 PUFF: 90 AEROSOL, METERED RESPIRATORY (INHALATION) at 22:00

## 2020-11-29 RX ADMIN — ENOXAPARIN SODIUM 40 MG: 40 INJECTION SUBCUTANEOUS at 21:34

## 2020-11-29 RX ADMIN — Medication 10 ML: at 09:42

## 2020-11-29 RX ADMIN — REMDESIVIR 100 MG: 100 INJECTION, POWDER, LYOPHILIZED, FOR SOLUTION INTRAVENOUS at 21:37

## 2020-11-29 RX ADMIN — HYDROCHLOROTHIAZIDE 12.5 MG: 12.5 TABLET ORAL at 09:41

## 2020-11-29 RX ADMIN — Medication 1000 MG: at 09:41

## 2020-11-29 ASSESSMENT — PAIN SCALES - GENERAL
PAINLEVEL_OUTOF10: 0
PAINLEVEL_OUTOF10: 0

## 2020-11-29 NOTE — PROGRESS NOTES
[facility]) Hospitalist Progress Note    Admission Date         11/27/2020  6:21 AM  Chief Complaint        Shortness of breath  Admit Dx                    Pneumonia due to COVID-19 virus [U07.1, J12.89]     Subjective  History of Present Illness  Pt is an 86M w PMH HTN, CKD III, questionable COPD (wears 2.5-3LNC nightly) who presented to ED 11/27 COVID-positive with SOB worsening over the last ~10 days. Admits to fevers, denies fevers / chills / chest pain / nausea / diarrhea. Required 5LNC in ED though needed to be escalated to BiPAP night of admission. Interim History  Seen at bedside, lying down at the suggestion of pulmonary. On 15L HFNC today, is tired and short of breath but no new issues otherwise. Review of Systems - 12-point review of systems has been reviewed and is otherwise negative except as listed in the HPI    Objective  Physical Exam  Vitals: /71   Pulse 61   Temp 97.7 °F (36.5 °C) (Infrared)   Resp 20   Ht 5' 10.5\" (1.791 m)   Wt 245 lb (111.1 kg)   SpO2 93%   BMI 34.66 kg/m²   General: well-developed, well-nourished, no acute distress, cooperative  Skin: warm, dry, intact, normal color without cyanosis  HEENT: normocephalic, atraumatic, mucous membranes normal; HFNC in place  Respiratory: clear to auscultation bilaterally without respiratory distress  Cardiovascular: regular rate and rhythm without murmur / rub / gallop  Abdominal: soft, nontender, nondistended, normoactive bowel sounds  Extremities: no mottling, pulses intact, no edema  Neurologic: awake, alert, no focal deficits  Psychiatric: normal affect, cooperative    Assessment / Plan  Acute hypoxic respiratory failure due to COVID pneumonia - 86% on room air in triage. Does not meet sepsis criteria. RFA positive for COVID in ED and CXR showed bilateral airspace opacities.   SpO2 as needed, wean as able, start Decadron / vitamin C / zinc.  Consult to pharmacy to dose remdesivir; CMP showed AST 64 initially, otherwise unremarkable. BMI >30 and CrCl 51; Lovenox 40 mg SQ bid for DVT ppx w plans for Eliquis 2.5 mg PO bid x30 days on dc.   Placed on BiPAP evening of 11/27 and pulm consulted, input reviewed and appreciated.     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

## 2020-11-29 NOTE — PROGRESS NOTES
[facility] [address] [facility]  [address], [address]  [place], 17 [facility]      Pulmonary Progress Note      SUBJECTIVE:  On 14 li HFNC when seen, not much cough/congestion and slightly better with respiratory function, used BIPAP last night and some complaints with dry mouth.      OBJECTIVE    Medications    Continuous Infusions:    Scheduled Meds:   albuterol sulfate HFA  2 puff Inhalation TID    sodium chloride flush  10 mL Intravenous 2 times per day    enoxaparin  40 mg Subcutaneous BID    vitamin C  1,000 mg Oral Daily    zinc sulfate  50 mg Oral BID    dexamethasone  6 mg Oral Daily    valsartan  320 mg Oral Daily    And    hydroCHLOROthiazide  12.5 mg Oral Daily    remdesivir IVPB  100 mg Intravenous Q24H       PRN Meds:acetaminophen **OR** acetaminophen, sodium chloride flush, acetaminophen **OR** acetaminophen, polyethylene glycol, promethazine **OR** ondansetron, sodium chloride    Physical    VITALS:  /71   Pulse 61   Temp 97.7 °F (36.5 °C) (Infrared)   Resp 20   Ht 5' 10.5\" (1.791 m)   Wt 245 lb (111.1 kg)   SpO2 93%   BMI 34.66 kg/m²     24HR INTAKE/OUTPUT:      Intake/Output Summary (Last 24 hours) at 2020 1049  Last data filed at 2020  Gross per 24 hour   Intake --   Output 200 ml   Net -200 ml       24HR PULSE OXIMETRY RANGE:    SpO2  Av.3 %  Min: 92 %  Max: 93 %    General appearance: alert, appears stated age and cooperative  Lungs: rhonchi bilaterally with cough  Heart: regular rate and rhythm, S1, S2 normal, no murmur, click, rub or gallop  Abdomen: soft, non-tender; bowel sounds normal; no masses,  no organomegaly  Extremities: extremities normal, atraumatic, no cyanosis or edema  Neurologic: Mental status: Alert, oriented, thought content appropriate    Data    CBC:   Recent Labs     20  0719 20  0508 20  0400   WBC 5.5 5.4 8.6   HGB 14.3 14.8 14.2   HCT 43.3 45.7 41.6   MCV 90.6 93.3 90.0   * 149 190       BMP:  Recent Labs     11/27/20  0719 11/28/20  0508 11/29/20  0400   * 133 133   K 4.0 4.1 4.2   CL 93* 99 99   CO2 23 19* 23   BUN 15 16 22   CREATININE 1.3* 1.1 1.1    ALB:3,BILIDIR:3,BILITOT:3,ALKPHOS:3)@    PT/INR:   Recent Labs     11/27/20  1603   PROTIME 10.8   INR 0.9       ABG:   No results for input(s): PH, PO2, PCO2, HCO3, BE, O2SAT, METHB, O2HB, COHB, O2CON, HHB, THB in the last 72 hours. FiO2 : 70 %       Radiology/Other tests reviewed: none    Assessment:     Active Problems:    Pneumonia due to COVID-19 virus  Resolved Problems:    * No resolved hospital problems. *  hypoxia  DILLAN    Plan:       1. Cont with remdesivir and decadron  2. Cont with albuterol mdi  3. Cont with oxygen daytime, NIPPV at night, will see if can use his own CPAP or not, can't humidify hospital NIPPV unit  4. OOB to chair, ambulate with assistance      Time at the bedside, reviewing labs and radiographs, reviewing notes and consultations, discussing with staff and family was more than 35 minutes. Thanks for letting us see this patient in consultation. Please contact us with any questions. Office [phone] or after hours through [place], x [phone].

## 2020-11-29 NOTE — PROGRESS NOTES
Date: 11/28/2020    Time: 10:27 PM    Patient Placed On BIPAP/CPAP/ Non-Invasive Ventilation? Yes    If no must comment. Facial area red/color change? No           If YES are Blister/Lesion present? No   If yes must notify nursing staff  BIPAP/CPAP skin barrier?   Yes    Skin barrier type:mepilexlite       Comments:     11/28/20 0642   NIV Type   NIV Started/Stopped On   Equipment Type V60   Mode Bilevel   Mask Type Full face mask   Mask Size Medium   Settings/Measurements   IPAP 12 cmH20   CPAP/EPAP 6 cmH2O   Rate Ordered 12   Resp 28   FiO2  70 %   Vt Exhaled 650 mL   Minute Volume 20.1 Liters   Mask Leak (lpm) 27 lpm   Comfort Level Good   Using Accessory Muscles No   SpO2 99         Amy Jordan

## 2020-11-30 LAB
ALBUMIN SERPL-MCNC: 2.8 G/DL (ref 3.5–5.2)
ALP BLD-CCNC: 53 U/L (ref 40–129)
ALT SERPL-CCNC: 33 U/L (ref 0–40)
ANION GAP SERPL CALCULATED.3IONS-SCNC: 8 MMOL/L (ref 7–16)
AST SERPL-CCNC: 36 U/L (ref 0–39)
BILIRUB SERPL-MCNC: 0.3 MG/DL (ref 0–1.2)
BUN BLDV-MCNC: 22 MG/DL (ref 8–23)
CALCIUM SERPL-MCNC: 8.3 MG/DL (ref 8.6–10.2)
CHLORIDE BLD-SCNC: 95 MMOL/L (ref 98–107)
CO2: 27 MMOL/L (ref 22–29)
CREAT SERPL-MCNC: 1.1 MG/DL (ref 0.7–1.2)
GFR AFRICAN AMERICAN: >60
GFR NON-AFRICAN AMERICAN: >60 ML/MIN/1.73
GLUCOSE BLD-MCNC: 288 MG/DL (ref 74–99)
HCT VFR BLD CALC: 41.9 % (ref 37–54)
HEMOGLOBIN: 13.7 G/DL (ref 12.5–16.5)
MCH RBC QN AUTO: 29.8 PG (ref 26–35)
MCHC RBC AUTO-ENTMCNC: 32.7 % (ref 32–34.5)
MCV RBC AUTO: 91.3 FL (ref 80–99.9)
PDW BLD-RTO: 12.5 FL (ref 11.5–15)
PLATELET # BLD: 193 E9/L (ref 130–450)
PMV BLD AUTO: 10.7 FL (ref 7–12)
POTASSIUM SERPL-SCNC: 4.1 MMOL/L (ref 3.5–5)
RBC # BLD: 4.59 E12/L (ref 3.8–5.8)
SODIUM BLD-SCNC: 130 MMOL/L (ref 132–146)
TOTAL PROTEIN: 5.6 G/DL (ref 6.4–8.3)
WBC # BLD: 6.9 E9/L (ref 4.5–11.5)

## 2020-11-30 PROCEDURE — 2500000003 HC RX 250 WO HCPCS: Performed by: INTERNAL MEDICINE

## 2020-11-30 PROCEDURE — 2580000003 HC RX 258: Performed by: INTERNAL MEDICINE

## 2020-11-30 PROCEDURE — 6360000002 HC RX W HCPCS: Performed by: INTERNAL MEDICINE

## 2020-11-30 PROCEDURE — 6370000000 HC RX 637 (ALT 250 FOR IP): Performed by: INTERNAL MEDICINE

## 2020-11-30 PROCEDURE — 36415 COLL VENOUS BLD VENIPUNCTURE: CPT

## 2020-11-30 PROCEDURE — 94660 CPAP INITIATION&MGMT: CPT

## 2020-11-30 PROCEDURE — 2700000000 HC OXYGEN THERAPY PER DAY

## 2020-11-30 PROCEDURE — 80053 COMPREHEN METABOLIC PANEL: CPT

## 2020-11-30 PROCEDURE — 99233 SBSQ HOSP IP/OBS HIGH 50: CPT | Performed by: INTERNAL MEDICINE

## 2020-11-30 PROCEDURE — 2060000000 HC ICU INTERMEDIATE R&B

## 2020-11-30 PROCEDURE — 85027 COMPLETE CBC AUTOMATED: CPT

## 2020-11-30 PROCEDURE — 6370000000 HC RX 637 (ALT 250 FOR IP)

## 2020-11-30 RX ORDER — ASCORBIC ACID 500 MG
TABLET ORAL
Status: COMPLETED
Start: 2020-11-30 | End: 2020-11-30

## 2020-11-30 RX ADMIN — Medication 500 MG: at 09:49

## 2020-11-30 RX ADMIN — Medication 10 ML: at 20:10

## 2020-11-30 RX ADMIN — Medication 10 ML: at 09:53

## 2020-11-30 RX ADMIN — VALSARTAN 320 MG: 320 TABLET ORAL at 09:47

## 2020-11-30 RX ADMIN — ZINC SULFATE 220 MG (50 MG) CAPSULE 50 MG: CAPSULE at 20:07

## 2020-11-30 RX ADMIN — ALBUTEROL SULFATE 2 PUFF: 90 AEROSOL, METERED RESPIRATORY (INHALATION) at 09:54

## 2020-11-30 RX ADMIN — ENOXAPARIN SODIUM 40 MG: 40 INJECTION SUBCUTANEOUS at 20:08

## 2020-11-30 RX ADMIN — DEXAMETHASONE 6 MG: 4 TABLET ORAL at 09:47

## 2020-11-30 RX ADMIN — ZINC SULFATE 220 MG (50 MG) CAPSULE 50 MG: CAPSULE at 09:47

## 2020-11-30 RX ADMIN — ALBUTEROL SULFATE 2 PUFF: 90 AEROSOL, METERED RESPIRATORY (INHALATION) at 20:08

## 2020-11-30 RX ADMIN — SALINE NASAL SPRAY 1 SPRAY: 1.5 SOLUTION NASAL at 20:07

## 2020-11-30 RX ADMIN — HYDROCHLOROTHIAZIDE 12.5 MG: 12.5 TABLET ORAL at 09:46

## 2020-11-30 RX ADMIN — ENOXAPARIN SODIUM 40 MG: 40 INJECTION SUBCUTANEOUS at 09:46

## 2020-11-30 RX ADMIN — REMDESIVIR 100 MG: 100 INJECTION, POWDER, LYOPHILIZED, FOR SOLUTION INTRAVENOUS at 20:08

## 2020-11-30 ASSESSMENT — PAIN SCALES - GENERAL: PAINLEVEL_OUTOF10: 0

## 2020-11-30 NOTE — PROGRESS NOTES
[facility] and [facility]  [address] [facility], [address], 17 [facility]      Pulmonary Progress Note      SUBJECTIVE:  Still on 14 li HFNC when seen, mentions some nasal congestion and moderate cough and sputum production, used BIPAP last night.      OBJECTIVE    Medications    Continuous Infusions:    Scheduled Meds:   vitamin C        albuterol sulfate HFA  2 puff Inhalation TID    sodium chloride flush  10 mL Intravenous 2 times per day    enoxaparin  40 mg Subcutaneous BID    vitamin C  1,000 mg Oral Daily    zinc sulfate  50 mg Oral BID    dexamethasone  6 mg Oral Daily    valsartan  320 mg Oral Daily    And    hydroCHLOROthiazide  12.5 mg Oral Daily    remdesivir IVPB  100 mg Intravenous Q24H       PRN Meds:acetaminophen **OR** acetaminophen, sodium chloride flush, acetaminophen **OR** acetaminophen, polyethylene glycol, promethazine **OR** ondansetron, sodium chloride    Physical    VITALS:  /71   Pulse 66   Temp 97.8 °F (36.6 °C) (Oral)   Resp 28   Ht 5' 10.5\" (1.791 m)   Wt 245 lb (111.1 kg)   SpO2 95%   BMI 34.66 kg/m²     24HR INTAKE/OUTPUT:    No intake or output data in the 24 hours ending 20 0922    24HR PULSE OXIMETRY RANGE:    SpO2  Av.7 %  Min: 90 %  Max: 95 %    General appearance: alert, appears stated age and cooperative  Lungs: rhonchi bilaterally with cough  Heart: regular rate and rhythm, S1, S2 normal, no murmur, click, rub or gallop  Abdomen: soft, non-tender; bowel sounds normal; no masses,  no organomegaly  Extremities: extremities normal, atraumatic, no cyanosis or edema  Neurologic: Mental status: Alert, oriented, thought content appropriate    Data    CBC:   Recent Labs     20  0508 20  0400 20  0545   WBC 5.4 8.6 6.9   HGB 14.8 14.2 13.7   HCT 45.7 41.6 41.9   MCV 93.3 90.0 91.3    190 193       BMP:  Recent Labs     20  0508 20  0400 20  0545    133 130*   K 4.1 4.2 4.1   CL 99 99 95*   CO2 19* 23 27   BUN 16 22 22   CREATININE 1.1 1.1 1.1    ALB:3,BILIDIR:3,BILITOT:3,ALKPHOS:3)@    PT/INR:   Recent Labs     11/27/20  1603   PROTIME 10.8   INR 0.9       ABG:   No results for input(s): PH, PO2, PCO2, HCO3, BE, O2SAT, METHB, O2HB, COHB, O2CON, HHB, THB in the last 72 hours. FiO2 : 70 %       Radiology/Other tests reviewed: none    Assessment:     Active Problems:    Pneumonia due to COVID-19 virus  Resolved Problems:    * No resolved hospital problems. *  hypoxia  DILLAN    Plan:       1. Cont with remdesivir and decadron  2. CXR tomorrow  3. Cont with albuterol mdi  4. Cont with oxygen daytime, NIPPV at night which he appears to be tolerating so far  5. Nasal saline bid and flutter device, see if helps with cough and sputum production  6. OOB to chair, ambulate with assistance      Time at the bedside, reviewing labs and radiographs, reviewing notes and consultations, discussing with staff and family was more than 35 minutes. Thanks for letting us see this patient in consultation. Please contact us with any questions. Office [phone] or after hours through [employer], x [phone].

## 2020-11-30 NOTE — CARE COORDINATION
Social work / Discharge Planning:       [place] [address]. Social work spoke to the patient via phone for initial assessment. He lives with his wife and uses no DME. He states he was independent prior to admission. Patient has home 02 at HS only (2.5 liters) and a cpap from [facility]. Patient has no history of HC or JESSICA. He is currently on 16 liters of oxygen. Social work will follow and assist with any discharge needs.   Electronically signed by ANA Foster on 11/30/2020 at 2:00 PM

## 2020-11-30 NOTE — PROGRESS NOTES
Comprehensive Nutrition Assessment    Type and Reason for Visit:  Initial, Consult(Poor Intake)    Nutrition Recommendations/Plan: Continue Diet. Will Continue Ensure High Matheus ONS BID. Nutrition Assessment:  Pt adm w/ SOB x ~1.5 weeks 2/2 COVID19+ per EMR. Pt at risk d/t poor intake and subjective ~10# wt loss x ~1.5 wks d/t poor appetite 2/2 COVID19+. Will Continue Current ONS to aid in PO intake. Malnutrition Assessment:  Malnutrition Status:  Insufficient data    Context:  Acute Illness     Findings of the 6 clinical characteristics of malnutrition:  Energy Intake:  1 - 75% or less of estimated energy requirements for 7 or more days(per pt, pta)  Weight Loss:  Unable to assess(2/2 poor EMR wt hx both since adm as well as pta)     Body Fat Loss:  Unable to assess     Muscle Mass Loss:  Unable to assess    Fluid Accumulation:  Unable to assess(2/2 CKD w/ suspected fluid overload)     Strength:  Not Performed    Estimated Daily Nutrient Needs:  Energy (kcal):  3781-9935(MSJ x 1.2 SF per CBW); Weight Used for Energy Requirements:  Current     Protein (g):  (1.2-1.4 gm/kg per IBW); Weight Used for Protein Requirements:  Ideal        Fluid (ml/day):  3916-3689; Method Used for Fluid Requirements:  1 ml/kcal      Nutrition Related Findings:  A&O, poor dentition, Abd/BS WDL, +2 BLE edema, I/O's WNL      Wounds:  None       Current Nutrition Therapies:    DIET GENERAL;  Dietary Nutrition Supplements: Standard High Calorie Oral Supplement    Anthropometric Measures:  · Height: 5' 10.5\" (179.1 cm)  · Current Body Weight: 245 lb (111.1 kg)(stated 11/27)   · Admission Body Weight: 245 lb (111.1 kg)(stated 11/27)    · Usual Body Weight: (UTO UBW 2/2 poor EMR wt hx w/ no actual wt's pta)     · Ideal Body Weight: 169 lbs; % Ideal Body Weight 145 %   · BMI: 34.6  · Adjusted Body Weight:  ; No Adjustment   · Adjusted BMI:      · BMI Categories: Obese Class 1 (BMI 30.0-34. 9)       Nutrition Diagnosis:

## 2020-11-30 NOTE — PLAN OF CARE
Problem: Inadequate oral food/beverage intake (NI-2.1)  Goal: Food and/or Nutrient Delivery  Continue Diet. Will Continue Ensure High Matheus ONS BID. Description: Individualized approach for food/nutrient provision.   Outcome: Met This Shift

## 2020-11-30 NOTE — PROGRESS NOTES
[facility]) Hospitalist Progress Note    Admission Date         11/27/2020  6:21 AM  Chief Complaint        Shortness of breath  Admit Dx                    Pneumonia due to COVID-19 virus [U07.1, J12.89]     Subjective  History of Present Illness  Pt is an 86M w PMH HTN, CKD III, questionable COPD (wears 2.5-3LNC nightly) who presented to ED 11/27 COVID-positive with SOB worsening over the last ~10 days. Admits to fevers, denies fevers / chills / chest pain / nausea / diarrhea. Required 5LNC in ED though needed to be escalated to BiPAP night of admission. Interim History  Pt seen at bedside, lying on his side, resting comfortably. On 15L HFNC still today. Tolerated BiPAP overnight ok. No new issues today, still productive cough, and pt really not making much forward progress. Review of Systems - 12-point review of systems has been reviewed and is otherwise negative except as listed in the HPI    Objective  Physical Exam  Vitals: BP (!) 115/50   Pulse 65   Temp 97.8 °F (36.6 °C) (Oral)   Resp 24   Ht 5' 10.5\" (1.791 m)   Wt 245 lb (111.1 kg)   SpO2 92%   BMI 34.66 kg/m²   General: well-developed, well-nourished, no acute distress, cooperative  Skin: warm, dry, intact, normal color without cyanosis  HEENT: normocephalic, atraumatic, mucous membranes normal; HFNC in place  Respiratory: clear to auscultation bilaterally without respiratory distress  Cardiovascular: regular rate and rhythm without murmur / rub / gallop  Abdominal: soft, nontender, nondistended, normoactive bowel sounds  Extremities: no mottling, pulses intact, no edema  Neurologic: awake, alert, no focal deficits  Psychiatric: normal affect, cooperative    Assessment / Plan  Acute hypoxic respiratory failure due to COVID pneumonia - 86% on room air in triage. Does not meet sepsis criteria. RFA positive for COVID in ED and CXR showed bilateral airspace opacities.   SpO2 as needed, wean as able, start Decadron / vitamin C / zinc.  Consult to pharmacy to dose remdesivir; CMP showed AST 64 initially, otherwise unremarkable. BMI >30 and CrCl 51; Lovenox 40 mg SQ bid for DVT ppx w plans for Eliquis 2.5 mg PO bid x30 days on dc.   Placed on BiPAP evening of 11/27 and pulm consulted, input reviewed and appreciated.     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

## 2020-11-30 NOTE — PROGRESS NOTES
Date: 11/29/2020    Time: 9:56 PM    Patient Placed On BIPAP/CPAP/ Non-Invasive Ventilation? Yes    If no must comment. Facial area red/color change? No           If YES are Blister/Lesion present? No   If yes must notify nursing staff  BIPAP/CPAP skin barrier?   Yes    Skin barrier type:mepilexlite       Comments:       11/29/20 1580   NIV Type   NIV Started/Stopped On   Equipment Type V60   Mode Bilevel   Mask Type Full face mask   Mask Size Medium   Settings/Measurements   IPAP 12 cmH20   CPAP/EPAP 6 cmH2O   Rate Ordered 12   Resp 20   FiO2  70 %   Vt Exhaled 716 mL   Minute Volume 19.9 Liters   Mask Leak (lpm) 31 lpm   Comfort Level Good   Using Accessory Muscles No   SpO2 95       Amy Jordan

## 2020-12-01 ENCOUNTER — APPOINTMENT (OUTPATIENT)
Dept: GENERAL RADIOLOGY | Age: 85
DRG: 177 | End: 2020-12-01
Payer: MEDICARE

## 2020-12-01 LAB
ALBUMIN SERPL-MCNC: 2.7 G/DL (ref 3.5–5.2)
ALP BLD-CCNC: 60 U/L (ref 40–129)
ALT SERPL-CCNC: 38 U/L (ref 0–40)
ANION GAP SERPL CALCULATED.3IONS-SCNC: 11 MMOL/L (ref 7–16)
AST SERPL-CCNC: 36 U/L (ref 0–39)
BILIRUB SERPL-MCNC: 0.3 MG/DL (ref 0–1.2)
BUN BLDV-MCNC: 23 MG/DL (ref 8–23)
CALCIUM SERPL-MCNC: 8.2 MG/DL (ref 8.6–10.2)
CHLORIDE BLD-SCNC: 96 MMOL/L (ref 98–107)
CO2: 26 MMOL/L (ref 22–29)
CREAT SERPL-MCNC: 1 MG/DL (ref 0.7–1.2)
GFR AFRICAN AMERICAN: >60
GFR NON-AFRICAN AMERICAN: >60 ML/MIN/1.73
GLUCOSE BLD-MCNC: 332 MG/DL (ref 74–99)
HBA1C MFR BLD: 7.4 % (ref 4–5.6)
HCT VFR BLD CALC: 40.4 % (ref 37–54)
HEMOGLOBIN: 13.7 G/DL (ref 12.5–16.5)
MCH RBC QN AUTO: 30.5 PG (ref 26–35)
MCHC RBC AUTO-ENTMCNC: 33.9 % (ref 32–34.5)
MCV RBC AUTO: 90 FL (ref 80–99.9)
METER GLUCOSE: 215 MG/DL (ref 74–99)
METER GLUCOSE: 334 MG/DL (ref 74–99)
METER GLUCOSE: 363 MG/DL (ref 74–99)
PDW BLD-RTO: 12.3 FL (ref 11.5–15)
PLATELET # BLD: 185 E9/L (ref 130–450)
PMV BLD AUTO: 10.5 FL (ref 7–12)
POTASSIUM SERPL-SCNC: 4.2 MMOL/L (ref 3.5–5)
RBC # BLD: 4.49 E12/L (ref 3.8–5.8)
SODIUM BLD-SCNC: 133 MMOL/L (ref 132–146)
TOTAL PROTEIN: 5.7 G/DL (ref 6.4–8.3)
WBC # BLD: 6.6 E9/L (ref 4.5–11.5)

## 2020-12-01 PROCEDURE — 82962 GLUCOSE BLOOD TEST: CPT

## 2020-12-01 PROCEDURE — 2700000000 HC OXYGEN THERAPY PER DAY

## 2020-12-01 PROCEDURE — 2500000003 HC RX 250 WO HCPCS: Performed by: INTERNAL MEDICINE

## 2020-12-01 PROCEDURE — 99232 SBSQ HOSP IP/OBS MODERATE 35: CPT | Performed by: INTERNAL MEDICINE

## 2020-12-01 PROCEDURE — 6370000000 HC RX 637 (ALT 250 FOR IP): Performed by: INTERNAL MEDICINE

## 2020-12-01 PROCEDURE — 83036 HEMOGLOBIN GLYCOSYLATED A1C: CPT

## 2020-12-01 PROCEDURE — 71045 X-RAY EXAM CHEST 1 VIEW: CPT

## 2020-12-01 PROCEDURE — 94660 CPAP INITIATION&MGMT: CPT

## 2020-12-01 PROCEDURE — 80053 COMPREHEN METABOLIC PANEL: CPT

## 2020-12-01 PROCEDURE — 6360000002 HC RX W HCPCS: Performed by: INTERNAL MEDICINE

## 2020-12-01 PROCEDURE — 85027 COMPLETE CBC AUTOMATED: CPT

## 2020-12-01 PROCEDURE — 2580000003 HC RX 258: Performed by: INTERNAL MEDICINE

## 2020-12-01 PROCEDURE — 2060000000 HC ICU INTERMEDIATE R&B

## 2020-12-01 PROCEDURE — 36415 COLL VENOUS BLD VENIPUNCTURE: CPT

## 2020-12-01 RX ORDER — DEXTROSE MONOHYDRATE 25 G/50ML
12.5 INJECTION, SOLUTION INTRAVENOUS PRN
Status: DISCONTINUED | OUTPATIENT
Start: 2020-12-01 | End: 2020-12-10 | Stop reason: HOSPADM

## 2020-12-01 RX ORDER — INSULIN GLARGINE 100 [IU]/ML
0.25 INJECTION, SOLUTION SUBCUTANEOUS NIGHTLY
Status: DISCONTINUED | OUTPATIENT
Start: 2020-12-01 | End: 2020-12-10 | Stop reason: HOSPADM

## 2020-12-01 RX ORDER — DEXAMETHASONE 4 MG/1
6 TABLET ORAL DAILY
Status: DISCONTINUED | OUTPATIENT
Start: 2020-12-01 | End: 2020-12-10 | Stop reason: HOSPADM

## 2020-12-01 RX ORDER — NICOTINE POLACRILEX 4 MG
15 LOZENGE BUCCAL PRN
Status: DISCONTINUED | OUTPATIENT
Start: 2020-12-01 | End: 2020-12-10 | Stop reason: HOSPADM

## 2020-12-01 RX ORDER — DEXTROSE MONOHYDRATE 50 MG/ML
100 INJECTION, SOLUTION INTRAVENOUS PRN
Status: DISCONTINUED | OUTPATIENT
Start: 2020-12-01 | End: 2020-12-04

## 2020-12-01 RX ADMIN — ALBUTEROL SULFATE 2 PUFF: 90 AEROSOL, METERED RESPIRATORY (INHALATION) at 14:00

## 2020-12-01 RX ADMIN — INSULIN GLARGINE 19 UNITS: 100 INJECTION, SOLUTION SUBCUTANEOUS at 22:50

## 2020-12-01 RX ADMIN — INSULIN LISPRO 6 UNITS: 100 INJECTION, SOLUTION INTRAVENOUS; SUBCUTANEOUS at 12:00

## 2020-12-01 RX ADMIN — DEXAMETHASONE 6 MG: 4 TABLET ORAL at 11:29

## 2020-12-01 RX ADMIN — ALBUTEROL SULFATE 2 PUFF: 90 AEROSOL, METERED RESPIRATORY (INHALATION) at 21:16

## 2020-12-01 RX ADMIN — ZINC SULFATE 220 MG (50 MG) CAPSULE 50 MG: CAPSULE at 08:41

## 2020-12-01 RX ADMIN — SALINE NASAL SPRAY 1 SPRAY: 1.5 SOLUTION NASAL at 08:43

## 2020-12-01 RX ADMIN — VALSARTAN 320 MG: 320 TABLET ORAL at 08:40

## 2020-12-01 RX ADMIN — INSULIN LISPRO 6 UNITS: 100 INJECTION, SOLUTION INTRAVENOUS; SUBCUTANEOUS at 17:42

## 2020-12-01 RX ADMIN — ALBUTEROL SULFATE 2 PUFF: 90 AEROSOL, METERED RESPIRATORY (INHALATION) at 08:40

## 2020-12-01 RX ADMIN — Medication 10 ML: at 08:43

## 2020-12-01 RX ADMIN — ENOXAPARIN SODIUM 40 MG: 40 INJECTION SUBCUTANEOUS at 22:50

## 2020-12-01 RX ADMIN — HYDROCHLOROTHIAZIDE 12.5 MG: 12.5 TABLET ORAL at 08:41

## 2020-12-01 RX ADMIN — Medication 1000 MG: at 08:42

## 2020-12-01 RX ADMIN — INSULIN LISPRO 3 UNITS: 100 INJECTION, SOLUTION INTRAVENOUS; SUBCUTANEOUS at 22:51

## 2020-12-01 RX ADMIN — Medication 10 ML: at 22:57

## 2020-12-01 RX ADMIN — ZINC SULFATE 220 MG (50 MG) CAPSULE 50 MG: CAPSULE at 22:57

## 2020-12-01 RX ADMIN — INSULIN LISPRO 4 UNITS: 100 INJECTION, SOLUTION INTRAVENOUS; SUBCUTANEOUS at 17:41

## 2020-12-01 RX ADMIN — INSULIN LISPRO 8 UNITS: 100 INJECTION, SOLUTION INTRAVENOUS; SUBCUTANEOUS at 12:00

## 2020-12-01 RX ADMIN — REMDESIVIR 100 MG: 100 INJECTION, POWDER, LYOPHILIZED, FOR SOLUTION INTRAVENOUS at 22:52

## 2020-12-01 RX ADMIN — ENOXAPARIN SODIUM 40 MG: 40 INJECTION SUBCUTANEOUS at 08:43

## 2020-12-01 RX ADMIN — SALINE NASAL SPRAY 1 SPRAY: 1.5 SOLUTION NASAL at 16:17

## 2020-12-01 ASSESSMENT — PAIN SCALES - GENERAL
PAINLEVEL_OUTOF10: 0

## 2020-12-01 NOTE — PROGRESS NOTES
Date: 11/30/2020    Time: 11:09 PM    Patient Placed On BIPAP/CPAP/ Non-Invasive Ventilation? Yes    If no must comment. Facial area red/color change? No           If YES are Blister/Lesion present? No   If yes must notify nursing staff  BIPAP/CPAP skin barrier?   Yes    Skin barrier type:mepilexlite       Comments:        Billie Hu

## 2020-12-01 NOTE — PROGRESS NOTES
[facility]) Hospitalist Progress Note    Admission Date         11/27/2020  6:21 AM  Chief Complaint        Shortness of breath  Admit Dx                    Pneumonia due to COVID-19 virus [U07.1, J12.89]     Subjective  History of Present Illness  Pt is an 86M w PMH HTN, CKD III, questionable COPD (wears 2.5-3LNC nightly) who presented to ED 11/27 COVID-positive with SOB worsening over the last ~10 days. Admits to fevers, denies fevers / chills / chest pain / nausea / diarrhea. Required 5LNC in ED though needed to be escalated to BiPAP night of admission. Interim History  Pt seen through window, now with OptiFlow on. Pulmonary continues to follow. No new issues identified today. Pt was rounded on remotely today. Review of Systems - 12-point review of systems has been reviewed and is otherwise negative except as listed in the HPI    Objective  Physical Exam  Vitals: BP (!) 156/83   Pulse 64   Temp 98.3 °F (36.8 °C) (Oral)   Resp 21   Ht 5' 10.5\" (1.791 m)   Wt 245 lb (111.1 kg)   SpO2 90%   BMI 34.66 kg/m²   General: well-developed, well-nourished, no acute distress, cooperative  Skin: warm, dry, intact, normal color without cyanosis  HEENT: normocephalic, atraumatic, mucous membranes normal; HFNC in place  Respiratory: clear to auscultation bilaterally without respiratory distress  Cardiovascular: regular rate and rhythm without murmur / rub / gallop  Abdominal: soft, nontender, nondistended, normoactive bowel sounds  Extremities: no mottling, pulses intact, no edema  Neurologic: awake, alert, no focal deficits  Psychiatric: normal affect, cooperative    Assessment / Plan  Acute hypoxic respiratory failure due to COVID pneumonia - 86% on room air in triage. Does not meet sepsis criteria. RFA positive for COVID in ED and CXR showed bilateral airspace opacities.   SpO2 as needed (currently on OptiFlow), wean as able, start Decadron / vitamin C / zinc.  Consult to pharmacy to dose

## 2020-12-01 NOTE — PROGRESS NOTES
[facility] and [address] [facility]  [address] [facility], [address]  [facility], 17 [facility]      Pulmonary Progress Note      SUBJECTIVE:  On optiflow 60 li 84% since yesterday afternoon, uses BIPAP at night and tolerates, looking similar with respiratory function.      OBJECTIVE    Medications    Continuous Infusions:   dextrose         Scheduled Meds:   insulin glargine  0.25 Units/kg (Ideal) Subcutaneous Nightly    insulin lispro  0.08 Units/kg (Ideal) Subcutaneous TID WC    insulin lispro  0-12 Units Subcutaneous TID WC    insulin lispro  0-6 Units Subcutaneous Nightly    dexamethasone  6 mg Oral Daily    sodium chloride  1 spray Each Nostril BID    albuterol sulfate HFA  2 puff Inhalation TID    sodium chloride flush  10 mL Intravenous 2 times per day    enoxaparin  40 mg Subcutaneous BID    vitamin C  1,000 mg Oral Daily    zinc sulfate  50 mg Oral BID    valsartan  320 mg Oral Daily    And    hydroCHLOROthiazide  12.5 mg Oral Daily    remdesivir IVPB  100 mg Intravenous Q24H       PRN Meds:glucose, dextrose, glucagon (rDNA), dextrose, acetaminophen **OR** acetaminophen, sodium chloride flush, acetaminophen **OR** acetaminophen, polyethylene glycol, promethazine **OR** ondansetron, sodium chloride    Physical    VITALS:  BP (!) 156/83   Pulse 64   Temp 98.3 °F (36.8 °C) (Oral)   Resp 21   Ht 5' 10.5\" (1.791 m)   Wt 245 lb (111.1 kg)   SpO2 90%   BMI 34.66 kg/m²     24HR INTAKE/OUTPUT:      Intake/Output Summary (Last 24 hours) at 2020 1322  Last data filed at 2020 0843  Gross per 24 hour   Intake 10 ml   Output 325 ml   Net -315 ml       24HR PULSE OXIMETRY RANGE:    SpO2  Av %  Min: 89 %  Max: 98 %    General appearance: alert, appears stated age and cooperative  Lungs: rhonchi bilaterally with cough  Heart: regular rate and rhythm, S1, S2 normal, no murmur, click, rub or gallop  Abdomen: soft, non-tender; bowel sounds normal; no masses, no organomegaly  Extremities: extremities normal, atraumatic, no cyanosis or edema  Neurologic: Mental status: Alert, oriented, thought content appropriate    Data    CBC:   Recent Labs     11/29/20  0400 11/30/20  0545 12/01/20  0330   WBC 8.6 6.9 6.6   HGB 14.2 13.7 13.7   HCT 41.6 41.9 40.4   MCV 90.0 91.3 90.0    193 185       BMP:  Recent Labs     11/29/20  0400 11/30/20  0545 12/01/20  0330    130* 133   K 4.2 4.1 4.2   CL 99 95* 96*   CO2 23 27 26   BUN 22 22 23   CREATININE 1.1 1.1 1.0    ALB:3,BILIDIR:3,BILITOT:3,ALKPHOS:3)@    PT/INR:   No results for input(s): PROTIME, INR in the last 72 hours. ABG:   No results for input(s): PH, PO2, PCO2, HCO3, BE, O2SAT, METHB, O2HB, COHB, O2CON, HHB, THB in the last 72 hours. FiO2 : (S) 70 %       Radiology/Other tests reviewed: none    Assessment:     Active Problems:    Pneumonia due to COVID-19 virus  Resolved Problems:    * No resolved hospital problems. *  hypoxia  DILLAN    Plan:       1. Cont with remdesivir (?) finishing up tomorrow and decadron  2. CXR today  3. Cont with albuterol mdi  4. Cont with oxygen daytime and taper as tolerated, NIPPV at night which he appears to be tolerating so far  5. Nasal saline bid and flutter device, see if helps with cough and sputum production  6. OOB to chair, ambulate with assistance      Time at the bedside, reviewing labs and radiographs, reviewing notes and consultations, discussing with staff and family was more than 35 minutes. Thanks for letting us see this patient in consultation. Please contact us with any questions. Office [phone] or after hours through [facility], x [phone].

## 2020-12-02 LAB
ALBUMIN SERPL-MCNC: 2.6 G/DL (ref 3.5–5.2)
ALP BLD-CCNC: 57 U/L (ref 40–129)
ALT SERPL-CCNC: 38 U/L (ref 0–40)
ANION GAP SERPL CALCULATED.3IONS-SCNC: 10 MMOL/L (ref 7–16)
AST SERPL-CCNC: 31 U/L (ref 0–39)
BILIRUB SERPL-MCNC: 0.4 MG/DL (ref 0–1.2)
BLOOD CULTURE, ROUTINE: NORMAL
BUN BLDV-MCNC: 22 MG/DL (ref 8–23)
CALCIUM SERPL-MCNC: 8.4 MG/DL (ref 8.6–10.2)
CHLORIDE BLD-SCNC: 96 MMOL/L (ref 98–107)
CO2: 26 MMOL/L (ref 22–29)
CREAT SERPL-MCNC: 1 MG/DL (ref 0.7–1.2)
CULTURE, BLOOD 2: NORMAL
GFR AFRICAN AMERICAN: >60
GFR NON-AFRICAN AMERICAN: >60 ML/MIN/1.73
GLUCOSE BLD-MCNC: 282 MG/DL (ref 74–99)
HCT VFR BLD CALC: 43.3 % (ref 37–54)
HEMOGLOBIN: 14 G/DL (ref 12.5–16.5)
MCH RBC QN AUTO: 29.7 PG (ref 26–35)
MCHC RBC AUTO-ENTMCNC: 32.3 % (ref 32–34.5)
MCV RBC AUTO: 91.7 FL (ref 80–99.9)
METER GLUCOSE: 241 MG/DL (ref 74–99)
METER GLUCOSE: 249 MG/DL (ref 74–99)
METER GLUCOSE: 310 MG/DL (ref 74–99)
METER GLUCOSE: 376 MG/DL (ref 74–99)
PDW BLD-RTO: 12.2 FL (ref 11.5–15)
PLATELET # BLD: 197 E9/L (ref 130–450)
PMV BLD AUTO: 10.6 FL (ref 7–12)
POTASSIUM SERPL-SCNC: 4.2 MMOL/L (ref 3.5–5)
RBC # BLD: 4.72 E12/L (ref 3.8–5.8)
SODIUM BLD-SCNC: 132 MMOL/L (ref 132–146)
TOTAL PROTEIN: 5.6 G/DL (ref 6.4–8.3)
WBC # BLD: 7.2 E9/L (ref 4.5–11.5)

## 2020-12-02 PROCEDURE — 6370000000 HC RX 637 (ALT 250 FOR IP): Performed by: INTERNAL MEDICINE

## 2020-12-02 PROCEDURE — 2060000000 HC ICU INTERMEDIATE R&B

## 2020-12-02 PROCEDURE — 94660 CPAP INITIATION&MGMT: CPT

## 2020-12-02 PROCEDURE — 85027 COMPLETE CBC AUTOMATED: CPT

## 2020-12-02 PROCEDURE — 99232 SBSQ HOSP IP/OBS MODERATE 35: CPT | Performed by: INTERNAL MEDICINE

## 2020-12-02 PROCEDURE — 80053 COMPREHEN METABOLIC PANEL: CPT

## 2020-12-02 PROCEDURE — 2580000003 HC RX 258: Performed by: INTERNAL MEDICINE

## 2020-12-02 PROCEDURE — 2700000000 HC OXYGEN THERAPY PER DAY

## 2020-12-02 PROCEDURE — 36415 COLL VENOUS BLD VENIPUNCTURE: CPT

## 2020-12-02 PROCEDURE — 82962 GLUCOSE BLOOD TEST: CPT

## 2020-12-02 PROCEDURE — 6360000002 HC RX W HCPCS: Performed by: INTERNAL MEDICINE

## 2020-12-02 RX ADMIN — DEXAMETHASONE 6 MG: 4 TABLET ORAL at 10:28

## 2020-12-02 RX ADMIN — INSULIN LISPRO 6 UNITS: 100 INJECTION, SOLUTION INTRAVENOUS; SUBCUTANEOUS at 12:25

## 2020-12-02 RX ADMIN — INSULIN LISPRO 4 UNITS: 100 INJECTION, SOLUTION INTRAVENOUS; SUBCUTANEOUS at 12:24

## 2020-12-02 RX ADMIN — HYDROCHLOROTHIAZIDE 12.5 MG: 12.5 TABLET ORAL at 10:28

## 2020-12-02 RX ADMIN — ZINC SULFATE 220 MG (50 MG) CAPSULE 50 MG: CAPSULE at 20:51

## 2020-12-02 RX ADMIN — Medication 10 ML: at 10:29

## 2020-12-02 RX ADMIN — Medication 10 ML: at 21:50

## 2020-12-02 RX ADMIN — INSULIN LISPRO 4 UNITS: 100 INJECTION, SOLUTION INTRAVENOUS; SUBCUTANEOUS at 21:49

## 2020-12-02 RX ADMIN — INSULIN LISPRO 6 UNITS: 100 INJECTION, SOLUTION INTRAVENOUS; SUBCUTANEOUS at 19:12

## 2020-12-02 RX ADMIN — ENOXAPARIN SODIUM 40 MG: 40 INJECTION SUBCUTANEOUS at 10:28

## 2020-12-02 RX ADMIN — INSULIN GLARGINE 19 UNITS: 100 INJECTION, SOLUTION SUBCUTANEOUS at 21:48

## 2020-12-02 RX ADMIN — ALBUTEROL SULFATE 2 PUFF: 90 AEROSOL, METERED RESPIRATORY (INHALATION) at 10:30

## 2020-12-02 RX ADMIN — INSULIN LISPRO 10 UNITS: 100 INJECTION, SOLUTION INTRAVENOUS; SUBCUTANEOUS at 17:00

## 2020-12-02 RX ADMIN — SALINE NASAL SPRAY 1 SPRAY: 1.5 SOLUTION NASAL at 17:19

## 2020-12-02 RX ADMIN — ALBUTEROL SULFATE 2 PUFF: 90 AEROSOL, METERED RESPIRATORY (INHALATION) at 16:43

## 2020-12-02 RX ADMIN — ENOXAPARIN SODIUM 40 MG: 40 INJECTION SUBCUTANEOUS at 20:51

## 2020-12-02 RX ADMIN — ZINC SULFATE 220 MG (50 MG) CAPSULE 50 MG: CAPSULE at 10:28

## 2020-12-02 RX ADMIN — INSULIN LISPRO 3 UNITS: 100 INJECTION, SOLUTION INTRAVENOUS; SUBCUTANEOUS at 06:55

## 2020-12-02 RX ADMIN — ALBUTEROL SULFATE 2 PUFF: 90 AEROSOL, METERED RESPIRATORY (INHALATION) at 20:51

## 2020-12-02 RX ADMIN — Medication 1000 MG: at 10:28

## 2020-12-02 RX ADMIN — SALINE NASAL SPRAY 1 SPRAY: 1.5 SOLUTION NASAL at 10:29

## 2020-12-02 ASSESSMENT — PAIN SCALES - GENERAL
PAINLEVEL_OUTOF10: 0

## 2020-12-02 NOTE — PROGRESS NOTES
[facility]  [address] [facility], [address] [place] [address] 93, 17 [facility]      Pulmonary Progress Note      SUBJECTIVE:  Still on optiflow 61 li 80%, uses BIPAP at night and tolerates, looking similar with respiratory function but feels some better with breathing.      OBJECTIVE    Medications    Continuous Infusions:   dextrose         Scheduled Meds:   insulin glargine  0.25 Units/kg (Ideal) Subcutaneous Nightly    insulin lispro  0.08 Units/kg (Ideal) Subcutaneous TID WC    insulin lispro  0-12 Units Subcutaneous TID WC    insulin lispro  0-6 Units Subcutaneous Nightly    dexamethasone  6 mg Oral Daily    sodium chloride  1 spray Each Nostril BID    albuterol sulfate HFA  2 puff Inhalation TID    sodium chloride flush  10 mL Intravenous 2 times per day    enoxaparin  40 mg Subcutaneous BID    vitamin C  1,000 mg Oral Daily    zinc sulfate  50 mg Oral BID    valsartan  320 mg Oral Daily    And    hydroCHLOROthiazide  12.5 mg Oral Daily       PRN Meds:glucose, dextrose, glucagon (rDNA), dextrose, acetaminophen **OR** acetaminophen, sodium chloride flush, acetaminophen **OR** acetaminophen, polyethylene glycol, promethazine **OR** ondansetron, sodium chloride    Physical    VITALS:  BP (!) 110/46   Pulse 65   Temp 98 °F (36.7 °C) (Oral)   Resp 21   Ht 5' 10.5\" (1.791 m)   Wt 245 lb (111.1 kg)   SpO2 91%   BMI 34.66 kg/m²     24HR INTAKE/OUTPUT:      Intake/Output Summary (Last 24 hours) at 2020 1333  Last data filed at 2020 1154  Gross per 24 hour   Intake --   Output 1400 ml   Net -1400 ml       24HR PULSE OXIMETRY RANGE:    SpO2  Av.7 %  Min: 90 %  Max: 97 %    General appearance: alert, appears stated age and cooperative  Lungs: rhonchi bilaterally with cough  Heart: regular rate and rhythm, S1, S2 normal, no murmur, click, rub or gallop  Abdomen: soft, non-tender; bowel sounds normal; no masses,  no organomegaly  Extremities: extremities normal, atraumatic, no cyanosis or edema  Neurologic: Mental status: Alert, oriented, thought content appropriate    Data    CBC:   Recent Labs     11/30/20  0545 12/01/20  0330 12/02/20  0605   WBC 6.9 6.6 7.2   HGB 13.7 13.7 14.0   HCT 41.9 40.4 43.3   MCV 91.3 90.0 91.7    185 197       BMP:  Recent Labs     11/30/20  0545 12/01/20  0330 12/02/20  0605   * 133 132   K 4.1 4.2 4.2   CL 95* 96* 96*   CO2 27 26 26   BUN 22 23 22   CREATININE 1.1 1.0 1.0    ALB:3,BILIDIR:3,BILITOT:3,ALKPHOS:3)@    PT/INR:   No results for input(s): PROTIME, INR in the last 72 hours. ABG:   No results for input(s): PH, PO2, PCO2, HCO3, BE, O2SAT, METHB, O2HB, COHB, O2CON, HHB, THB in the last 72 hours. FiO2 : 85 %       Radiology/Other tests reviewed: CXR reviewed with slight increased GG opacities bilateral    Assessment:     Active Problems:    Pneumonia due to COVID-19 virus  Resolved Problems:    * No resolved hospital problems. *  hypoxia  DILLAN    Plan:       1. Cont with decadron, done with remdesivir yesterday  2. Cont with albuterol mdi  3. Cont with optiflow daytime and taper as tolerated, NIPPV at night which he appears to be tolerating so far  4. Nasal saline bid and flutter device, see if helps with cough and sputum production  5. OOB to chair, ambulate with assistance      Time at the bedside, reviewing labs and radiographs, reviewing notes and consultations, discussing with staff and family was more than 35 minutes. Thanks for letting us see this patient in consultation. Please contact us with any questions. Office [phone] or after hours through [employer], x [phone].

## 2020-12-02 NOTE — CARE COORDINATION
Social work / Discharge Planning:       [place] [address]. Patient is from home with wife and uses 2.5 liters at night from [facility]. Currently on 60 liters of high flow. Social work will follow to assist with discharge planning.   Electronically signed by ANA Bennett on 12/2/2020 at 10:27 AM

## 2020-12-02 NOTE — PROGRESS NOTES
Date: 12/1/2020    Time: 11:10 PM    Patient Placed On BIPAP/CPAP/ Non-Invasive Ventilation? Yes    If no must comment. Facial area red/color change? No           If YES are Blister/Lesion present? No   If yes must notify nursing staff  BIPAP/CPAP skin barrier?   Yes    Skin barrier type:mepilexlite       Comments:        Noel Glass

## 2020-12-02 NOTE — PROGRESS NOTES
[facility]) Hospitalist Progress Note    Admission Date         11/27/2020  6:21 AM  Chief Complaint        Shortness of breath  Admit Dx                    Pneumonia due to COVID-19 virus [U07.1, J12.89]     Subjective  History of Present Illness  Pt is an 86M w PMH HTN, CKD III, questionable COPD (wears 2.5-3LNC nightly) who presented to ED 11/27 COVID-positive with SOB worsening over the last ~10 days. Admits to fevers, denies fevers / chills / chest pain / nausea / diarrhea. Required 5LNC in ED though needed to be escalated to BiPAP night of admission. Interim History  Pt seen through window, remains on OptiFlow at 85%. Pulmonary following. Not in any overt distress. No new issues identified. Pt was rounded on remotely today. Review of Systems - 12-point review of systems has been reviewed and is otherwise negative except as listed in the HPI    Objective  Physical Exam  Vitals: BP (!) 110/46   Pulse 65   Temp 98 °F (36.7 °C) (Oral)   Resp 21   Ht 5' 10.5\" (1.791 m)   Wt 245 lb (111.1 kg)   SpO2 91%   BMI 34.66 kg/m²   General: well-developed, well-nourished, no acute distress, cooperative  Skin: warm, dry, intact, normal color without cyanosis  HEENT: normocephalic, atraumatic, mucous membranes normal; HFNC in place  Respiratory: clear to auscultation bilaterally without respiratory distress  Cardiovascular: regular rate and rhythm without murmur / rub / gallop  Abdominal: soft, nontender, nondistended, normoactive bowel sounds  Extremities: no mottling, pulses intact, no edema  Neurologic: awake, alert, no focal deficits  Psychiatric: normal affect, cooperative    Assessment / Plan  Acute hypoxic respiratory failure due to COVID pneumonia - 86% on room air in triage. Does not meet sepsis criteria. RFA positive for COVID in ED and CXR showed bilateral airspace opacities.   SpO2 as needed (currently on OptiFlow), wean as able, start Decadron / vitamin C / zinc.  Consult to pharmacy to dose remdesivir; CMP showed AST 64 initially, otherwise unremarkable. BMI >30 and CrCl 51; Lovenox 40 mg SQ bid for DVT ppx w plans for Eliquis 2.5 mg PO bid x30 days on dc. Placed on BiPAP evening of 11/27 and pulm consulted, input reviewed and appreciated. Switched to OptiFlow 11/30 which pt remains on     CKD III due to HTN - at baseline Cr 1.2-1.5; monitor on daily labs - 1.0 today     HTN - continue valsartan, HCTZ     Code status               Full  DVT prophylaxis       Lovenox  Disposition                Home    Due to the patient's COVID-19-positive status, to reduce exposure, contamination, and in an effort to conserve PPE, this patient was evaluated through a combination of review of the medical record, nursing assessments, other physicians' exams and assessments, as well as telemedicine interaction.     Electronically signed by Donna Simental DO on 12/2/2020 at 1:24 PM

## 2020-12-03 LAB
ALBUMIN SERPL-MCNC: 2.6 G/DL (ref 3.5–5.2)
ALP BLD-CCNC: 56 U/L (ref 40–129)
ALT SERPL-CCNC: 35 U/L (ref 0–40)
ANION GAP SERPL CALCULATED.3IONS-SCNC: 9 MMOL/L (ref 7–16)
AST SERPL-CCNC: 23 U/L (ref 0–39)
BILIRUB SERPL-MCNC: 0.4 MG/DL (ref 0–1.2)
BUN BLDV-MCNC: 25 MG/DL (ref 8–23)
CALCIUM SERPL-MCNC: 8.4 MG/DL (ref 8.6–10.2)
CHLORIDE BLD-SCNC: 96 MMOL/L (ref 98–107)
CO2: 26 MMOL/L (ref 22–29)
CREAT SERPL-MCNC: 1.1 MG/DL (ref 0.7–1.2)
FIBRINOGEN: 579 MG/DL (ref 225–540)
GFR AFRICAN AMERICAN: >60
GFR NON-AFRICAN AMERICAN: >60 ML/MIN/1.73
GLUCOSE BLD-MCNC: 249 MG/DL (ref 74–99)
HCT VFR BLD CALC: 42 % (ref 37–54)
HEMOGLOBIN: 13.9 G/DL (ref 12.5–16.5)
INR BLD: 1.1
MCH RBC QN AUTO: 30 PG (ref 26–35)
MCHC RBC AUTO-ENTMCNC: 33.1 % (ref 32–34.5)
MCV RBC AUTO: 90.7 FL (ref 80–99.9)
METER GLUCOSE: 217 MG/DL (ref 74–99)
METER GLUCOSE: 233 MG/DL (ref 74–99)
METER GLUCOSE: 311 MG/DL (ref 74–99)
METER GLUCOSE: 358 MG/DL (ref 74–99)
PDW BLD-RTO: 12.3 FL (ref 11.5–15)
PLATELET # BLD: 210 E9/L (ref 130–450)
PMV BLD AUTO: 10.7 FL (ref 7–12)
POTASSIUM SERPL-SCNC: 4.4 MMOL/L (ref 3.5–5)
PROTHROMBIN TIME: 12.6 SEC (ref 9.3–12.4)
RBC # BLD: 4.63 E12/L (ref 3.8–5.8)
SODIUM BLD-SCNC: 131 MMOL/L (ref 132–146)
TOTAL PROTEIN: 5.6 G/DL (ref 6.4–8.3)
WBC # BLD: 7.7 E9/L (ref 4.5–11.5)

## 2020-12-03 PROCEDURE — APPSS30 APP SPLIT SHARED TIME 16-30 MINUTES: Performed by: CLINICAL NURSE SPECIALIST

## 2020-12-03 PROCEDURE — 6370000000 HC RX 637 (ALT 250 FOR IP): Performed by: INTERNAL MEDICINE

## 2020-12-03 PROCEDURE — 80053 COMPREHEN METABOLIC PANEL: CPT

## 2020-12-03 PROCEDURE — 82962 GLUCOSE BLOOD TEST: CPT

## 2020-12-03 PROCEDURE — 94660 CPAP INITIATION&MGMT: CPT

## 2020-12-03 PROCEDURE — 2700000000 HC OXYGEN THERAPY PER DAY

## 2020-12-03 PROCEDURE — 85384 FIBRINOGEN ACTIVITY: CPT

## 2020-12-03 PROCEDURE — 2580000003 HC RX 258: Performed by: INTERNAL MEDICINE

## 2020-12-03 PROCEDURE — 6360000002 HC RX W HCPCS: Performed by: INTERNAL MEDICINE

## 2020-12-03 PROCEDURE — 99232 SBSQ HOSP IP/OBS MODERATE 35: CPT | Performed by: INTERNAL MEDICINE

## 2020-12-03 PROCEDURE — 85610 PROTHROMBIN TIME: CPT

## 2020-12-03 PROCEDURE — 36415 COLL VENOUS BLD VENIPUNCTURE: CPT

## 2020-12-03 PROCEDURE — 85027 COMPLETE CBC AUTOMATED: CPT

## 2020-12-03 PROCEDURE — 2060000000 HC ICU INTERMEDIATE R&B

## 2020-12-03 RX ADMIN — INSULIN LISPRO 6 UNITS: 100 INJECTION, SOLUTION INTRAVENOUS; SUBCUTANEOUS at 13:58

## 2020-12-03 RX ADMIN — INSULIN LISPRO 6 UNITS: 100 INJECTION, SOLUTION INTRAVENOUS; SUBCUTANEOUS at 11:00

## 2020-12-03 RX ADMIN — INSULIN LISPRO 8 UNITS: 100 INJECTION, SOLUTION INTRAVENOUS; SUBCUTANEOUS at 18:36

## 2020-12-03 RX ADMIN — ENOXAPARIN SODIUM 40 MG: 40 INJECTION SUBCUTANEOUS at 08:18

## 2020-12-03 RX ADMIN — Medication 1000 MG: at 08:17

## 2020-12-03 RX ADMIN — ALBUTEROL SULFATE 2 PUFF: 90 AEROSOL, METERED RESPIRATORY (INHALATION) at 21:21

## 2020-12-03 RX ADMIN — SALINE NASAL SPRAY 1 SPRAY: 1.5 SOLUTION NASAL at 14:51

## 2020-12-03 RX ADMIN — ZINC SULFATE 220 MG (50 MG) CAPSULE 50 MG: CAPSULE at 08:18

## 2020-12-03 RX ADMIN — VALSARTAN 320 MG: 320 TABLET ORAL at 08:17

## 2020-12-03 RX ADMIN — INSULIN LISPRO 6 UNITS: 100 INJECTION, SOLUTION INTRAVENOUS; SUBCUTANEOUS at 18:38

## 2020-12-03 RX ADMIN — HYDROCHLOROTHIAZIDE 12.5 MG: 12.5 TABLET ORAL at 08:18

## 2020-12-03 RX ADMIN — ENOXAPARIN SODIUM 40 MG: 40 INJECTION SUBCUTANEOUS at 21:21

## 2020-12-03 RX ADMIN — Medication 10 ML: at 08:18

## 2020-12-03 RX ADMIN — ZINC SULFATE 220 MG (50 MG) CAPSULE 50 MG: CAPSULE at 22:25

## 2020-12-03 RX ADMIN — INSULIN LISPRO 4 UNITS: 100 INJECTION, SOLUTION INTRAVENOUS; SUBCUTANEOUS at 06:28

## 2020-12-03 RX ADMIN — SALINE NASAL SPRAY 1 SPRAY: 1.5 SOLUTION NASAL at 08:20

## 2020-12-03 RX ADMIN — ALBUTEROL SULFATE 2 PUFF: 90 AEROSOL, METERED RESPIRATORY (INHALATION) at 08:22

## 2020-12-03 RX ADMIN — DEXAMETHASONE 6 MG: 4 TABLET ORAL at 08:16

## 2020-12-03 RX ADMIN — INSULIN LISPRO 4 UNITS: 100 INJECTION, SOLUTION INTRAVENOUS; SUBCUTANEOUS at 13:57

## 2020-12-03 RX ADMIN — Medication 10 ML: at 21:20

## 2020-12-03 RX ADMIN — INSULIN GLARGINE 19 UNITS: 100 INJECTION, SOLUTION SUBCUTANEOUS at 21:20

## 2020-12-03 RX ADMIN — ALBUTEROL SULFATE 2 PUFF: 90 AEROSOL, METERED RESPIRATORY (INHALATION) at 14:32

## 2020-12-03 RX ADMIN — INSULIN LISPRO 5 UNITS: 100 INJECTION, SOLUTION INTRAVENOUS; SUBCUTANEOUS at 21:20

## 2020-12-03 NOTE — PROGRESS NOTES
[facility] [address] [facility]  [address] [facility], [address], [address]      Pulmonary Progress Note      SUBJECTIVE:  Still on optiflow 60 li 88%, uses BIPAP at night and tolerates, looking similar with respiratory function with mod cough/sputum production.      OBJECTIVE    Medications    Continuous Infusions:   dextrose         Scheduled Meds:   insulin glargine  0.25 Units/kg (Ideal) Subcutaneous Nightly    insulin lispro  0.08 Units/kg (Ideal) Subcutaneous TID WC    insulin lispro  0-12 Units Subcutaneous TID WC    insulin lispro  0-6 Units Subcutaneous Nightly    dexamethasone  6 mg Oral Daily    sodium chloride  1 spray Each Nostril BID    albuterol sulfate HFA  2 puff Inhalation TID    sodium chloride flush  10 mL Intravenous 2 times per day    enoxaparin  40 mg Subcutaneous BID    vitamin C  1,000 mg Oral Daily    zinc sulfate  50 mg Oral BID    valsartan  320 mg Oral Daily    And    hydroCHLOROthiazide  12.5 mg Oral Daily       PRN Meds:glucose, dextrose, glucagon (rDNA), dextrose, acetaminophen **OR** acetaminophen, sodium chloride flush, acetaminophen **OR** acetaminophen, polyethylene glycol, promethazine **OR** ondansetron, sodium chloride    Physical    VITALS:  /63   Pulse 62   Temp 98.1 °F (36.7 °C) (Oral)   Resp 20   Ht 5' 10.5\" (1.791 m)   Wt 245 lb (111.1 kg)   SpO2 (!) 89%   BMI 34.66 kg/m²     24HR INTAKE/OUTPUT:    No intake or output data in the 24 hours ending 20 1446    24HR PULSE OXIMETRY RANGE:    SpO2  Av.3 %  Min: 89 %  Max: 94 %    General appearance: alert, appears stated age and cooperative  Lungs: rhonchi bilaterally with cough  Heart: regular rate and rhythm, S1, S2 normal, no murmur, click, rub or gallop  Abdomen: soft, non-tender; bowel sounds normal; no masses,  no organomegaly  Extremities: extremities normal, atraumatic, no cyanosis or edema  Neurologic: Mental status: Alert, oriented, thought content appropriate    Data    CBC:   Recent Labs     12/01/20  0330 12/02/20  0605 12/03/20  0555   WBC 6.6 7.2 7.7   HGB 13.7 14.0 13.9   HCT 40.4 43.3 42.0   MCV 90.0 91.7 90.7    197 210       BMP:  Recent Labs     12/01/20  0330 12/02/20  0605 12/03/20  0555    132 131*   K 4.2 4.2 4.4   CL 96* 96* 96*   CO2 26 26 26   BUN 23 22 25*   CREATININE 1.0 1.0 1.1    ALB:3,BILIDIR:3,BILITOT:3,ALKPHOS:3)@    PT/INR:   No results for input(s): PROTIME, INR in the last 72 hours. ABG:   No results for input(s): PH, PO2, PCO2, HCO3, BE, O2SAT, METHB, O2HB, COHB, O2CON, HHB, THB in the last 72 hours. FiO2 : (S) 90 %       Radiology/Other tests reviewed: CXR reviewed with slight increased GG opacities bilateral    Assessment:     Active Problems:    Pneumonia due to COVID-19 virus  Resolved Problems:    * No resolved hospital problems. *  hypoxia  DILLAN    Plan:       1. Cont with decadron  2. Cont with albuterol mdi  3. Cont with optiflow daytime and taper as tolerated, NIPPV at night which he appears to be tolerating so far, prone positioning if able to  4. Nasal saline bid and flutter device, see if helps with cough and sputum production  5. OOB to chair, ambulate with assistance  6. May need to consider acute rehab for increased oxygen requirements and PT/OT      Time at the bedside, reviewing labs and radiographs, reviewing notes and consultations, discussing with staff and family was more than 35 minutes. Thanks for letting us see this patient in consultation. Please contact us with any questions. Office [phone] or after hours through [employer], x [phone].

## 2020-12-03 NOTE — PROGRESS NOTES
Date: 12/2/2020    Time: 11:02 PM    Patient Placed On BIPAP/CPAP/ Non-Invasive Ventilation? Yes    If no must comment. Facial area red/color change? No           If YES are Blister/Lesion present? No   If yes must notify nursing staff  BIPAP/CPAP skin barrier?   Yes    Skin barrier type:mepilexlite       Comments:        Montez Lam

## 2020-12-03 NOTE — PROGRESS NOTES
[facility] Progress Note    Admitting Date and Time: 11/27/2020  6:21 AM  Admit Dx: Pneumonia due to COVID-19 virus [U07.1, J12.89]  Pneumonia due to COVID-19 virus [U07.1, J12.89]    Subjective:  Patient is being followed for Pneumonia due to COVID-19 virus [U07.1, J12.89]  Pneumonia due to COVID-19 virus [U07.1, J12.89]   Pt feels okay he stated he is on OptiFlow about 60 L oxygen. Wears the BiPAP at night or when he gets up he puts the OptiFlow back on. Appetite is a little bit better eating and drinking well. He was on the phone conversing fairly well. Reporting no nausea vomiting or diarrhea to me  Per RN: Verified OptiFlow    ROS: denies fever, chills, cp, +sob, n/v, HA unless stated above.       insulin glargine  0.25 Units/kg (Ideal) Subcutaneous Nightly    insulin lispro  0.08 Units/kg (Ideal) Subcutaneous TID WC    insulin lispro  0-12 Units Subcutaneous TID WC    insulin lispro  0-6 Units Subcutaneous Nightly    dexamethasone  6 mg Oral Daily    sodium chloride  1 spray Each Nostril BID    albuterol sulfate HFA  2 puff Inhalation TID    sodium chloride flush  10 mL Intravenous 2 times per day    enoxaparin  40 mg Subcutaneous BID    vitamin C  1,000 mg Oral Daily    zinc sulfate  50 mg Oral BID    valsartan  320 mg Oral Daily    And    hydroCHLOROthiazide  12.5 mg Oral Daily     glucose, 15 g, PRN  dextrose, 12.5 g, PRN  glucagon (rDNA), 1 mg, PRN  dextrose, 100 mL/hr, PRN  acetaminophen, 650 mg, Q6H PRN    Or  acetaminophen, 650 mg, Q6H PRN  sodium chloride flush, 10 mL, PRN  acetaminophen, 650 mg, Q6H PRN    Or  acetaminophen, 650 mg, Q6H PRN  polyethylene glycol, 17 g, Daily PRN  promethazine, 12.5 mg, Q6H PRN    Or  ondansetron, 4 mg, Q6H PRN  sodium chloride, 30 mL, PRN         Objective:    BP (!) 150/86   Pulse 67   Temp 98.8 °F (37.1 °C) (Oral)   Resp 22   Ht 5' 10.5\" (1.791 m)   Wt 245 lb (111.1 kg)   SpO2 94%   BMI 34.66 kg/m²     General Appearance: [place] 90 by PCR  Not Detected  Not Detected  Final  11/27/2020  7:19 AM  [facility]    Coronavirus NL63 by PCR  Not Detected  Not Detected  Final  11/27/2020  7:19 AM  [facility]    Coronavirus OC43 by PCR  Not Detected  Not Detected  Final  11/27/2020  7:19 AM  [facility]    SARS-CoV-2, PCR  DETECTEDAbnormal    Not Detected  Final  11/27/2020  7:19 AM  [facility]              Labs 11/28/2020    Fibrinogen 648  D-dimer 764      Radiology:   CXR 12 1 2  FINDINGS:    The cardiomediastinal contours are unchanged.  There are peripheral confluent    airspace opacities within the bilateral upper and bilateral lower lobes,    progressed compared to the prior examination.  There is no visible pleural    effusion or pneumothorax.  The pulmonary vessels are within normal limits. There is stable nonspecific elevation of the right hemidiaphragm.         Impression    Worsening aeration both lungs. Assessment:    Active Problems:    Pneumonia due to COVID-19 virus  Resolved Problems:    * No resolved hospital problems. *      Plan:  1. Acute hypoxic respiratory failure secondary to COVID-19 pneumonia  -Decompensated 86% on room air in the triage area. -COVID-19 positive    -Patient still on OptiFlow 60+ liters    -Continue Decadron -day 7 right now  -Pharmacy to dose remdesivir -completed 12/1/2020  -Continue vitamin C, zinc  -Pulmonary consult -notes appreciated.   -Continue albuterol  -Continue OptiFlow, NIPPV at night      Chronic kidney disease stage III  -Monitor labs      Hypertension  -Monitor vitals  -Continue valsartan and hydrochlorothiazide      Diabetes type 2-impaired fasting glucose  -Continue with sliding scale, Lantus      Hyponatremia -131 today, has been fluctuating  -Monitoring labs      Daily labs, repeat Covid associated labs 12/4/2020    CODE STATUS full  DVT prophylaxis on Lovenox  Diet carb controlled diet      NOTE: This report was transcribed using voice recognition software. Every effort was made to ensure accuracy; however, inadvertent computerized transcription errors may be present.   Electronically signed by YESIKA Hansen on 12/3/2020 at 8:05 AM

## 2020-12-04 LAB
ALBUMIN SERPL-MCNC: 2.5 G/DL (ref 3.5–5.2)
ALP BLD-CCNC: 61 U/L (ref 40–129)
ALT SERPL-CCNC: 29 U/L (ref 0–40)
ANION GAP SERPL CALCULATED.3IONS-SCNC: 9 MMOL/L (ref 7–16)
APTT: 27 SEC (ref 24.5–35.1)
AST SERPL-CCNC: 23 U/L (ref 0–39)
BASOPHILS ABSOLUTE: 0.04 E9/L (ref 0–0.2)
BASOPHILS RELATIVE PERCENT: 0.5 % (ref 0–2)
BILIRUB SERPL-MCNC: 0.4 MG/DL (ref 0–1.2)
BILIRUBIN DIRECT: 0.2 MG/DL (ref 0–0.3)
BILIRUBIN, INDIRECT: 0.2 MG/DL (ref 0–1)
BUN BLDV-MCNC: 26 MG/DL (ref 8–23)
C-REACTIVE PROTEIN: 1.5 MG/DL (ref 0–0.4)
CALCIUM SERPL-MCNC: 8.5 MG/DL (ref 8.6–10.2)
CHLORIDE BLD-SCNC: 94 MMOL/L (ref 98–107)
CO2: 25 MMOL/L (ref 22–29)
CREAT SERPL-MCNC: 1.1 MG/DL (ref 0.7–1.2)
D DIMER: 461 NG/ML DDU
EOSINOPHILS ABSOLUTE: 0 E9/L (ref 0.05–0.5)
EOSINOPHILS RELATIVE PERCENT: 0 % (ref 0–6)
FIBRINOGEN: 550 MG/DL (ref 225–540)
GFR AFRICAN AMERICAN: >60
GFR NON-AFRICAN AMERICAN: >60 ML/MIN/1.73
GLUCOSE BLD-MCNC: 297 MG/DL (ref 74–99)
HCT VFR BLD CALC: 41.3 % (ref 37–54)
HEMOGLOBIN: 13.9 G/DL (ref 12.5–16.5)
IMMATURE GRANULOCYTES #: 0.38 E9/L
IMMATURE GRANULOCYTES %: 4.7 % (ref 0–5)
LACTATE DEHYDROGENASE: 251 U/L (ref 135–225)
LYMPHOCYTES ABSOLUTE: 1 E9/L (ref 1.5–4)
LYMPHOCYTES RELATIVE PERCENT: 12.4 % (ref 20–42)
MCH RBC QN AUTO: 30 PG (ref 26–35)
MCHC RBC AUTO-ENTMCNC: 33.7 % (ref 32–34.5)
MCV RBC AUTO: 89.2 FL (ref 80–99.9)
METER GLUCOSE: 295 MG/DL (ref 74–99)
METER GLUCOSE: 307 MG/DL (ref 74–99)
METER GLUCOSE: 317 MG/DL (ref 74–99)
METER GLUCOSE: 358 MG/DL (ref 74–99)
MONOCYTES ABSOLUTE: 0.59 E9/L (ref 0.1–0.95)
MONOCYTES RELATIVE PERCENT: 7.3 % (ref 2–12)
NEUTROPHILS ABSOLUTE: 6.03 E9/L (ref 1.8–7.3)
NEUTROPHILS RELATIVE PERCENT: 75.1 % (ref 43–80)
PDW BLD-RTO: 12.5 FL (ref 11.5–15)
PLATELET # BLD: 204 E9/L (ref 130–450)
PMV BLD AUTO: 11 FL (ref 7–12)
POTASSIUM SERPL-SCNC: 4.6 MMOL/L (ref 3.5–5)
RBC # BLD: 4.63 E12/L (ref 3.8–5.8)
SODIUM BLD-SCNC: 128 MMOL/L (ref 132–146)
TOTAL PROTEIN: 5.7 G/DL (ref 6.4–8.3)
WBC # BLD: 8 E9/L (ref 4.5–11.5)

## 2020-12-04 PROCEDURE — 2060000000 HC ICU INTERMEDIATE R&B

## 2020-12-04 PROCEDURE — 2700000000 HC OXYGEN THERAPY PER DAY

## 2020-12-04 PROCEDURE — 85384 FIBRINOGEN ACTIVITY: CPT

## 2020-12-04 PROCEDURE — 99232 SBSQ HOSP IP/OBS MODERATE 35: CPT | Performed by: INTERNAL MEDICINE

## 2020-12-04 PROCEDURE — 80053 COMPREHEN METABOLIC PANEL: CPT

## 2020-12-04 PROCEDURE — 6360000002 HC RX W HCPCS: Performed by: INTERNAL MEDICINE

## 2020-12-04 PROCEDURE — 86140 C-REACTIVE PROTEIN: CPT

## 2020-12-04 PROCEDURE — 94660 CPAP INITIATION&MGMT: CPT

## 2020-12-04 PROCEDURE — 85378 FIBRIN DEGRADE SEMIQUANT: CPT

## 2020-12-04 PROCEDURE — 82728 ASSAY OF FERRITIN: CPT

## 2020-12-04 PROCEDURE — 82962 GLUCOSE BLOOD TEST: CPT

## 2020-12-04 PROCEDURE — 85730 THROMBOPLASTIN TIME PARTIAL: CPT

## 2020-12-04 PROCEDURE — 85025 COMPLETE CBC W/AUTO DIFF WBC: CPT

## 2020-12-04 PROCEDURE — 82248 BILIRUBIN DIRECT: CPT

## 2020-12-04 PROCEDURE — 94761 N-INVAS EAR/PLS OXIMETRY MLT: CPT

## 2020-12-04 PROCEDURE — APPSS30 APP SPLIT SHARED TIME 16-30 MINUTES: Performed by: CLINICAL NURSE SPECIALIST

## 2020-12-04 PROCEDURE — 2580000003 HC RX 258: Performed by: CLINICAL NURSE SPECIALIST

## 2020-12-04 PROCEDURE — 2580000003 HC RX 258: Performed by: INTERNAL MEDICINE

## 2020-12-04 PROCEDURE — 83615 LACTATE (LD) (LDH) ENZYME: CPT

## 2020-12-04 PROCEDURE — 36415 COLL VENOUS BLD VENIPUNCTURE: CPT

## 2020-12-04 PROCEDURE — 6370000000 HC RX 637 (ALT 250 FOR IP): Performed by: INTERNAL MEDICINE

## 2020-12-04 RX ORDER — SODIUM CHLORIDE 9 MG/ML
INJECTION, SOLUTION INTRAVENOUS CONTINUOUS
Status: DISCONTINUED | OUTPATIENT
Start: 2020-12-04 | End: 2020-12-05

## 2020-12-04 RX ADMIN — Medication 10 ML: at 08:44

## 2020-12-04 RX ADMIN — ALBUTEROL SULFATE 2 PUFF: 90 AEROSOL, METERED RESPIRATORY (INHALATION) at 08:54

## 2020-12-04 RX ADMIN — INSULIN LISPRO 6 UNITS: 100 INJECTION, SOLUTION INTRAVENOUS; SUBCUTANEOUS at 18:42

## 2020-12-04 RX ADMIN — SODIUM CHLORIDE: 9 INJECTION, SOLUTION INTRAVENOUS at 08:42

## 2020-12-04 RX ADMIN — INSULIN LISPRO 6 UNITS: 100 INJECTION, SOLUTION INTRAVENOUS; SUBCUTANEOUS at 11:36

## 2020-12-04 RX ADMIN — VALSARTAN 320 MG: 320 TABLET ORAL at 08:44

## 2020-12-04 RX ADMIN — Medication 10 ML: at 21:05

## 2020-12-04 RX ADMIN — ZINC SULFATE 220 MG (50 MG) CAPSULE 50 MG: CAPSULE at 21:05

## 2020-12-04 RX ADMIN — DEXAMETHASONE 6 MG: 4 TABLET ORAL at 08:43

## 2020-12-04 RX ADMIN — ZINC SULFATE 220 MG (50 MG) CAPSULE 50 MG: CAPSULE at 08:43

## 2020-12-04 RX ADMIN — SODIUM CHLORIDE: 9 INJECTION, SOLUTION INTRAVENOUS at 21:05

## 2020-12-04 RX ADMIN — INSULIN LISPRO 5 UNITS: 100 INJECTION, SOLUTION INTRAVENOUS; SUBCUTANEOUS at 21:23

## 2020-12-04 RX ADMIN — INSULIN LISPRO 8 UNITS: 100 INJECTION, SOLUTION INTRAVENOUS; SUBCUTANEOUS at 06:11

## 2020-12-04 RX ADMIN — INSULIN GLARGINE 19 UNITS: 100 INJECTION, SOLUTION SUBCUTANEOUS at 21:23

## 2020-12-04 RX ADMIN — ALBUTEROL SULFATE 2 PUFF: 90 AEROSOL, METERED RESPIRATORY (INHALATION) at 21:05

## 2020-12-04 RX ADMIN — ENOXAPARIN SODIUM 40 MG: 40 INJECTION SUBCUTANEOUS at 21:04

## 2020-12-04 RX ADMIN — Medication 1000 MG: at 08:43

## 2020-12-04 RX ADMIN — INSULIN LISPRO 8 UNITS: 100 INJECTION, SOLUTION INTRAVENOUS; SUBCUTANEOUS at 18:41

## 2020-12-04 RX ADMIN — SALINE NASAL SPRAY 1 SPRAY: 1.5 SOLUTION NASAL at 08:55

## 2020-12-04 RX ADMIN — ENOXAPARIN SODIUM 40 MG: 40 INJECTION SUBCUTANEOUS at 08:43

## 2020-12-04 RX ADMIN — HYDROCHLOROTHIAZIDE 12.5 MG: 12.5 TABLET ORAL at 08:44

## 2020-12-04 RX ADMIN — SALINE NASAL SPRAY 1 SPRAY: 1.5 SOLUTION NASAL at 16:34

## 2020-12-04 ASSESSMENT — PAIN SCALES - GENERAL
PAINLEVEL_OUTOF10: 0
PAINLEVEL_OUTOF10: 0

## 2020-12-04 NOTE — CARE COORDINATION
CM NOTE: Chart reviewed. Covid positive in droplet plus isolation. Continues on decadron & completed course RDV. Currently using O2 via optiflow 60L @ 70%. Using BiPAP @ HS. Self proning as able. Plan remains home when ready for discharge. Will continue to follow pt.

## 2020-12-04 NOTE — PROGRESS NOTES
[facility] Progress Note    Admitting Date and Time: 11/27/2020  6:21 AM  Admit Dx: Pneumonia due to COVID-19 virus [U07.1, J12.89]  Pneumonia due to COVID-19 virus [U07.1, J12.89]    Subjective:  Patient is being followed for Pneumonia due to COVID-19 virus [U07.1, J12.89]  Pneumonia due to COVID-19 virus [U07.1, J12.89]   Pt up in bed pleasant talkative stated he is having some improving breathing no significant shortness of breath at this time. Still very oxygen dependent on 60 L OptiFlow. No nausea vomiting or diarrhea  Per RN: Verified OptiFlow    ROS: denies fever, chills, cp, +sob -little better, n/v, HA unless stated above.       insulin glargine  0.25 Units/kg (Ideal) Subcutaneous Nightly    insulin lispro  0.08 Units/kg (Ideal) Subcutaneous TID WC    insulin lispro  0-12 Units Subcutaneous TID WC    insulin lispro  0-6 Units Subcutaneous Nightly    dexamethasone  6 mg Oral Daily    sodium chloride  1 spray Each Nostril BID    albuterol sulfate HFA  2 puff Inhalation TID    sodium chloride flush  10 mL Intravenous 2 times per day    enoxaparin  40 mg Subcutaneous BID    vitamin C  1,000 mg Oral Daily    zinc sulfate  50 mg Oral BID    valsartan  320 mg Oral Daily    And    hydroCHLOROthiazide  12.5 mg Oral Daily     glucose, 15 g, PRN  dextrose, 12.5 g, PRN  glucagon (rDNA), 1 mg, PRN  dextrose, 100 mL/hr, PRN  acetaminophen, 650 mg, Q6H PRN    Or  acetaminophen, 650 mg, Q6H PRN  sodium chloride flush, 10 mL, PRN  acetaminophen, 650 mg, Q6H PRN    Or  acetaminophen, 650 mg, Q6H PRN  polyethylene glycol, 17 g, Daily PRN  promethazine, 12.5 mg, Q6H PRN    Or  ondansetron, 4 mg, Q6H PRN  sodium chloride, 30 mL, PRN         Objective:    BP (!) 162/80   Pulse 69   Temp 97.9 °F (36.6 °C) (Oral)   Resp 19   Ht 5' 10.5\" (1.791 m)   Wt 245 lb (111.1 kg)   SpO2 90%   BMI 34.66 kg/m²     General Appearance: alert and oriented to person, place and time and in no acute distress. Sitting in bed.   Skin: warm and dry  Head: normocephalic and atraumatic  Eyes: pupils equal, round, and reactive to light, extraocular eye movements intact, conjunctivae normal  Neck: neck supple and non tender without mass   Pulmonary/Chest: clear to auscultation bilaterally- no wheezes, rales or rhonchi, normal air movement, no respiratory distress   -OptiFlow 60 L  Cardiovascular: normal rate, normal S1 and S2 and no carotid bruits  Abdomen: soft, non-tender, non-distended, normal bowel sounds, no masses or organomegaly  Extremities: no cyanosis, no clubbing and no edema  Neurologic: no cranial nerve deficit and speech normal  Hep-Lock      Recent Labs     12/02/20  0605 12/03/20  0555 12/04/20  0606    131* 128*   K 4.2 4.4 4.6   CL 96* 96* 94*   CO2 26 26 25   BUN 22 25* 26*   CREATININE 1.0 1.1 1.1   GLUCOSE 282* 249* 297*   CALCIUM 8.4* 8.4* 8.5*       Recent Labs     12/02/20  0605 12/03/20  0555 12/04/20  0606   WBC 7.2 7.7 8.0   RBC 4.72 4.63 4.63   HGB 14.0 13.9 13.9   HCT 43.3 42.0 41.3   MCV 91.7 90.7 89.2   MCH 29.7 30.0 30.0   MCHC 32.3 33.1 33.7   RDW 12.2 12.3 12.5    210 204   MPV 10.6 10.7 11.0     11/27/2020  2:08 PM - Anupam, y Incoming Results From [facility]     Specimen Information:  Nasopharyngeal          Component  Value  Ref Range & Units  Status  Collected  Lab    Adenovirus by PCR  Not Detected  Not Detected  Final  11/27/2020  7:19 AM  [facility]    Bordetella parapertussis by PCR  Not Detected  Not Detected  Final  11/27/2020  7:19 AM  [facility]    Bordetella pertussis by PCR  Not Detected  Not Detected  Final  11/27/2020  7:19 AM  [facility]    Chlamydophilia pneumoniae by PCR  Not Detected  Not Detected  Final  11/27/2020  7:19 AM  [facility]    Coronavirus 229E by PCR  Not Detected  Not Detected  Final  11/27/2020  7:19 AM  [facility] Coronavirus HKU1 by PCR  Not Detected  Not Detected  Final  11/27/2020  7:19 AM  [facility]    Coronavirus NL63 by PCR  Not Detected  Not Detected  Final  11/27/2020  7:19 AM  [facility]    Coronavirus OC43 by PCR  Not Detected  Not Detected  Final  11/27/2020  7:19 AM  [facility]    SARS-CoV-2, PCR  DETECTEDAbnormal    Not Detected  Final  11/27/2020  7:19 AM  [facility]              Labs 11/28/2020    Fibrinogen 648  D-dimer 764      Radiology:   CXR 12 1 2  FINDINGS:    The cardiomediastinal contours are unchanged.  There are peripheral confluent    airspace opacities within the bilateral upper and bilateral lower lobes,    progressed compared to the prior examination.  There is no visible pleural    effusion or pneumothorax.  The pulmonary vessels are within normal limits. There is stable nonspecific elevation of the right hemidiaphragm.         Impression    Worsening aeration both lungs. Assessment:    Active Problems:    Pneumonia due to COVID-19 virus  Resolved Problems:    * No resolved hospital problems. *      Plan:  1. Acute hypoxic respiratory failure secondary to COVID-19 pneumonia  -Decompensated 86% on room air in the triage area. -COVID-19 positive    -Patient still on OptiFlow 60+ liters    -Continue Decadron -day 8 right now  -Pharmacy to dose remdesivir -completed 12/1/2020  -Continue vitamin C, zinc  -Pulmonary consult -notes appreciated.   -Continue albuterol  -Continue OptiFlow, NIPPV at night  -Okay out of bed to chair      Chronic kidney disease stage III  -Monitor labs      Hypertension  -Monitor vitals  -Continue valsartan and hydrochlorothiazide      Diabetes type 2-impaired fasting glucose  -Continue with sliding scale, Lantus  -Hypoglycemia improved      Hyponatremia -131- 128 today  -Monitoring labs    - start gentle hydration 0.9% ns at 50 ML---bun liitle up 26, crt 1.1  -He had been getting dextrose 5 and water at 100 mils an hour for hypoglycemia going to stop this and change now    Daily labs, repeat Covid associated labs 12/4/2020    CODE STATUS full  DVT prophylaxis on Lovenox  Diet carb controlled diet      NOTE: This report was transcribed using voice recognition software. Every effort was made to ensure accuracy; however, inadvertent computerized transcription errors may be present.   Electronically signed by YESIKA Collado on 12/4/2020 at 8:10 AM

## 2020-12-04 NOTE — PROGRESS NOTES
Date: 12/3/2020    Time: 9:34 PM    Patient Placed On BIPAP/CPAP/ Non-Invasive Ventilation? Yes    If no must comment. Facial area red/color change? Yes           If YES are Blister/Lesion present? Yes   If yes must notify nursing staff (RN notified)  BIPAP/CPAP skin barrier?   Yes   Skin barrier type:mepilexlite       Comments:       12/03/20 2131   NIV Type   NIV Started/Stopped On   Equipment Type V60   Mode Bilevel   Mask Type Full face mask   Mask Size Medium   Settings/Measurements   IPAP 12 cmH20   CPAP/EPAP 6 cmH2O   Rate Ordered 12   Resp 27   FiO2  80 %   Vt Exhaled 738 mL   Minute Volume 19.9 Liters   Mask Leak (lpm) 26 lpm   Comfort Level Good   Using Accessory Muscles No   SpO2 90       Amy Jordan

## 2020-12-05 LAB
ALBUMIN SERPL-MCNC: 2.5 G/DL (ref 3.5–5.2)
ALP BLD-CCNC: 65 U/L (ref 40–129)
ALT SERPL-CCNC: 30 U/L (ref 0–40)
ANION GAP SERPL CALCULATED.3IONS-SCNC: 8 MMOL/L (ref 7–16)
AST SERPL-CCNC: 24 U/L (ref 0–39)
BASOPHILS ABSOLUTE: 0.04 E9/L (ref 0–0.2)
BASOPHILS RELATIVE PERCENT: 0.4 % (ref 0–2)
BILIRUB SERPL-MCNC: 0.4 MG/DL (ref 0–1.2)
BUN BLDV-MCNC: 28 MG/DL (ref 8–23)
CALCIUM SERPL-MCNC: 8.3 MG/DL (ref 8.6–10.2)
CHLORIDE BLD-SCNC: 96 MMOL/L (ref 98–107)
CO2: 27 MMOL/L (ref 22–29)
CREAT SERPL-MCNC: 1.1 MG/DL (ref 0.7–1.2)
EOSINOPHILS ABSOLUTE: 0.01 E9/L (ref 0.05–0.5)
EOSINOPHILS RELATIVE PERCENT: 0.1 % (ref 0–6)
GFR AFRICAN AMERICAN: >60
GFR NON-AFRICAN AMERICAN: >60 ML/MIN/1.73
GLUCOSE BLD-MCNC: 230 MG/DL (ref 74–99)
HCT VFR BLD CALC: 41.3 % (ref 37–54)
HEMOGLOBIN: 13.7 G/DL (ref 12.5–16.5)
IMMATURE GRANULOCYTES #: 0.38 E9/L
IMMATURE GRANULOCYTES %: 4.2 % (ref 0–5)
LYMPHOCYTES ABSOLUTE: 1.26 E9/L (ref 1.5–4)
LYMPHOCYTES RELATIVE PERCENT: 13.9 % (ref 20–42)
MCH RBC QN AUTO: 29.9 PG (ref 26–35)
MCHC RBC AUTO-ENTMCNC: 33.2 % (ref 32–34.5)
MCV RBC AUTO: 90.2 FL (ref 80–99.9)
METER GLUCOSE: 200 MG/DL (ref 74–99)
METER GLUCOSE: 214 MG/DL (ref 74–99)
METER GLUCOSE: 330 MG/DL (ref 74–99)
METER GLUCOSE: 344 MG/DL (ref 74–99)
MONOCYTES ABSOLUTE: 0.7 E9/L (ref 0.1–0.95)
MONOCYTES RELATIVE PERCENT: 7.7 % (ref 2–12)
NEUTROPHILS ABSOLUTE: 6.65 E9/L (ref 1.8–7.3)
NEUTROPHILS RELATIVE PERCENT: 73.7 % (ref 43–80)
PDW BLD-RTO: 12.5 FL (ref 11.5–15)
PLATELET # BLD: 221 E9/L (ref 130–450)
PMV BLD AUTO: 11.3 FL (ref 7–12)
POTASSIUM SERPL-SCNC: 4.4 MMOL/L (ref 3.5–5)
RBC # BLD: 4.58 E12/L (ref 3.8–5.8)
SODIUM BLD-SCNC: 131 MMOL/L (ref 132–146)
TOTAL PROTEIN: 5.4 G/DL (ref 6.4–8.3)
WBC # BLD: 9 E9/L (ref 4.5–11.5)

## 2020-12-05 PROCEDURE — 2060000000 HC ICU INTERMEDIATE R&B

## 2020-12-05 PROCEDURE — 2580000003 HC RX 258: Performed by: CLINICAL NURSE SPECIALIST

## 2020-12-05 PROCEDURE — 94660 CPAP INITIATION&MGMT: CPT

## 2020-12-05 PROCEDURE — 2700000000 HC OXYGEN THERAPY PER DAY

## 2020-12-05 PROCEDURE — 85025 COMPLETE CBC W/AUTO DIFF WBC: CPT

## 2020-12-05 PROCEDURE — 36415 COLL VENOUS BLD VENIPUNCTURE: CPT

## 2020-12-05 PROCEDURE — 6360000002 HC RX W HCPCS: Performed by: INTERNAL MEDICINE

## 2020-12-05 PROCEDURE — 82962 GLUCOSE BLOOD TEST: CPT

## 2020-12-05 PROCEDURE — 6370000000 HC RX 637 (ALT 250 FOR IP): Performed by: INTERNAL MEDICINE

## 2020-12-05 PROCEDURE — 80053 COMPREHEN METABOLIC PANEL: CPT

## 2020-12-05 PROCEDURE — 99232 SBSQ HOSP IP/OBS MODERATE 35: CPT | Performed by: INTERNAL MEDICINE

## 2020-12-05 PROCEDURE — APPSS30 APP SPLIT SHARED TIME 16-30 MINUTES: Performed by: CLINICAL NURSE SPECIALIST

## 2020-12-05 PROCEDURE — 2580000003 HC RX 258: Performed by: INTERNAL MEDICINE

## 2020-12-05 RX ORDER — SODIUM CHLORIDE 9 MG/ML
INJECTION, SOLUTION INTRAVENOUS CONTINUOUS
Status: DISCONTINUED | OUTPATIENT
Start: 2020-12-05 | End: 2020-12-09

## 2020-12-05 RX ADMIN — INSULIN LISPRO 8 UNITS: 100 INJECTION, SOLUTION INTRAVENOUS; SUBCUTANEOUS at 17:24

## 2020-12-05 RX ADMIN — ALBUTEROL SULFATE 2 PUFF: 90 AEROSOL, METERED RESPIRATORY (INHALATION) at 14:11

## 2020-12-05 RX ADMIN — HYDROCHLOROTHIAZIDE 12.5 MG: 12.5 TABLET ORAL at 09:00

## 2020-12-05 RX ADMIN — SALINE NASAL SPRAY 1 SPRAY: 1.5 SOLUTION NASAL at 09:00

## 2020-12-05 RX ADMIN — INSULIN LISPRO 6 UNITS: 100 INJECTION, SOLUTION INTRAVENOUS; SUBCUTANEOUS at 17:25

## 2020-12-05 RX ADMIN — SODIUM CHLORIDE 50 ML: 9 INJECTION, SOLUTION INTRAVENOUS at 12:00

## 2020-12-05 RX ADMIN — ALBUTEROL SULFATE 2 PUFF: 90 AEROSOL, METERED RESPIRATORY (INHALATION) at 09:00

## 2020-12-05 RX ADMIN — INSULIN GLARGINE 19 UNITS: 100 INJECTION, SOLUTION SUBCUTANEOUS at 21:00

## 2020-12-05 RX ADMIN — INSULIN LISPRO 4 UNITS: 100 INJECTION, SOLUTION INTRAVENOUS; SUBCUTANEOUS at 05:36

## 2020-12-05 RX ADMIN — ZINC SULFATE 220 MG (50 MG) CAPSULE 50 MG: CAPSULE at 21:37

## 2020-12-05 RX ADMIN — INSULIN LISPRO 6 UNITS: 100 INJECTION, SOLUTION INTRAVENOUS; SUBCUTANEOUS at 05:38

## 2020-12-05 RX ADMIN — ALBUTEROL SULFATE 2 PUFF: 90 AEROSOL, METERED RESPIRATORY (INHALATION) at 21:37

## 2020-12-05 RX ADMIN — ZINC SULFATE 220 MG (50 MG) CAPSULE 50 MG: CAPSULE at 09:00

## 2020-12-05 RX ADMIN — Medication 10 ML: at 09:00

## 2020-12-05 RX ADMIN — INSULIN LISPRO 4 UNITS: 100 INJECTION, SOLUTION INTRAVENOUS; SUBCUTANEOUS at 12:00

## 2020-12-05 RX ADMIN — ENOXAPARIN SODIUM 40 MG: 40 INJECTION SUBCUTANEOUS at 21:37

## 2020-12-05 RX ADMIN — Medication 1000 MG: at 09:00

## 2020-12-05 RX ADMIN — DEXAMETHASONE 6 MG: 4 TABLET ORAL at 10:51

## 2020-12-05 RX ADMIN — ENOXAPARIN SODIUM 40 MG: 40 INJECTION SUBCUTANEOUS at 09:00

## 2020-12-05 RX ADMIN — VALSARTAN 320 MG: 320 TABLET ORAL at 10:50

## 2020-12-05 RX ADMIN — INSULIN LISPRO 6 UNITS: 100 INJECTION, SOLUTION INTRAVENOUS; SUBCUTANEOUS at 12:00

## 2020-12-05 RX ADMIN — INSULIN LISPRO 4 UNITS: 100 INJECTION, SOLUTION INTRAVENOUS; SUBCUTANEOUS at 21:00

## 2020-12-05 ASSESSMENT — PAIN SCALES - GENERAL: PAINLEVEL_OUTOF10: 0

## 2020-12-05 NOTE — PROGRESS NOTES
Date: 12/4/2020    Time: 11:55 PM    Patient Placed On BIPAP/CPAP/ Non-Invasive Ventilation? No    If no must comment. Facial area red/color change? No           If YES are Blister/Lesion present? No   If yes must notify nursing staff  BIPAP/CPAP skin barrier?   Yes    Skin barrier type:mepilexlite       Comments: Patient was not ready to go on Bipap machine, machine in room if needed      [facility]

## 2020-12-05 NOTE — PLAN OF CARE
Problem: Gas Exchange - Impaired  Goal: Absence of hypoxia  Outcome: Ongoing     Problem: Gas Exchange - Impaired  Goal: Promote optimal lung function  Outcome: Met This Shift     Problem: Breathing Pattern - Ineffective  Goal: Ability to achieve and maintain a regular respiratory rate  Outcome: Met This Shift     Problem: Airway Clearance - Ineffective  Goal: Achieve or maintain patent airway  Outcome: Met This Shift

## 2020-12-05 NOTE — PROGRESS NOTES
[facility] Progress Note    Admitting Date and Time: 11/27/2020  6:21 AM  Admit Dx: Pneumonia due to COVID-19 virus [U07.1, J12.89]  Pneumonia due to COVID-19 virus [U07.1, J12.89]    Subjective:  Patient is being followed for Pneumonia due to COVID-19 virus [U07.1, J12.89]  Pneumonia due to COVID-19 virus [U07.1, J12.89]   Pt no nausea vomiting diarrhea no fever chills, spoke with nurse on the floor still 60 L. Per RN: Discussed oxygen    ROS: denies fever, chills, cp, +sob -little better, n/v, HA unless stated above.  insulin glargine  0.25 Units/kg (Ideal) Subcutaneous Nightly    insulin lispro  0.08 Units/kg (Ideal) Subcutaneous TID WC    insulin lispro  0-12 Units Subcutaneous TID WC    insulin lispro  0-6 Units Subcutaneous Nightly    dexamethasone  6 mg Oral Daily    sodium chloride  1 spray Each Nostril BID    albuterol sulfate HFA  2 puff Inhalation TID    sodium chloride flush  10 mL Intravenous 2 times per day    enoxaparin  40 mg Subcutaneous BID    vitamin C  1,000 mg Oral Daily    zinc sulfate  50 mg Oral BID    valsartan  320 mg Oral Daily    And    hydroCHLOROthiazide  12.5 mg Oral Daily     glucose, 15 g, PRN  dextrose, 12.5 g, PRN  glucagon (rDNA), 1 mg, PRN  acetaminophen, 650 mg, Q6H PRN    Or  acetaminophen, 650 mg, Q6H PRN  sodium chloride flush, 10 mL, PRN  acetaminophen, 650 mg, Q6H PRN    Or  acetaminophen, 650 mg, Q6H PRN  polyethylene glycol, 17 g, Daily PRN  promethazine, 12.5 mg, Q6H PRN    Or  ondansetron, 4 mg, Q6H PRN  sodium chloride, 30 mL, PRN         Objective:    /63   Pulse 69   Temp 98.2 °F (36.8 °C) (Oral)   Resp 20   Ht 5' 10.5\" (1.791 m)   Wt 245 lb (111.1 kg)   SpO2 95%   BMI 34.66 kg/m²   Telephone exam and interview\"\:  General Appearance: alert and oriented to person, place and time and in no acute distress. Sitting in bed.   Skin: warm and dry  Head: normocephalic and atraumatic  Eyes: pupils equal, round, and reactive to light, extraocular eye movements intact, conjunctivae normal  Neck: neck supple and non tender without mass   Pulmonary/Chest: clear to auscultation bilaterally- no wheezes, rales or rhonchi, normal air movement, no respiratory distress   -OptiFlow 60 L  Cardiovascular: normal rate, normal S1 and S2 and no carotid bruits  Abdomen: soft, non-tender, non-distended, normal bowel sounds, no masses or organomegaly  Extremities: no cyanosis, no clubbing and no edema  Neurologic: no cranial nerve deficit and speech normal  Hep-Lock      Recent Labs     12/03/20  0555 12/04/20  0606 12/05/20  0520   * 128* 131*   K 4.4 4.6 4.4   CL 96* 94* 96*   CO2 26 25 27   BUN 25* 26* 28*   CREATININE 1.1 1.1 1.1   GLUCOSE 249* 297* 230*   CALCIUM 8.4* 8.5* 8.3*       Recent Labs     12/03/20  0555 12/04/20 0606 12/05/20  0520   WBC 7.7 8.0 9.0   RBC 4.63 4.63 4.58   HGB 13.9 13.9 13.7   HCT 42.0 41.3 41.3   MCV 90.7 89.2 90.2   MCH 30.0 30.0 29.9   MCHC 33.1 33.7 33.2   RDW 12.3 12.5 12.5    204 221   MPV 10.7 11.0 11.3     11/27/2020  2:08 PM - Anupam, Mhy Incoming Results From [facility]     Specimen Information:  Nasopharyngeal          Component  Value  Ref Range & Units  Status  Collected  Lab    Adenovirus by PCR  Not Detected  Not Detected  Final  11/27/2020  7:19 AM  [facility]    Bordetella parapertussis by PCR  Not Detected  Not Detected  Final  11/27/2020  7:19 AM  [facility]    Bordetella pertussis by PCR  Not Detected  Not Detected  Final  11/27/2020  7:19 AM  [facility]    Chlamydophilia pneumoniae by PCR  Not Detected  Not Detected  Final  11/27/2020  7:19 AM  [facility]    Coronavirus 229E by PCR  Not Detected  Not Detected  Final  11/27/2020  7:19 AM  [facility]    Coronavirus HKU1 by PCR  Not Detected  Not Detected  Final  11/27/2020  7:19 AM   - [address] [place] [address] Lab    Coronavirus diet      NOTE: This report was transcribed using voice recognition software. Every effort was made to ensure accuracy; however, inadvertent computerized transcription errors may be present.   Electronically signed by YESIKA Hansen on 12/5/2020 at 8:03 AM

## 2020-12-05 NOTE — PROGRESS NOTES
Pulmonary Progress Note    Admit Date: 2020  Hospital day                               PCP: Barb Mckenna MD    Chief Complaint (s):  Patient Active Problem List   Diagnosis    Impaired fasting glucose    Osteoarthrosis involving multiple sites    Chronic kidney disease (CKD), stage III (moderate)    Benign essential hypertension    Basal cell carcinoma of skin    Appendicitis    Pneumonia due to COVID-19 virus       Subjective:  · Mr. Edward Cardeans is sleeping soundly with an excellent saturation on 60% on BiPAP. He appears restful      Vitals:  VITALS:  /63   Pulse 69   Temp 98.2 °F (36.8 °C) (Oral)   Resp 20   Ht 5' 10.5\" (1.791 m)   Wt 245 lb (111.1 kg)   SpO2 95%   BMI 34.66 kg/m²     24HR INTAKE/OUTPUT:      Intake/Output Summary (Last 24 hours) at 2020 0824  Last data filed at 2020 0534  Gross per 24 hour   Intake 1392 ml   Output 350 ml   Net 1042 ml       24HR PULSE OXIMETRY RANGE:    SpO2  Av.1 %  Min: 88 %  Max: 95 %    Medications:  IV:   sodium chloride 75 mL/hr at 20 2105       Scheduled Meds:   insulin glargine  0.25 Units/kg (Ideal) Subcutaneous Nightly    insulin lispro  0.08 Units/kg (Ideal) Subcutaneous TID WC    insulin lispro  0-12 Units Subcutaneous TID WC    insulin lispro  0-6 Units Subcutaneous Nightly    dexamethasone  6 mg Oral Daily    sodium chloride  1 spray Each Nostril BID    albuterol sulfate HFA  2 puff Inhalation TID    sodium chloride flush  10 mL Intravenous 2 times per day    enoxaparin  40 mg Subcutaneous BID    vitamin C  1,000 mg Oral Daily    zinc sulfate  50 mg Oral BID    valsartan  320 mg Oral Daily    And    hydroCHLOROthiazide  12.5 mg Oral Daily       Diet:   Dietary Nutrition Supplements: Standard High Calorie Oral Supplement  DIET CARB CONTROL; Carb Control: 4 carbs/meal (approximate 1800 kcals/day)     EXAM:  General: No distress. Sleeping soundly  Eyes: PERRL. No sclera icterus.  No conjunctival injection. ENT: No discharge. Pharynx clear. Neck: Trachea midline. Normal thyroid. Resp: No accessory muscle use. No rales. No wheezing. No rhonchi. CV: Regular rate. Regular rhythm. No murmur or rub. Abd: Non-tender. Non-distended. No masses. No organomegaly. Normal bowel sounds. Skin: Warm and dry. No nodule on exposed extremities. No rash on exposed extremities. Ext: No cyanosis, clubbing, edema  Lymph: No cervical LAD. No supraclavicular LAD. M/S: No cyanosis. No joint deformity. No clubbing. Neuro:  Positive pupils/gag/corneals. Normal pain response. Results:  CBC:   Recent Labs     12/03/20 0555 12/04/20 0606 12/05/20 0520   WBC 7.7 8.0 9.0   HGB 13.9 13.9 13.7   HCT 42.0 41.3 41.3   MCV 90.7 89.2 90.2    204 221     BMP:   Recent Labs     12/03/20  0555 12/04/20 0606 12/05/20 0520   * 128* 131*   K 4.4 4.6 4.4   CL 96* 94* 96*   CO2 26 25 27   BUN 25* 26* 28*   CREATININE 1.1 1.1 1.1     LIVER PROFILE:   Recent Labs     12/03/20 0555 12/04/20 0606 12/05/20 0520   AST 23 23 24   ALT 35 29 30   BILIDIR  --  0.2  --    BILITOT 0.4 0.4 0.4   ALKPHOS 56 61 65     PT/INR:   Recent Labs     12/03/20  1500   PROTIME 12.6*   INR 1.1     APTT:   Recent Labs     12/04/20 0606   APTT 27.0         Pathology:  1. N/A      Microbiology:  1. None    Recent ABG:   No results for input(s): PH, PO2, PCO2, HCO3, BE, O2SAT, METHB, O2HB, COHB, O2CON, HHB, THB in the last 72 hours. FiO2 : 70 %       Recent Films:  XR CHEST PORTABLE   Final Result   Worsening aeration both lungs. XR CHEST PORTABLE   Final Result   Bilateral airspace opacity may represent infectious process in the proper   clinical setting. Assessment:  1. COVID-19 pneumonia: Oxygenation appears to be slowly improving. The patient tolerated BiPAP last night    Plan:  1.  Wean FiO2 and positive pressure ventilation as tolerated    Time at the bedside, reviewing labs and radiographs, reviewing

## 2020-12-06 LAB
ALBUMIN SERPL-MCNC: 2.4 G/DL (ref 3.5–5.2)
ALP BLD-CCNC: 68 U/L (ref 40–129)
ALT SERPL-CCNC: 37 U/L (ref 0–40)
ANION GAP SERPL CALCULATED.3IONS-SCNC: 6 MMOL/L (ref 7–16)
AST SERPL-CCNC: 29 U/L (ref 0–39)
BASOPHILS ABSOLUTE: 0 E9/L (ref 0–0.2)
BASOPHILS RELATIVE PERCENT: 0 % (ref 0–2)
BILIRUB SERPL-MCNC: 0.4 MG/DL (ref 0–1.2)
BUN BLDV-MCNC: 27 MG/DL (ref 8–23)
CALCIUM SERPL-MCNC: 8.2 MG/DL (ref 8.6–10.2)
CHLORIDE BLD-SCNC: 98 MMOL/L (ref 98–107)
CO2: 27 MMOL/L (ref 22–29)
CREAT SERPL-MCNC: 1 MG/DL (ref 0.7–1.2)
EOSINOPHILS ABSOLUTE: 0 E9/L (ref 0.05–0.5)
EOSINOPHILS RELATIVE PERCENT: 0 % (ref 0–6)
GFR AFRICAN AMERICAN: >60
GFR NON-AFRICAN AMERICAN: >60 ML/MIN/1.73
GLUCOSE BLD-MCNC: 265 MG/DL (ref 74–99)
HCT VFR BLD CALC: 42.8 % (ref 37–54)
HEMOGLOBIN: 13.8 G/DL (ref 12.5–16.5)
LYMPHOCYTES ABSOLUTE: 1.09 E9/L (ref 1.5–4)
LYMPHOCYTES RELATIVE PERCENT: 13 % (ref 20–42)
MCH RBC QN AUTO: 29.4 PG (ref 26–35)
MCHC RBC AUTO-ENTMCNC: 32.2 % (ref 32–34.5)
MCV RBC AUTO: 91.3 FL (ref 80–99.9)
METAMYELOCYTES RELATIVE PERCENT: 0.9 % (ref 0–1)
METER GLUCOSE: 196 MG/DL (ref 74–99)
METER GLUCOSE: 225 MG/DL (ref 74–99)
METER GLUCOSE: 269 MG/DL (ref 74–99)
METER GLUCOSE: 344 MG/DL (ref 74–99)
MONOCYTES ABSOLUTE: 0.67 E9/L (ref 0.1–0.95)
MONOCYTES RELATIVE PERCENT: 7.8 % (ref 2–12)
MYELOCYTE PERCENT: 0.9 % (ref 0–0)
NEUTROPHILS ABSOLUTE: 6.64 E9/L (ref 1.8–7.3)
NEUTROPHILS RELATIVE PERCENT: 77.4 % (ref 43–80)
NUCLEATED RED BLOOD CELLS: 0 /100 WBC
PDW BLD-RTO: 12.5 FL (ref 11.5–15)
PLATELET # BLD: 223 E9/L (ref 130–450)
PMV BLD AUTO: 10.5 FL (ref 7–12)
POTASSIUM SERPL-SCNC: 4.6 MMOL/L (ref 3.5–5)
RBC # BLD: 4.69 E12/L (ref 3.8–5.8)
SODIUM BLD-SCNC: 131 MMOL/L (ref 132–146)
TOTAL PROTEIN: 5.2 G/DL (ref 6.4–8.3)
WBC # BLD: 8.4 E9/L (ref 4.5–11.5)

## 2020-12-06 PROCEDURE — 2700000000 HC OXYGEN THERAPY PER DAY

## 2020-12-06 PROCEDURE — 82962 GLUCOSE BLOOD TEST: CPT

## 2020-12-06 PROCEDURE — 2060000000 HC ICU INTERMEDIATE R&B

## 2020-12-06 PROCEDURE — 85025 COMPLETE CBC W/AUTO DIFF WBC: CPT

## 2020-12-06 PROCEDURE — 6360000002 HC RX W HCPCS: Performed by: INTERNAL MEDICINE

## 2020-12-06 PROCEDURE — 99232 SBSQ HOSP IP/OBS MODERATE 35: CPT | Performed by: INTERNAL MEDICINE

## 2020-12-06 PROCEDURE — APPSS30 APP SPLIT SHARED TIME 16-30 MINUTES: Performed by: CLINICAL NURSE SPECIALIST

## 2020-12-06 PROCEDURE — 36415 COLL VENOUS BLD VENIPUNCTURE: CPT

## 2020-12-06 PROCEDURE — 94660 CPAP INITIATION&MGMT: CPT

## 2020-12-06 PROCEDURE — 6370000000 HC RX 637 (ALT 250 FOR IP): Performed by: INTERNAL MEDICINE

## 2020-12-06 PROCEDURE — 2580000003 HC RX 258: Performed by: INTERNAL MEDICINE

## 2020-12-06 PROCEDURE — 80053 COMPREHEN METABOLIC PANEL: CPT

## 2020-12-06 RX ADMIN — Medication 1000 MG: at 08:27

## 2020-12-06 RX ADMIN — INSULIN LISPRO 4 UNITS: 100 INJECTION, SOLUTION INTRAVENOUS; SUBCUTANEOUS at 22:07

## 2020-12-06 RX ADMIN — Medication 10 ML: at 08:28

## 2020-12-06 RX ADMIN — INSULIN LISPRO 6 UNITS: 100 INJECTION, SOLUTION INTRAVENOUS; SUBCUTANEOUS at 16:50

## 2020-12-06 RX ADMIN — ENOXAPARIN SODIUM 40 MG: 40 INJECTION SUBCUTANEOUS at 08:27

## 2020-12-06 RX ADMIN — ZINC SULFATE 220 MG (50 MG) CAPSULE 50 MG: CAPSULE at 22:00

## 2020-12-06 RX ADMIN — ALBUTEROL SULFATE 2 PUFF: 90 AEROSOL, METERED RESPIRATORY (INHALATION) at 08:30

## 2020-12-06 RX ADMIN — VALSARTAN 320 MG: 320 TABLET ORAL at 08:27

## 2020-12-06 RX ADMIN — INSULIN LISPRO 2 UNITS: 100 INJECTION, SOLUTION INTRAVENOUS; SUBCUTANEOUS at 11:50

## 2020-12-06 RX ADMIN — ZINC SULFATE 220 MG (50 MG) CAPSULE 50 MG: CAPSULE at 08:28

## 2020-12-06 RX ADMIN — INSULIN LISPRO 6 UNITS: 100 INJECTION, SOLUTION INTRAVENOUS; SUBCUTANEOUS at 07:50

## 2020-12-06 RX ADMIN — DEXAMETHASONE 6 MG: 4 TABLET ORAL at 08:27

## 2020-12-06 RX ADMIN — ALBUTEROL SULFATE 2 PUFF: 90 AEROSOL, METERED RESPIRATORY (INHALATION) at 21:58

## 2020-12-06 RX ADMIN — INSULIN GLARGINE 19 UNITS: 100 INJECTION, SOLUTION SUBCUTANEOUS at 22:07

## 2020-12-06 RX ADMIN — INSULIN LISPRO 6 UNITS: 100 INJECTION, SOLUTION INTRAVENOUS; SUBCUTANEOUS at 11:50

## 2020-12-06 RX ADMIN — ALBUTEROL SULFATE 2 PUFF: 90 AEROSOL, METERED RESPIRATORY (INHALATION) at 15:30

## 2020-12-06 RX ADMIN — ENOXAPARIN SODIUM 40 MG: 40 INJECTION SUBCUTANEOUS at 21:57

## 2020-12-06 RX ADMIN — INSULIN LISPRO 4 UNITS: 100 INJECTION, SOLUTION INTRAVENOUS; SUBCUTANEOUS at 07:50

## 2020-12-06 RX ADMIN — HYDROCHLOROTHIAZIDE 12.5 MG: 12.5 TABLET ORAL at 08:27

## 2020-12-06 ASSESSMENT — PAIN SCALES - GENERAL: PAINLEVEL_OUTOF10: 0

## 2020-12-06 NOTE — PROGRESS NOTES
General Appearance: alert and oriented to person, place and time and in no acute distress. Sitting in bed. Resting on his left side. Maintains conversation well.   Skin: warm and dry  Head: normocephalic and atraumatic  Eyes: pupils equal, round, and reactive to light, extraocular eye movements intact, conjunctivae normal  Neck: neck supple and non tender without mass   Pulmonary/Chest: clear to auscultation bilaterally- no wheezes, rales or rhonchi, normal air movement, no respiratory distress   -OptiFlow 60 L, 70% oxygen  Cardiovascular: normal rate, normal S1 and S2 and no carotid bruits  Abdomen: soft, non-tender, non-distended, normal bowel sounds, no masses or organomegaly  Extremities: no cyanosis, no clubbing and 1+ edema  Neurologic: no cranial nerve deficit and speech normal  Hep-Lock      Recent Labs     12/04/20  0606 12/05/20  0520 12/06/20  0420   * 131* 131*   K 4.6 4.4 4.6   CL 94* 96* 98   CO2 25 27 27   BUN 26* 28* 27*   CREATININE 1.1 1.1 1.0   GLUCOSE 297* 230* 265*   CALCIUM 8.5* 8.3* 8.2*       Recent Labs     12/04/20  0606 12/05/20  0520 12/06/20  0420   WBC 8.0 9.0 8.4   RBC 4.63 4.58 4.69   HGB 13.9 13.7 13.8   HCT 41.3 41.3 42.8   MCV 89.2 90.2 91.3   MCH 30.0 29.9 29.4   MCHC 33.7 33.2 32.2   RDW 12.5 12.5 12.5    221 223   MPV 11.0 11.3 10.5     11/27/2020  2:08 PM - Anupam, Mhy Incoming Results From [facility]     Specimen Information:  Nasopharyngeal          Component  Value  Ref Range & Units  Status  Collected  Lab    Adenovirus by PCR  Not Detected  Not Detected  Final  11/27/2020  7:19 AM  [facility]    Bordetella parapertussis by PCR  Not Detected  Not Detected  Final  11/27/2020  7:19 AM  [facility]    Bordetella pertussis by PCR  Not Detected  Not Detected  Final  11/27/2020  7:19 AM  [facility]    Chlamydophilia pneumoniae by PCR  Not Detected  Not Detected  Final  11/27/2020  7:19 AM  [facility]    Coronavirus 229E by PCR  Not Detected  Not Detected  Final  11/27/2020  7:19 AM  [facility]    Coronavirus HKU1 by PCR  Not Detected  Not Detected  Final  11/27/2020  7:19 AM  [facility]    Coronavirus NL63 by PCR  Not Detected  Not Detected  Final  11/27/2020  7:19 AM  [facility]    Coronavirus OC43 by PCR  Not Detected  Not Detected  Final  11/27/2020  7:19 AM  [facility]    SARS-CoV-2, PCR  DETECTEDAbnormal    Not Detected  Final  11/27/2020  7:19 AM  [facility]              Labs 11/28/2020    Fibrinogen 648  D-dimer 764      Radiology:   CXR 12 1 2  FINDINGS:    The cardiomediastinal contours are unchanged.  There are peripheral confluent    airspace opacities within the bilateral upper and bilateral lower lobes,    progressed compared to the prior examination.  There is no visible pleural    effusion or pneumothorax.  The pulmonary vessels are within normal limits. There is stable nonspecific elevation of the right hemidiaphragm.         Impression    Worsening aeration both lungs. Assessment:    Active Problems:    Pneumonia due to COVID-19 virus  Resolved Problems:    * No resolved hospital problems. *      Plan:  1. Acute hypoxic respiratory failure secondary to COVID-19 pneumonia  -Decompensated 86% on room air in the triage area. -COVID-19 positive    -Patient still on OptiFlow 60+ liters, 70 L.    -Continue Decadron -day 10 right now  -Pharmacy to dose remdesivir -completed 12/1/2020  -Continue vitamin C, zinc  -Pulmonary consult -notes appreciated.   -Continue albuterol  -Continue OptiFlow, NIPPV at night  -Okay out of bed to chair      Chronic kidney disease stage III  -Monitor labs      Hypertension  -Monitor vitals  -Continue valsartan and hydrochlorothiazide      Diabetes type 2-impaired fasting glucose  -Continue with sliding scale, Lantus  -Hypoglycemia improved      Hyponatremia -131- 128 today  -Monitoring labs    -  gentle hydration 0.9% ns at 50 ML---bun liitle up 26, crt 1.1  -    Daily labs, repeat Covid associated labs 12/4/2020 noted - repeat 12/7    CODE STATUS full  DVT prophylaxis on Lovenox  Diet carb controlled diet      NOTE: This report was transcribed using voice recognition software. Every effort was made to ensure accuracy; however, inadvertent computerized transcription errors may be present.   Electronically signed by YESIKA Rider on 12/6/2020 at 8:22 AM

## 2020-12-06 NOTE — PROGRESS NOTES
Date: 12/5/2020    Time: 10:42 PM    Patient Placed On BIPAP/CPAP/ Non-Invasive Ventilation? No    If no must comment. Facial area red/color change? No           If YES are Blister/Lesion present? No   If yes must notify nursing staff  BIPAP/CPAP skin barrier? No    Skin barrier type:n/a       Comments: Patient is not ready to go on Bipap machine yet, he is comfortable self proning and his SpO2 is 94%. He has a new face mask to try in the room if needed.         Anel Chol

## 2020-12-07 LAB
ALBUMIN SERPL-MCNC: 2.3 G/DL (ref 3.5–5.2)
ALP BLD-CCNC: 67 U/L (ref 40–129)
ALT SERPL-CCNC: 42 U/L (ref 0–40)
ANION GAP SERPL CALCULATED.3IONS-SCNC: 7 MMOL/L (ref 7–16)
APTT: 25.8 SEC (ref 24.5–35.1)
AST SERPL-CCNC: 34 U/L (ref 0–39)
BASOPHILS ABSOLUTE: 0.04 E9/L (ref 0–0.2)
BASOPHILS RELATIVE PERCENT: 0.4 % (ref 0–2)
BILIRUB SERPL-MCNC: 0.4 MG/DL (ref 0–1.2)
BUN BLDV-MCNC: 28 MG/DL (ref 8–23)
C-REACTIVE PROTEIN: 0.4 MG/DL (ref 0–0.4)
CALCIUM SERPL-MCNC: 8.1 MG/DL (ref 8.6–10.2)
CHLORIDE BLD-SCNC: 97 MMOL/L (ref 98–107)
CO2: 25 MMOL/L (ref 22–29)
CREAT SERPL-MCNC: 1.1 MG/DL (ref 0.7–1.2)
D DIMER: 776 NG/ML DDU
EOSINOPHILS ABSOLUTE: 0.01 E9/L (ref 0.05–0.5)
EOSINOPHILS RELATIVE PERCENT: 0.1 % (ref 0–6)
FIBRINOGEN: 444 MG/DL (ref 225–540)
GFR AFRICAN AMERICAN: >60
GFR NON-AFRICAN AMERICAN: >60 ML/MIN/1.73
GLUCOSE BLD-MCNC: 254 MG/DL (ref 74–99)
HCT VFR BLD CALC: 40.6 % (ref 37–54)
HEMOGLOBIN: 13.6 G/DL (ref 12.5–16.5)
IMMATURE GRANULOCYTES #: 0.45 E9/L
IMMATURE GRANULOCYTES %: 4.9 % (ref 0–5)
INR BLD: 1
LACTATE DEHYDROGENASE: 214 U/L (ref 135–225)
LYMPHOCYTES ABSOLUTE: 1.41 E9/L (ref 1.5–4)
LYMPHOCYTES RELATIVE PERCENT: 15.5 % (ref 20–42)
MCH RBC QN AUTO: 30.8 PG (ref 26–35)
MCHC RBC AUTO-ENTMCNC: 33.5 % (ref 32–34.5)
MCV RBC AUTO: 91.9 FL (ref 80–99.9)
METER GLUCOSE: 182 MG/DL (ref 74–99)
METER GLUCOSE: 213 MG/DL (ref 74–99)
METER GLUCOSE: 218 MG/DL (ref 74–99)
METER GLUCOSE: 385 MG/DL (ref 74–99)
MONOCYTES ABSOLUTE: 0.77 E9/L (ref 0.1–0.95)
MONOCYTES RELATIVE PERCENT: 8.5 % (ref 2–12)
NEUTROPHILS ABSOLUTE: 6.42 E9/L (ref 1.8–7.3)
NEUTROPHILS RELATIVE PERCENT: 70.6 % (ref 43–80)
PDW BLD-RTO: 12.6 FL (ref 11.5–15)
PLATELET # BLD: 200 E9/L (ref 130–450)
PMV BLD AUTO: 10.6 FL (ref 7–12)
POTASSIUM SERPL-SCNC: 4.5 MMOL/L (ref 3.5–5)
PROTHROMBIN TIME: 11.2 SEC (ref 9.3–12.4)
RBC # BLD: 4.42 E12/L (ref 3.8–5.8)
SODIUM BLD-SCNC: 129 MMOL/L (ref 132–146)
TOTAL PROTEIN: 5.1 G/DL (ref 6.4–8.3)
WBC # BLD: 9.1 E9/L (ref 4.5–11.5)

## 2020-12-07 PROCEDURE — 36415 COLL VENOUS BLD VENIPUNCTURE: CPT

## 2020-12-07 PROCEDURE — 83520 IMMUNOASSAY QUANT NOS NONAB: CPT

## 2020-12-07 PROCEDURE — 85730 THROMBOPLASTIN TIME PARTIAL: CPT

## 2020-12-07 PROCEDURE — 80053 COMPREHEN METABOLIC PANEL: CPT

## 2020-12-07 PROCEDURE — 2700000000 HC OXYGEN THERAPY PER DAY

## 2020-12-07 PROCEDURE — 85610 PROTHROMBIN TIME: CPT

## 2020-12-07 PROCEDURE — 2060000000 HC ICU INTERMEDIATE R&B

## 2020-12-07 PROCEDURE — 85025 COMPLETE CBC W/AUTO DIFF WBC: CPT

## 2020-12-07 PROCEDURE — 86140 C-REACTIVE PROTEIN: CPT

## 2020-12-07 PROCEDURE — 6370000000 HC RX 637 (ALT 250 FOR IP): Performed by: INTERNAL MEDICINE

## 2020-12-07 PROCEDURE — 83615 LACTATE (LD) (LDH) ENZYME: CPT

## 2020-12-07 PROCEDURE — 2580000003 HC RX 258: Performed by: INTERNAL MEDICINE

## 2020-12-07 PROCEDURE — 99232 SBSQ HOSP IP/OBS MODERATE 35: CPT | Performed by: INTERNAL MEDICINE

## 2020-12-07 PROCEDURE — APPSS30 APP SPLIT SHARED TIME 16-30 MINUTES: Performed by: NURSE PRACTITIONER

## 2020-12-07 PROCEDURE — 82962 GLUCOSE BLOOD TEST: CPT

## 2020-12-07 PROCEDURE — 85378 FIBRIN DEGRADE SEMIQUANT: CPT

## 2020-12-07 PROCEDURE — 85384 FIBRINOGEN ACTIVITY: CPT

## 2020-12-07 PROCEDURE — 6360000002 HC RX W HCPCS: Performed by: INTERNAL MEDICINE

## 2020-12-07 PROCEDURE — 94660 CPAP INITIATION&MGMT: CPT

## 2020-12-07 RX ADMIN — ALBUTEROL SULFATE 2 PUFF: 90 AEROSOL, METERED RESPIRATORY (INHALATION) at 11:22

## 2020-12-07 RX ADMIN — INSULIN LISPRO 4 UNITS: 100 INJECTION, SOLUTION INTRAVENOUS; SUBCUTANEOUS at 11:27

## 2020-12-07 RX ADMIN — ZINC SULFATE 220 MG (50 MG) CAPSULE 50 MG: CAPSULE at 21:07

## 2020-12-07 RX ADMIN — Medication 1000 MG: at 11:20

## 2020-12-07 RX ADMIN — HYDROCHLOROTHIAZIDE 12.5 MG: 12.5 TABLET ORAL at 11:20

## 2020-12-07 RX ADMIN — INSULIN LISPRO 4 UNITS: 100 INJECTION, SOLUTION INTRAVENOUS; SUBCUTANEOUS at 06:30

## 2020-12-07 RX ADMIN — ENOXAPARIN SODIUM 40 MG: 40 INJECTION SUBCUTANEOUS at 21:07

## 2020-12-07 RX ADMIN — INSULIN LISPRO 5 UNITS: 100 INJECTION, SOLUTION INTRAVENOUS; SUBCUTANEOUS at 21:10

## 2020-12-07 RX ADMIN — ALBUTEROL SULFATE 2 PUFF: 90 AEROSOL, METERED RESPIRATORY (INHALATION) at 21:10

## 2020-12-07 RX ADMIN — INSULIN LISPRO 6 UNITS: 100 INJECTION, SOLUTION INTRAVENOUS; SUBCUTANEOUS at 18:07

## 2020-12-07 RX ADMIN — INSULIN LISPRO 6 UNITS: 100 INJECTION, SOLUTION INTRAVENOUS; SUBCUTANEOUS at 11:20

## 2020-12-07 RX ADMIN — VALSARTAN 320 MG: 320 TABLET ORAL at 11:21

## 2020-12-07 RX ADMIN — DEXAMETHASONE 6 MG: 4 TABLET ORAL at 11:21

## 2020-12-07 RX ADMIN — ENOXAPARIN SODIUM 40 MG: 40 INJECTION SUBCUTANEOUS at 11:20

## 2020-12-07 RX ADMIN — ZINC SULFATE 220 MG (50 MG) CAPSULE 50 MG: CAPSULE at 11:21

## 2020-12-07 RX ADMIN — INSULIN GLARGINE 19 UNITS: 100 INJECTION, SOLUTION SUBCUTANEOUS at 21:10

## 2020-12-07 RX ADMIN — Medication 10 ML: at 11:21

## 2020-12-07 RX ADMIN — ALBUTEROL SULFATE 2 PUFF: 90 AEROSOL, METERED RESPIRATORY (INHALATION) at 15:10

## 2020-12-07 RX ADMIN — Medication 10 ML: at 21:08

## 2020-12-07 RX ADMIN — INSULIN LISPRO 6 UNITS: 100 INJECTION, SOLUTION INTRAVENOUS; SUBCUTANEOUS at 08:30

## 2020-12-07 RX ADMIN — INSULIN LISPRO 4 UNITS: 100 INJECTION, SOLUTION INTRAVENOUS; SUBCUTANEOUS at 18:06

## 2020-12-07 ASSESSMENT — PAIN SCALES - GENERAL: PAINLEVEL_OUTOF10: 0

## 2020-12-07 NOTE — PROGRESS NOTES
Date: 12/6/2020    Time: 11:32 PM    Patient Placed On BIPAP/CPAP/ Non-Invasive Ventilation? No    If no must comment. Facial area red/color change? No           If YES are Blister/Lesion present?no If yes must notify nursing staff  BIPAP/CPAP skin barrier?   No    Skin barrier type:n/a       Comments: Patient not ready to go on bipap machine, machine in room if needed        [employer]

## 2020-12-07 NOTE — PROGRESS NOTES
Comprehensive Nutrition Assessment    Type and Reason for Visit:  Reassess    Nutrition Recommendations/Plan: Continue Diet. Will Continue Ensure High Matheus ONS BID. Nutrition Assessment:  Pt improving from a nutritional stand-point AEB pt consuming ~75% of most meals and supplements w/ no noted complaints at this time. Pt remains at risk however d/t poor courtney/intake pta 2/2 COVID19+. Will continue ONS to aid in PO intake. Malnutrition Assessment:  Malnutrition Status:  Insufficient data    Context:  Acute Illness     Findings of the 6 clinical characteristics of malnutrition:  Energy Intake:  1 - 75% or less of estimated energy requirements for 7 or more days(per pt, pta)  Weight Loss:  Unable to assess(2/2 poor EMR wt hx both since adm as well as pta)     Body Fat Loss:  Unable to assess     Muscle Mass Loss:  Unable to assess    Fluid Accumulation:  Unable to assess(2/2 CKD w/ suspected fluid overload)     Strength:  Not Performed    Estimated Daily Nutrient Needs:  Energy (kcal):  2889-5027(MSJ x 1.2 SF per CBW); Weight Used for Energy Requirements:  Current     Protein (g):  (1.2-1.4 gm/kg per IBW as tolerated w/ CKD);  Weight Used for Protein Requirements:  Ideal        Fluid (ml/day):  5819-7833; Method Used for Fluid Requirements:  1 ml/kcal      Nutrition Related Findings:  A&O, poor dentition, Abd/BS WDL, No Edema, -I/O's      Wounds:  None       Current Nutrition Therapies:    Dietary Nutrition Supplements: Standard High Calorie Oral Supplement  DIET CARB CONTROL; Carb Control: 4 carbs/meal (approximate 1800 kcals/day)    Anthropometric Measures:  · Height: 5' 10.5\" (179.1 cm)  · Current Body Weight: 246 lb (111.6 kg)(bed 12/7)   · Admission Body Weight: 245 lb (111.1 kg)(stated 11/27)    · Usual Body Weight: (UTO UBW 2/2 poor EMR wt hx w/ no actual wt's pta)     · Ideal Body Weight: 169 lbs; % Ideal Body Weight 145 %   · BMI: 34.8  · Adjusted Body Weight:  ; No Adjustment

## 2020-12-07 NOTE — PROGRESS NOTES
[facility]   Progress Note    Admitting Date and Time: 11/27/2020  6:21 AM  Admit Dx: Pneumonia due to COVID-19 virus [U07.1, J12.89]  Pneumonia due to COVID-19 virus [U07.1, J12.89]    Subjective:    Patient was admitted with Pneumonia due to COVID-19 virus [U07.1, J12.89]  Pneumonia due to COVID-19 virus [U07.1, J12.89]. Patient sitting up on the side of bed. Patient states that he is feeling great today. Denies any complaints discomfort. ROS: denies fever, chills, cp, sob, n/v, HA unless stated above.      insulin glargine  0.25 Units/kg (Ideal) Subcutaneous Nightly    insulin lispro  0.08 Units/kg (Ideal) Subcutaneous TID WC    insulin lispro  0-12 Units Subcutaneous TID WC    insulin lispro  0-6 Units Subcutaneous Nightly    dexamethasone  6 mg Oral Daily    sodium chloride  1 spray Each Nostril BID    albuterol sulfate HFA  2 puff Inhalation TID    sodium chloride flush  10 mL Intravenous 2 times per day    enoxaparin  40 mg Subcutaneous BID    vitamin C  1,000 mg Oral Daily    zinc sulfate  50 mg Oral BID    valsartan  320 mg Oral Daily    And    hydroCHLOROthiazide  12.5 mg Oral Daily     glucose, 15 g, PRN  dextrose, 12.5 g, PRN  glucagon (rDNA), 1 mg, PRN  acetaminophen, 650 mg, Q6H PRN    Or  acetaminophen, 650 mg, Q6H PRN  sodium chloride flush, 10 mL, PRN  acetaminophen, 650 mg, Q6H PRN    Or  acetaminophen, 650 mg, Q6H PRN  polyethylene glycol, 17 g, Daily PRN  promethazine, 12.5 mg, Q6H PRN    Or  ondansetron, 4 mg, Q6H PRN  sodium chloride, 30 mL, PRN         Objective:    /75   Pulse 56   Temp 97.9 °F (36.6 °C) (Oral)   Resp 22   Ht 5' 10.5\" (1.791 m)   Wt 246 lb 1.6 oz (111.6 kg)   SpO2 92%   BMI 34.81 kg/m²   General Appearance: alert and oriented to person, place and time, well-developed and well-nourished, in no acute distress  Skin: warm and dry  Head: normocephalic and atraumatic  Eyes: pupils equal, round, and reactive to light and blood glucose     5. Hypertension - continue home dose Diovan     6.  Acute hyponatremia - IV fluids - no confusion or seizures noted - if no improvement may need consult nephrology         Electronically signed by YESIKA Curiel CNP on 12/7/2020 at 10:19 AM

## 2020-12-07 NOTE — PROGRESS NOTES
Date: 12/7/2020    Time: 12:00 AM    Patient Placed On BIPAP/CPAP/ Non-Invasive Ventilation? Yes    If no must comment. Facial area red/color change? No           If YES are Blister/Lesion present? No   If yes must notify nursing staff  BIPAP/CPAP skin barrier? Yes    Skin barrier type:mepilexlite       Comments: Pt placed on BiPAP for the night. Pt stated that he didn't like the total face mask, so switched back to the full. Mepilex applied.       Cosme Wisdom      12/06/20 9027   NIV Type   Skin Protection for O2 Device Yes   Orientation Middle   Location Nose   Mode Bilevel   Mask Type Total face   Mask Size Medium   Settings/Measurements   IPAP 12 cmH20   CPAP/EPAP 6 cmH2O   Rate Ordered 12   Resp 25   FiO2  70 %   Vt Exhaled 703 mL   Minute Volume 17.1 Liters   Mask Leak (lpm) 27 lpm   Comfort Level Good   Using Accessory Muscles No   SpO2 93

## 2020-12-07 NOTE — PROGRESS NOTES
Pulmonary Progress Note    Admit Date: 2020  Hospital day                               PCP: Shakira Serrano MD    Chief Complaint (s):  Patient Active Problem List   Diagnosis    Impaired fasting glucose    Osteoarthrosis involving multiple sites    Chronic kidney disease (CKD), stage III (moderate)    Benign essential hypertension    Basal cell carcinoma of skin    Appendicitis    Pneumonia due to COVID-19 virus       Subjective:  · Mr. Ariana Santizo is awake and alert sitting up this p.m. He is in good spirits. He continues to require 6 L per nasal cannula. Vitals:  VITALS:  BP (!) 146/78   Pulse 64   Temp 98.4 °F (36.9 °C) (Oral)   Resp 20   Ht 5' 10.5\" (1.791 m)   Wt 245 lb (111.1 kg)   SpO2 90%   BMI 34.66 kg/m²     24HR INTAKE/OUTPUT:      Intake/Output Summary (Last 24 hours) at 2020 1901  Last data filed at 2020 1558  Gross per 24 hour   Intake 2293 ml   Output 650 ml   Net 1643 ml       24HR PULSE OXIMETRY RANGE:    SpO2  Av.2 %  Min: 90 %  Max: 91 %    Medications:  IV:   sodium chloride 50 mL (20 1200)       Scheduled Meds:   insulin glargine  0.25 Units/kg (Ideal) Subcutaneous Nightly    insulin lispro  0.08 Units/kg (Ideal) Subcutaneous TID WC    insulin lispro  0-12 Units Subcutaneous TID WC    insulin lispro  0-6 Units Subcutaneous Nightly    dexamethasone  6 mg Oral Daily    sodium chloride  1 spray Each Nostril BID    albuterol sulfate HFA  2 puff Inhalation TID    sodium chloride flush  10 mL Intravenous 2 times per day    enoxaparin  40 mg Subcutaneous BID    vitamin C  1,000 mg Oral Daily    zinc sulfate  50 mg Oral BID    valsartan  320 mg Oral Daily    And    hydroCHLOROthiazide  12.5 mg Oral Daily       Diet:   Dietary Nutrition Supplements: Standard High Calorie Oral Supplement  DIET CARB CONTROL; Carb Control: 4 carbs/meal (approximate 1800 kcals/day)     EXAM:  General: No distress.   Sleeping soundly  Eyes: PERRL. No sclera icterus. No conjunctival injection. ENT: No discharge. Pharynx clear. Neck: Trachea midline. Normal thyroid. Resp: No accessory muscle use. No rales. No wheezing. No rhonchi. CV: Regular rate. Regular rhythm. No murmur or rub. Abd: Non-tender. Non-distended. No masses. No organomegaly. Normal bowel sounds. Skin: Warm and dry. No nodule on exposed extremities. No rash on exposed extremities. Ext: No cyanosis, clubbing, edema  Lymph: No cervical LAD. No supraclavicular LAD. M/S: No cyanosis. No joint deformity. No clubbing. Neuro:  Positive pupils/gag/corneals. Normal pain response. Results:  CBC:   Recent Labs     12/04/20 0606 12/05/20 0520 12/06/20 0420   WBC 8.0 9.0 8.4   HGB 13.9 13.7 13.8   HCT 41.3 41.3 42.8   MCV 89.2 90.2 91.3    221 223     BMP:   Recent Labs     12/04/20 0606 12/05/20 0520 12/06/20  0420   * 131* 131*   K 4.6 4.4 4.6   CL 94* 96* 98   CO2 25 27 27   BUN 26* 28* 27*   CREATININE 1.1 1.1 1.0     LIVER PROFILE:   Recent Labs     12/04/20 0606 12/05/20 0520 12/06/20  0420   AST 23 24 29   ALT 29 30 37   BILIDIR 0.2  --   --    BILITOT 0.4 0.4 0.4   ALKPHOS 61 65 68     PT/INR:   No results for input(s): PROTIME, INR in the last 72 hours. APTT:   Recent Labs     12/04/20 0606   APTT 27.0         Pathology:  1. N/A      Microbiology:  1. None    Recent ABG:   No results for input(s): PH, PO2, PCO2, HCO3, BE, O2SAT, METHB, O2HB, COHB, O2CON, HHB, THB in the last 72 hours. FiO2 : 70 %       Recent Films:  XR CHEST PORTABLE   Final Result   Worsening aeration both lungs. XR CHEST PORTABLE   Final Result   Bilateral airspace opacity may represent infectious process in the proper   clinical setting. Assessment:  1. COVID-19 pneumonia: Oxygenation appears to be slowly improving. The patient tolerated BiPAP last night    Plan:  1.  Wean FiO2 as tolerated    Time at the bedside, reviewing labs and radiographs, reviewing updated notes and consultations, discussing with staff and family was more than 35 minutes. Please note that voice recognition technology was used in the preparation of this note and make therefore it may contain inadvertent transcription errors. If the patient is a COVID 19 isolation patient, the above physical exam reflects that of the examining physician for the day. Simón Salgado M.D., F.C.C.P.     [facility], [address] [place] [address], [address] [place] [address]

## 2020-12-07 NOTE — PROGRESS NOTES
[facility] and [facility]  [address], [address]  CH[facility]STOPHER [facility] - BEHAVIORAL HEALTH SERVICES, 17 [facility]      Pulmonary Progress Note      SUBJECTIVE:  Still on optiflow 61 li 70%, uses BIPAP at night and tolerates, looking similar with respiratory function with mod cough/sputum production, claims tries to do prone positioning as best he can, claims ambulating a bit inside the room though limited due to wires/equipment.      OBJECTIVE    Medications    Continuous Infusions:   sodium chloride 50 mL (20 1200)       Scheduled Meds:   insulin glargine  0.25 Units/kg (Ideal) Subcutaneous Nightly    insulin lispro  0.08 Units/kg (Ideal) Subcutaneous TID WC    insulin lispro  0-12 Units Subcutaneous TID WC    insulin lispro  0-6 Units Subcutaneous Nightly    dexamethasone  6 mg Oral Daily    sodium chloride  1 spray Each Nostril BID    albuterol sulfate HFA  2 puff Inhalation TID    sodium chloride flush  10 mL Intravenous 2 times per day    enoxaparin  40 mg Subcutaneous BID    vitamin C  1,000 mg Oral Daily    zinc sulfate  50 mg Oral BID    valsartan  320 mg Oral Daily    And    hydroCHLOROthiazide  12.5 mg Oral Daily       PRN Meds:glucose, dextrose, glucagon (rDNA), acetaminophen **OR** acetaminophen, sodium chloride flush, acetaminophen **OR** acetaminophen, polyethylene glycol, promethazine **OR** ondansetron, sodium chloride    Physical    VITALS:  /75   Pulse 56   Temp 97.9 °F (36.6 °C) (Oral)   Resp 22   Ht 5' 10.5\" (1.791 m)   Wt 246 lb 1.6 oz (111.6 kg)   SpO2 92%   BMI 34.81 kg/m²     24HR INTAKE/OUTPUT:      Intake/Output Summary (Last 24 hours) at 2020 0955  Last data filed at 2020 9210  Gross per 24 hour   Intake 2293 ml   Output 850 ml   Net 1443 ml       24HR PULSE OXIMETRY RANGE:    SpO2  Av.8 %  Min: 90 %  Max: 92 %    General appearance: alert, appears stated age and cooperative  Lungs: rhonchi bilaterally with cough  Heart: regular rate and rhythm, S1, S2 normal, no murmur, click, rub or gallop  Abdomen: soft, non-tender; bowel sounds normal; no masses,  no organomegaly  Extremities: extremities normal, atraumatic, no cyanosis or edema  Neurologic: Mental status: Alert, oriented, thought content appropriate    Data    CBC:   Recent Labs     12/05/20 0520 12/06/20 0420 12/07/20 0438   WBC 9.0 8.4 9.1   HGB 13.7 13.8 13.6   HCT 41.3 42.8 40.6   MCV 90.2 91.3 91.9    223 200       BMP:  Recent Labs     12/05/20 0520 12/06/20 0420 12/07/20 0438   * 131* 129*   K 4.4 4.6 4.5   CL 96* 98 97*   CO2 27 27 25   BUN 28* 27* 28*   CREATININE 1.1 1.0 1.1    ALB:3,BILIDIR:3,BILITOT:3,ALKPHOS:3)@    PT/INR:   Recent Labs     12/07/20 0438   PROTIME 11.2   INR 1.0       ABG:   No results for input(s): PH, PO2, PCO2, HCO3, BE, O2SAT, METHB, O2HB, COHB, O2CON, HHB, THB in the last 72 hours. FiO2 : 70 %       Radiology/Other tests reviewed: CXR reviewed with slight increased GG opacities bilateral    Assessment:     Active Problems:    Pneumonia due to COVID-19 virus  Resolved Problems:    * No resolved hospital problems. *  hypoxia  DILLAN    Plan:       1. Cont with decadron  2. Cont with albuterol mdi  3. Cont with optiflow daytime and taper as tolerated, NIPPV at night, prone positioning if able to  4. Nasal saline bid and flutter device, see if helps with cough and sputum production  5. OOB to chair, ambulate with assistance  6. May need to consider acute rehab for increased oxygen requirements and PT/OT      Time at the bedside, reviewing labs and radiographs, reviewing notes and consultations, discussing with staff and family was more than 35 minutes. Thanks for letting us see this patient in consultation. Please contact us with any questions. Office [phone] or after hours through [email or web address] x [phone].

## 2020-12-08 LAB
ALBUMIN SERPL-MCNC: 2.3 G/DL (ref 3.5–5.2)
ALP BLD-CCNC: 67 U/L (ref 40–129)
ALT SERPL-CCNC: 59 U/L (ref 0–40)
ANION GAP SERPL CALCULATED.3IONS-SCNC: 6 MMOL/L (ref 7–16)
AST SERPL-CCNC: 35 U/L (ref 0–39)
BILIRUB SERPL-MCNC: 0.4 MG/DL (ref 0–1.2)
BUN BLDV-MCNC: 25 MG/DL (ref 8–23)
CALCIUM SERPL-MCNC: 8.2 MG/DL (ref 8.6–10.2)
CHLORIDE BLD-SCNC: 101 MMOL/L (ref 98–107)
CO2: 26 MMOL/L (ref 22–29)
CREAT SERPL-MCNC: 1.1 MG/DL (ref 0.7–1.2)
FERRITIN: 610 NG/ML
GFR AFRICAN AMERICAN: >60
GFR NON-AFRICAN AMERICAN: >60 ML/MIN/1.73
GLUCOSE BLD-MCNC: 242 MG/DL (ref 74–99)
HCT VFR BLD CALC: 41.7 % (ref 37–54)
HEMOGLOBIN: 13.6 G/DL (ref 12.5–16.5)
MCH RBC QN AUTO: 29.8 PG (ref 26–35)
MCHC RBC AUTO-ENTMCNC: 32.6 % (ref 32–34.5)
MCV RBC AUTO: 91.4 FL (ref 80–99.9)
METER GLUCOSE: 139 MG/DL (ref 74–99)
METER GLUCOSE: 178 MG/DL (ref 74–99)
METER GLUCOSE: 221 MG/DL (ref 74–99)
METER GLUCOSE: 334 MG/DL (ref 74–99)
PDW BLD-RTO: 12.6 FL (ref 11.5–15)
PLATELET # BLD: 201 E9/L (ref 130–450)
PMV BLD AUTO: 10.8 FL (ref 7–12)
POTASSIUM SERPL-SCNC: 4.5 MMOL/L (ref 3.5–5)
RBC # BLD: 4.56 E12/L (ref 3.8–5.8)
SODIUM BLD-SCNC: 133 MMOL/L (ref 132–146)
TOTAL PROTEIN: 5 G/DL (ref 6.4–8.3)
WBC # BLD: 9.4 E9/L (ref 4.5–11.5)

## 2020-12-08 PROCEDURE — 82962 GLUCOSE BLOOD TEST: CPT

## 2020-12-08 PROCEDURE — APPSS30 APP SPLIT SHARED TIME 16-30 MINUTES: Performed by: NURSE PRACTITIONER

## 2020-12-08 PROCEDURE — 80053 COMPREHEN METABOLIC PANEL: CPT

## 2020-12-08 PROCEDURE — 2580000003 HC RX 258: Performed by: INTERNAL MEDICINE

## 2020-12-08 PROCEDURE — 99232 SBSQ HOSP IP/OBS MODERATE 35: CPT | Performed by: INTERNAL MEDICINE

## 2020-12-08 PROCEDURE — 6360000002 HC RX W HCPCS: Performed by: INTERNAL MEDICINE

## 2020-12-08 PROCEDURE — 2700000000 HC OXYGEN THERAPY PER DAY

## 2020-12-08 PROCEDURE — 2060000000 HC ICU INTERMEDIATE R&B

## 2020-12-08 PROCEDURE — 85027 COMPLETE CBC AUTOMATED: CPT

## 2020-12-08 PROCEDURE — 94660 CPAP INITIATION&MGMT: CPT

## 2020-12-08 PROCEDURE — 6370000000 HC RX 637 (ALT 250 FOR IP): Performed by: INTERNAL MEDICINE

## 2020-12-08 RX ADMIN — ZINC SULFATE 220 MG (50 MG) CAPSULE 50 MG: CAPSULE at 23:05

## 2020-12-08 RX ADMIN — INSULIN LISPRO 1 UNITS: 100 INJECTION, SOLUTION INTRAVENOUS; SUBCUTANEOUS at 23:02

## 2020-12-08 RX ADMIN — VALSARTAN 320 MG: 320 TABLET ORAL at 10:18

## 2020-12-08 RX ADMIN — ENOXAPARIN SODIUM 40 MG: 40 INJECTION SUBCUTANEOUS at 10:22

## 2020-12-08 RX ADMIN — ALBUTEROL SULFATE 2 PUFF: 90 AEROSOL, METERED RESPIRATORY (INHALATION) at 23:00

## 2020-12-08 RX ADMIN — DEXAMETHASONE 6 MG: 4 TABLET ORAL at 10:23

## 2020-12-08 RX ADMIN — INSULIN GLARGINE 19 UNITS: 100 INJECTION, SOLUTION SUBCUTANEOUS at 23:02

## 2020-12-08 RX ADMIN — ZINC SULFATE 220 MG (50 MG) CAPSULE 50 MG: CAPSULE at 10:31

## 2020-12-08 RX ADMIN — INSULIN LISPRO 6 UNITS: 100 INJECTION, SOLUTION INTRAVENOUS; SUBCUTANEOUS at 06:28

## 2020-12-08 RX ADMIN — Medication 10 ML: at 23:06

## 2020-12-08 RX ADMIN — ALBUTEROL SULFATE 2 PUFF: 90 AEROSOL, METERED RESPIRATORY (INHALATION) at 10:25

## 2020-12-08 RX ADMIN — ENOXAPARIN SODIUM 40 MG: 40 INJECTION SUBCUTANEOUS at 23:05

## 2020-12-08 RX ADMIN — Medication 10 ML: at 10:23

## 2020-12-08 RX ADMIN — INSULIN LISPRO 6 UNITS: 100 INJECTION, SOLUTION INTRAVENOUS; SUBCUTANEOUS at 16:44

## 2020-12-08 RX ADMIN — INSULIN LISPRO 8 UNITS: 100 INJECTION, SOLUTION INTRAVENOUS; SUBCUTANEOUS at 16:44

## 2020-12-08 RX ADMIN — INSULIN LISPRO 4 UNITS: 100 INJECTION, SOLUTION INTRAVENOUS; SUBCUTANEOUS at 06:27

## 2020-12-08 RX ADMIN — Medication 1000 MG: at 10:23

## 2020-12-08 ASSESSMENT — PAIN SCALES - GENERAL
PAINLEVEL_OUTOF10: 0

## 2020-12-08 NOTE — PROGRESS NOTES
isolation maintained according to CDC protocol - pulmonary on      3. Chronic kidney disease stage III - BUN slowly trending down - maybe d/t dehydration - IV fluids continued - will monitor      4. Type 2 diabetes mellitus - uncontrolled - secondary to Decadron - Lantus and sliding scale - continue follow BG      5. Hypertension - BP well controlled with medication     6.  Acute hyponatremia - IV fluids continued - HCTZ discontinued 12/7/20 - will consider stopping fluids tomorrow - sodium now wnl           Electronically signed by Beryle Lacer, APRN - CNP on 12/8/2020 at 10:27 AM

## 2020-12-08 NOTE — PROGRESS NOTES
[facility] in Pulmonary and [address] [facility]  [address], [address]  CH[facility]STOPHER [facility] - BEHAVIORAL HEALTH SERVICES, 17 [facility]      Pulmonary Progress Note      SUBJECTIVE:  Still on optiflow 61 li 70%, uses BIPAP at night and tolerates, feeling some better with respiratory function with mod cough/sputum production, claims tries to do prone positioning as best he can.      OBJECTIVE    Medications    Continuous Infusions:   sodium chloride 50 mL/hr at 20 1641       Scheduled Meds:   insulin glargine  0.25 Units/kg (Ideal) Subcutaneous Nightly    insulin lispro  0.08 Units/kg (Ideal) Subcutaneous TID WC    insulin lispro  0-12 Units Subcutaneous TID WC    insulin lispro  0-6 Units Subcutaneous Nightly    dexamethasone  6 mg Oral Daily    sodium chloride  1 spray Each Nostril BID    albuterol sulfate HFA  2 puff Inhalation TID    sodium chloride flush  10 mL Intravenous 2 times per day    enoxaparin  40 mg Subcutaneous BID    vitamin C  1,000 mg Oral Daily    zinc sulfate  50 mg Oral BID    valsartan  320 mg Oral Daily       PRN Meds:glucose, dextrose, glucagon (rDNA), acetaminophen **OR** acetaminophen, sodium chloride flush, acetaminophen **OR** acetaminophen, polyethylene glycol, promethazine **OR** ondansetron, sodium chloride    Physical    VITALS:  /67   Pulse 61   Temp 97.9 °F (36.6 °C) (Oral)   Resp 22   Ht 5' 10.5\" (1.791 m)   Wt 246 lb 1.6 oz (111.6 kg)   SpO2 92%   BMI 34.81 kg/m²     24HR INTAKE/OUTPUT:      Intake/Output Summary (Last 24 hours) at 2020 1518  Last data filed at 2020 1018  Gross per 24 hour   Intake 1405 ml   Output 600 ml   Net 805 ml       24HR PULSE OXIMETRY RANGE:    SpO2  Av.2 %  Min: 92 %  Max: 95 %    General appearance: alert, appears stated age and cooperative  Lungs: rhonchi bilaterally with cough  Heart: regular rate and rhythm, S1, S2 normal, no murmur, click, rub or gallop  Abdomen: soft, non-tender; bowel sounds normal; no masses, no organomegaly  Extremities: extremities normal, atraumatic, no cyanosis or edema  Neurologic: Mental status: Alert, oriented, thought content appropriate    Data    CBC:   Recent Labs     12/06/20 0420 12/07/20 0438 12/08/20  0610   WBC 8.4 9.1 9.4   HGB 13.8 13.6 13.6   HCT 42.8 40.6 41.7   MCV 91.3 91.9 91.4    200 201       BMP:  Recent Labs     12/06/20 0420 12/07/20 0438 12/08/20  0610   * 129* 133   K 4.6 4.5 4.5   CL 98 97* 101   CO2 27 25 26   BUN 27* 28* 25*   CREATININE 1.0 1.1 1.1    ALB:3,BILIDIR:3,BILITOT:3,ALKPHOS:3)@    PT/INR:   Recent Labs     12/07/20 0438   PROTIME 11.2   INR 1.0       ABG:   No results for input(s): PH, PO2, PCO2, HCO3, BE, O2SAT, METHB, O2HB, COHB, O2CON, HHB, THB in the last 72 hours. FiO2 : 70 %       Radiology/Other tests reviewed: CXR reviewed with slight increased GG opacities bilateral    Assessment:     Active Problems:    Pneumonia due to COVID-19 virus  Resolved Problems:    * No resolved hospital problems. *  hypoxia  DILLAN    Plan:       1. Cont with decadron  2. Cont with albuterol mdi  3. Cont with optiflow daytime and taper as tolerated, NIPPV at night, prone positioning if able to  4. Nasal saline bid and flutter device, see if helps with cough and sputum production  5. OOB to chair, ambulate with assistance  6. May need to consider acute rehab for increased oxygen requirements and PT/OT      Time at the bedside, reviewing labs and radiographs, reviewing notes and consultations, discussing with staff and family was more than 35 minutes. Thanks for letting us see this patient in consultation. Please contact us with any questions. Office [phone] or after hours through [employer], x [phone].

## 2020-12-09 ENCOUNTER — APPOINTMENT (OUTPATIENT)
Dept: GENERAL RADIOLOGY | Age: 85
DRG: 177 | End: 2020-12-09
Payer: MEDICARE

## 2020-12-09 LAB
METER GLUCOSE: 136 MG/DL (ref 74–99)
METER GLUCOSE: 149 MG/DL (ref 74–99)
METER GLUCOSE: 261 MG/DL (ref 74–99)
METER GLUCOSE: 309 MG/DL (ref 74–99)
PRO-BNP: 731 PG/ML (ref 0–450)

## 2020-12-09 PROCEDURE — 82962 GLUCOSE BLOOD TEST: CPT

## 2020-12-09 PROCEDURE — 71045 X-RAY EXAM CHEST 1 VIEW: CPT

## 2020-12-09 PROCEDURE — 36415 COLL VENOUS BLD VENIPUNCTURE: CPT

## 2020-12-09 PROCEDURE — 2700000000 HC OXYGEN THERAPY PER DAY

## 2020-12-09 PROCEDURE — 2060000000 HC ICU INTERMEDIATE R&B

## 2020-12-09 PROCEDURE — 83880 ASSAY OF NATRIURETIC PEPTIDE: CPT

## 2020-12-09 PROCEDURE — 6370000000 HC RX 637 (ALT 250 FOR IP): Performed by: INTERNAL MEDICINE

## 2020-12-09 PROCEDURE — 6360000002 HC RX W HCPCS: Performed by: INTERNAL MEDICINE

## 2020-12-09 PROCEDURE — 99239 HOSP IP/OBS DSCHRG MGMT >30: CPT | Performed by: INTERNAL MEDICINE

## 2020-12-09 PROCEDURE — 2580000003 HC RX 258: Performed by: INTERNAL MEDICINE

## 2020-12-09 PROCEDURE — XW13325 TRANSFUSION OF CONVALESCENT PLASMA (NONAUTOLOGOUS) INTO PERIPHERAL VEIN, PERCUTANEOUS APPROACH, NEW TECHNOLOGY GROUP 5: ICD-10-PCS | Performed by: INTERNAL MEDICINE

## 2020-12-09 PROCEDURE — 94660 CPAP INITIATION&MGMT: CPT

## 2020-12-09 RX ORDER — NICOTINE POLACRILEX 4 MG
15 LOZENGE BUCCAL PRN
Qty: 45 G | Refills: 1 | Status: SHIPPED | OUTPATIENT
Start: 2020-12-09 | End: 2021-02-05

## 2020-12-09 RX ORDER — DEXAMETHASONE 6 MG/1
6 TABLET ORAL DAILY
Qty: 10 TABLET | Refills: 0 | Status: SHIPPED | OUTPATIENT
Start: 2020-12-10 | End: 2020-12-20

## 2020-12-09 RX ORDER — INSULIN GLARGINE 100 [IU]/ML
0.25 INJECTION, SOLUTION SUBCUTANEOUS NIGHTLY
Qty: 1 VIAL | Refills: 3 | Status: SHIPPED | OUTPATIENT
Start: 2020-12-09 | End: 2021-02-05

## 2020-12-09 RX ORDER — ALBUTEROL SULFATE 90 UG/1
2 AEROSOL, METERED RESPIRATORY (INHALATION) 3 TIMES DAILY
Qty: 1 INHALER | Refills: 3 | Status: SHIPPED | OUTPATIENT
Start: 2020-12-09 | End: 2021-04-07 | Stop reason: SDUPTHER

## 2020-12-09 RX ORDER — ONDANSETRON 2 MG/ML
4 INJECTION INTRAMUSCULAR; INTRAVENOUS EVERY 6 HOURS PRN
Qty: 84 ML | Refills: 0 | Status: SHIPPED | OUTPATIENT
Start: 2020-12-09 | End: 2021-02-05

## 2020-12-09 RX ORDER — CHOLECALCIFEROL (VITAMIN D3) 50 MCG
2000 TABLET ORAL DAILY
Qty: 30 TABLET | Refills: 0 | Status: SHIPPED | OUTPATIENT
Start: 2020-12-09 | End: 2021-02-05

## 2020-12-09 RX ORDER — ZINC SULFATE 50(220)MG
50 CAPSULE ORAL 2 TIMES DAILY
Qty: 30 CAPSULE | Refills: 3 | COMMUNITY
Start: 2020-12-09 | End: 2021-02-05

## 2020-12-09 RX ORDER — CHOLECALCIFEROL (VITAMIN D3) 50 MCG
2000 TABLET ORAL DAILY
Status: DISCONTINUED | OUTPATIENT
Start: 2020-12-09 | End: 2020-12-10 | Stop reason: HOSPADM

## 2020-12-09 RX ORDER — FUROSEMIDE 20 MG/1
20 TABLET ORAL 2 TIMES DAILY
Qty: 60 TABLET | Refills: 3 | Status: SHIPPED | OUTPATIENT
Start: 2020-12-09 | End: 2021-02-05

## 2020-12-09 RX ORDER — POLYETHYLENE GLYCOL 3350 17 G/17G
17 POWDER, FOR SOLUTION ORAL DAILY PRN
Qty: 527 G | Refills: 1 | Status: SHIPPED | OUTPATIENT
Start: 2020-12-09 | End: 2021-01-08

## 2020-12-09 RX ORDER — FUROSEMIDE 20 MG/1
20 TABLET ORAL 2 TIMES DAILY
Status: DISCONTINUED | OUTPATIENT
Start: 2020-12-09 | End: 2020-12-10

## 2020-12-09 RX ADMIN — ZINC SULFATE 220 MG (50 MG) CAPSULE 50 MG: CAPSULE at 10:32

## 2020-12-09 RX ADMIN — INSULIN LISPRO 2 UNITS: 100 INJECTION, SOLUTION INTRAVENOUS; SUBCUTANEOUS at 06:59

## 2020-12-09 RX ADMIN — INSULIN LISPRO 6 UNITS: 100 INJECTION, SOLUTION INTRAVENOUS; SUBCUTANEOUS at 16:51

## 2020-12-09 RX ADMIN — DEXAMETHASONE 6 MG: 4 TABLET ORAL at 10:32

## 2020-12-09 RX ADMIN — ZINC SULFATE 220 MG (50 MG) CAPSULE 50 MG: CAPSULE at 20:54

## 2020-12-09 RX ADMIN — ENOXAPARIN SODIUM 40 MG: 40 INJECTION SUBCUTANEOUS at 20:54

## 2020-12-09 RX ADMIN — ALBUTEROL SULFATE 2 PUFF: 90 AEROSOL, METERED RESPIRATORY (INHALATION) at 20:55

## 2020-12-09 RX ADMIN — Medication 10 ML: at 20:54

## 2020-12-09 RX ADMIN — INSULIN LISPRO 6 UNITS: 100 INJECTION, SOLUTION INTRAVENOUS; SUBCUTANEOUS at 12:10

## 2020-12-09 RX ADMIN — ASPIRIN 325 MG: 325 TABLET, COATED ORAL at 15:08

## 2020-12-09 RX ADMIN — ALBUTEROL SULFATE 2 PUFF: 90 AEROSOL, METERED RESPIRATORY (INHALATION) at 10:35

## 2020-12-09 RX ADMIN — VALSARTAN 320 MG: 320 TABLET ORAL at 10:32

## 2020-12-09 RX ADMIN — Medication 1000 MG: at 10:31

## 2020-12-09 RX ADMIN — ENOXAPARIN SODIUM 40 MG: 40 INJECTION SUBCUTANEOUS at 10:31

## 2020-12-09 RX ADMIN — FUROSEMIDE 20 MG: 20 TABLET ORAL at 15:08

## 2020-12-09 RX ADMIN — Medication 2000 UNITS: at 15:08

## 2020-12-09 RX ADMIN — Medication 10 ML: at 10:32

## 2020-12-09 RX ADMIN — INSULIN LISPRO 6 UNITS: 100 INJECTION, SOLUTION INTRAVENOUS; SUBCUTANEOUS at 07:00

## 2020-12-09 RX ADMIN — ALBUTEROL SULFATE 2 PUFF: 90 AEROSOL, METERED RESPIRATORY (INHALATION) at 15:09

## 2020-12-09 RX ADMIN — INSULIN GLARGINE 19 UNITS: 100 INJECTION, SOLUTION SUBCUTANEOUS at 21:47

## 2020-12-09 RX ADMIN — INSULIN LISPRO 4 UNITS: 100 INJECTION, SOLUTION INTRAVENOUS; SUBCUTANEOUS at 21:47

## 2020-12-09 ASSESSMENT — PAIN SCALES - GENERAL: PAINLEVEL_OUTOF10: 0

## 2020-12-09 NOTE — PROGRESS NOTES
[facility] in Pulmonary and [address] [facility]  [address], [address]  CH[facility]STOPHER [facility] - BEHAVIORAL HEALTH SERVICES, [address] [place] [address]      Pulmonary Progress Note      SUBJECTIVE:  Decreased optiflow to 60 li 60%, uses BIPAP at night and tolerates, feeling similar with respiratory function with mod cough/sputum production, claims tries to do prone positioning as best he can.      OBJECTIVE    Medications    Continuous Infusions:      Scheduled Meds:   vitamin D  2,000 Units Oral Daily    aspirin  325 mg Oral Daily    furosemide  20 mg Oral BID    insulin glargine  0.25 Units/kg (Ideal) Subcutaneous Nightly    insulin lispro  0.08 Units/kg (Ideal) Subcutaneous TID WC    insulin lispro  0-12 Units Subcutaneous TID WC    insulin lispro  0-6 Units Subcutaneous Nightly    dexamethasone  6 mg Oral Daily    sodium chloride  1 spray Each Nostril BID    albuterol sulfate HFA  2 puff Inhalation TID    sodium chloride flush  10 mL Intravenous 2 times per day    enoxaparin  40 mg Subcutaneous BID    vitamin C  1,000 mg Oral Daily    zinc sulfate  50 mg Oral BID    valsartan  320 mg Oral Daily       PRN Meds:glucose, dextrose, glucagon (rDNA), acetaminophen **OR** acetaminophen, sodium chloride flush, acetaminophen **OR** acetaminophen, polyethylene glycol, promethazine **OR** ondansetron, sodium chloride    Physical    VITALS:  /72   Pulse 60   Temp 98 °F (36.7 °C) (Oral)   Resp 22   Ht 5' 10.5\" (1.791 m)   Wt 246 lb 1.6 oz (111.6 kg)   SpO2 90%   BMI 34.81 kg/m²     24HR INTAKE/OUTPUT:      Intake/Output Summary (Last 24 hours) at 2020 1346  Last data filed at 2020 0800  Gross per 24 hour   Intake 1920 ml   Output 650 ml   Net 1270 ml       24HR PULSE OXIMETRY RANGE:    SpO2  Av %  Min: 90 %  Max: 97 %    General appearance: alert, appears stated age and cooperative  Lungs: rhonchi bilaterally with cough  Heart: regular rate and rhythm, S1, S2 normal, no murmur, click, rub or

## 2020-12-09 NOTE — DISCHARGE INSTR - COC
Continuity of Care Form    Patient Name: Allie Seymour   :  1934  MRN:  [id number]    Admit date:  2020  Discharge date:  12/10/2020    Code Status Order: Full Code   Advance Directives:   [facility] Documentation     Date/Time Healthcare Directive Type of Healthcare Directive Copy in [facility] [address] Agent's Name Healthcare Agent's Phone Number    20 1904  No, patient does not have an advance directive for healthcare treatment  --  --  Spouse  Bo Moreau  [phone]          Admitting Physician:  Chelsie Vyas MD  PCP: Judy Manriquez MD    Discharging Nurse: [address] [place] [address] Unit/Room#: 3856/0901-N  Discharging Unit Phone Number: [phone]    Emergency Contact:   Extended Emergency Contact Information  Primary Emergency Contact: Arvil Economy of [address] [place] [address] Phone: [phone]  Mobile Phone: [phone]  Relation: Spouse   needed? No  Secondary Emergency Contact: Poonam Grim States of [address] [place] [address] Phone: [phone]  Mobile Phone: [phone]  Relation: Child   needed? No    Past Surgical History:  Past Surgical History:   Procedure Laterality Date    LAPAROSCOPIC APPENDECTOMY N/A 2018    LAPAROSCOPIC APPENDECTOMY performed by Jamaal Tom MD at [address] [place] [address]  2005    right upper back,        Immunization History: There is no immunization history on file for this patient.     Active Problems:  Patient Active Problem List   Diagnosis Code    Impaired fasting glucose R73.01    Osteoarthrosis involving multiple sites M15.9    Chronic kidney disease (CKD), stage III (moderate) N18.30    Benign essential hypertension I10    Basal cell carcinoma of skin C44.91    Appendicitis K37    Pneumonia due to COVID-19 virus U07.1, J12.89       Isolation/Infection:   Isolation          Droplet Plus        Patient Infection Status     Infection Onset Added Last Indicated Last Indicated By Review Planned Expiration Resolved Resolved By    COVID-19 11/27/20 11/27/20 11/27/20 Respiratory Panel, Molecular, with COVID-19 (Restricted: peds pts or suitable admitted adults) 12/04/20 12/11/20      Resolved    COVID-19 Rule Out 11/27/20 11/27/20 11/27/20 Respiratory Panel, Molecular, with COVID-19 (Restricted: peds pts or suitable admitted adults) (Ordered)   11/27/20 Rule-Out Test Resulted          Nurse Assessment:  Last Vital Signs: /72   Pulse 60   Temp 98 °F (36.7 °C) (Oral)   Resp 22   Ht 5' 10.5\" (1.791 m)   Wt 246 lb 1.6 oz (111.6 kg)   SpO2 90%   BMI 34.81 kg/m²     Last documented pain score (0-10 scale): Pain Level: 0  Last Weight:   Wt Readings from Last 1 Encounters:   12/07/20 246 lb 1.6 oz (111.6 kg)     Mental Status:  oriented, alert, coherent and logical    IV Access:  - Peripheral IV - site  l wrist placed 12/04/2020, insertion date: 12/04/2020    Nursing Mobility/ADLs:  Walking   Independent  Transfer  Independent  Bathing  Assisted  Dressing  Assisted  Toileting  Independent  Feeding  [facility] Delivery   whole and with water    Wound Care Documentation and Therapy:  Incision 12/05/18 Abdomen (Active)   Number of days: 734        Elimination:  Continence:   · Bowel: y  · Bladder: Yes  Urinary Catheter: None   Colostomy/Ileostomy/Ileal Conduit: No       Date of Last BM: 12/09/2020    Intake/Output Summary (Last 24 hours) at 12/9/2020 0948  Last data filed at 12/9/2020 0800  Gross per 24 hour   Intake 2160 ml   Output 1000 ml   Net 1160 ml     I/O last 3 completed shifts:   In: 4672 [P.O.:660; I.V.:1680]  Out: 1100 [Urine:1100]    Safety Concerns:     None    Impairments/Disabilities:      None    Nutrition Therapy:  Current Nutrition Therapy:   - Oral Diet:  Carb Control 4 carbs/meal (1800kcals/day)  - Oral Nutrition Supplement:  Standard  twice a day and Low Calorie High Protein twice a day    Routes of Feeding: Oral  Liquids: Thin Liquids  Daily Fluid Restriction: no  Last Modified Barium Swallow with Video (Video Swallowing Test): not done    Treatments at the Time of Hospital Discharge:   Respiratory Treatments: See mar  Oxygen Therapy:  cpap at night   Ventilator:    - CPAP   only when sleeping    Rehab Therapies: Physical Therapy, Occupational Therapy and Speech/Language Therapy  Weight Bearing Status/Restrictions: No weight bearing restirctions  Other Medical Equipment (for information only, NOT a DME order):  {EQUIPMENT:246566555}  Other Treatments:see mar    Patient's personal belongings (please select all that are sent with patient):  Patient belongings sent with patient    RN SIGNATURE:  Brea Rey    CASE MANAGEMENT/SOCIAL WORK SECTION    Inpatient Status Date: ***    Readmission Risk Assessment Score:  Readmission Risk              Risk of Unplanned Readmission:        17           Discharging to Facility/ Agency   · Name: [address] [place] [address]  · [facility], [address] [place] [address] Drive  · Phone:[phone]  · Fax:[phone]    Dialysis Facility (if applicable)   · Name:  · Address:  · Dialysis Schedule:  · Phone:  · Fax:    / signature: {Esignature:[id number]} Electronically signed by ANA Addison on 12/9/20 at 9:50 AM EST      PHYSICIAN SECTION    Prognosis: {Prognosis:8997315321}    Condition at Discharge: Stable    Rehab Potential (if transferring to Rehab): {Prognosis:6559955207}    Recommended Labs or Other Treatments After Discharge: ***    Physician Certification: I certify the above information and transfer of Esperanza Kam  is necessary for the continuing treatment of the diagnosis listed and that he requires LTAC for less 30 days.      Update Admission H&P: {CHP DME Changes in ILMFS:889707883}    PHYSICIAN SIGNATURE:  {Esignature:[id number]}

## 2020-12-09 NOTE — PROGRESS NOTES
Reason for consult:  New type 2 DM    A1C: 7.4%     [] Not available                 Patient states the following concerns/barriers to diabetes self-management:       [x] None       [] Medication cost   [] Food cost/availability          [] Reading  [] Hearing   [] Vision                  [] Work    [] Transportation  [] No insurance    [] Physical limitations    [] Other:              Diabetes survival packet provided to:   [x] Patient     [] Other:    Information reviewed:   Definition of diabetes   Target glucose ranges/A1C   Self-monitoring of blood glucose   Prevention/symptoms/treatment of hypo-/hyperglycemia   Medication adherence   The plate method/meal planning guidelines   The benefits of exercise and recommendations   Reducing the risk of chronic complications          Comment:  Patient very pleasant and receptive to education. He states that he has had borderline diabetes and understands that COVID-19 pneumonia and the steroids used to treat his pneumonia are exacerbating his glucose levels. He will be going to an LTAC and states he plans on practicing watching his carbohydrate portions of food while there. He states that his appetite is good now. He likes to eat pancakes, waffles, English muffins, potatoes, and fruit for his carbohydrate choices at home and understands that he can still eat them, but he should be mindful of his portions at each meal.  I included carb-counting and glucose monitoring instructions on his discharge papers. He will eventually need a script for a glucose monitor and testing supplies. His wife has diabetes and he states that she will be able to help him manage his diabetes once back home. I encouraged him to follow up with outpatient diabetes education once he is recovered. In light of his advanced age, it would be appropriate to relax his A1C goal to maintain it less than 8%.      Evaluation/Plan/Recommendations:   Patient's understanding of diabetes:   []Poor   [x]Fair

## 2020-12-09 NOTE — DISCHARGE SUMMARY
[facility] Physician Discharge Summary        [facility]  [address] [place] [address] [phone]          Activity level: as tolerated     Diet: Dietary Nutrition Supplements: Standard High Calorie Oral Supplement  DIET CARB CONTROL; Carb Control: 4 carbs/meal (approximate 1800 kcals/day)    Labs: CBC bmp    Condition at discharge: Stable    Dispo: Charge to LTAC    Continue supplemental oxygen via nasal canula @ 2 LPM round-the-clock. Continue CPAP / BiPAP during sleep as prior to admission. Patient ID:  Inna Collier  [id number]  89 y.o.  1934    Admit date: 11/27/2020    Discharge date and time:  12/9/2020  11:57 AM    Admission Diagnoses: Active Problems:  Acute hypoxemic respiratory failure  Pneumonia due to Covid B19 infection  Morbid obesity BMI of 34  Essential hypertension\  PHYSICAL  Deconditioning  Morbid obesity BMI of 34    Discharge Diagnoses:  Active Problems:  Acute hypoxemic respiratory failure   Viral pneumonia due to  Covid B19  Infection   Newly diagnosed diabetes diabetes mellitus   Uncontrolled HTN   Morbid obesity BMI of 34  Obstructive sleep apnea    Consults:  IP CONSULT TO INTERNAL MEDICINE  IP CONSULT TO PHARMACY  IP CONSULT TO DIETITIAN  IP CONSULT TO PULMONOLOGY    Procedures:     Hospital Course: Patient is a 80-year-old gentleman with past medical history significant for hypertension presented to the hospital with acute hypoxemic respiratory failure chest x-ray showed multilobar pneumonia patient tested positive for Covid B19 infection during his hospitalization he received remdesivir and convalescent plasma as well as Decadron with partial improvement of his symptoms, requires 15 L of oxygen prone position as well as pulmonary toilet was advised patient will go to LTAC to continue pulmonary rehab, on admission also he was found to have an elevated fasting blood sugar of 239 hemoglobin A1c 7.2 patient is currently on Lantus and lispro recommend close follow-up with diabetic educator and teaching    Discharge Exam:  Vitals:    12/08/20 2245 12/09/20 0040 12/09/20 0437 12/09/20 0800   BP: (!) 163/83   128/72   Pulse: 59   60   Resp: 21  21 22   Temp: 97.7 °F (36.5 °C)   98 °F (36.7 °C)   TempSrc: Axillary   Oral   SpO2: 97% 96%  90%   Weight:       Height:           General Appearance: alert and oriented to person, place and time, well developed and well- nourished, in no acute distress  Skin: warm and dry, no rash or erythema  Head: normocephalic and atraumatic  Eyes: pupils equal, round, and reactive to light, extraocular eye movements intact, conjunctivae normal  ENT: tympanic membrane, external ear and ear canal normal bilaterally, nose without deformity, nasal mucosa and turbinates normal without polyps  Neck: supple and non-tender without mass, no thyromegaly or thyroid nodules, no cervical lymphadenopathy  Pulmonary/Chest: clear to auscultation bilaterally- no wheezes, rales or rhonchi, normal air movement, no respiratory distress  Cardiovascular: normal rate, regular rhythm, normal S1 and S2, no murmurs, rubs, clicks, or gallops, distal pulses intact, no carotid bruits  Abdomen: soft, non-tender, non-distended, normal bowel sounds, no masses or organomegaly  Extremities: no cyanosis, clubbing or edema  Musculoskeletal: normal range of motion, no joint swelling, deformity or tenderness  Neurologic: reflexes normal and symmetric, no cranial nerve deficit, gait, coordination and speech normal  I/O last 3 completed shifts:   In: 0836 [P.O.:660; I.V.:1680]  Out: 1100 [Urine:1100]  I/O this shift:  In: -   Out: 150 [Urine:150]      LABS:  Recent Labs     12/07/20 0438 12/08/20  0610   * 133   K 4.5 4.5   CL 97* 101   CO2 25 26   BUN 28* 25*   CREATININE 1.1 1.1   GLUCOSE 254* 242*   CALCIUM 8.1* 8.2*       Recent Labs     12/07/20 0438 12/08/20  0610   WBC 9.1 9.4   RBC 4. 42 4.56   HGB 13.6 13.6   HCT 40.6 41.7   MCV 91.9 91.4   MCH 30.8 29.8   MCHC 33.5 32.6   RDW 12.6 12.6    201   MPV 10.6 10.8       No results for input(s): POCGLU in the last 72 hours. CBC:   Lab Results   Component Value Date    WBC 9.4 12/08/2020    RBC 4.56 12/08/2020    HGB 13.6 12/08/2020    HCT 41.7 12/08/2020    MCV 91.4 12/08/2020    MCH 29.8 12/08/2020    MCHC 32.6 12/08/2020    RDW 12.6 12/08/2020     12/08/2020    MPV 10.8 12/08/2020     BMP:    Lab Results   Component Value Date     12/08/2020    K 4.5 12/08/2020    K 4.0 11/27/2020     12/08/2020    CO2 26 12/08/2020    BUN 25 12/08/2020    LABALBU 2.3 12/08/2020    CREATININE 1.1 12/08/2020    CALCIUM 8.2 12/08/2020    GFRAA >60 12/08/2020    LABGLOM >60 12/08/2020    GLUCOSE 242 12/08/2020       Imaging:  Xr Chest Portable    Result Date: 11/27/2020  EXAMINATION: ONE XRAY VIEW OF THE CHEST 11/27/2020 8:10 am COMPARISON: Chest radiograph December 5, 2018 HISTORY: ORDERING SYSTEM PROVIDED HISTORY: COVID? TECHNOLOGIST PROVIDED HISTORY: Reason for exam:->COVID? FINDINGS: Cardiomediastinal silhouette is stable in size. There are airspace opacities seen in bilateral lungs. No pneumothorax. No sizable pleural effusions. Osseous structures are grossly unremarkable. Bilateral airspace opacity may represent infectious process in the proper clinical setting. Patient Instructions:   Current Discharge Medication List      CONTINUE these medications which have NOT CHANGED    Details   valsartan-hydrochlorothiazide (DIOVAN-HCT) 320-12.5 MG per tablet Take 1 tablet by mouth daily. Associated Diagnoses: Impaired fasting glucose; Osteoarthrosis involving multiple sites; Chronic kidney disease (CKD), stage III (moderate); Benign essential hypertension; Basal cell carcinoma of skin; SOB (shortness of breath); Edema;  Dizziness               Note spend more  than 30 minutes was spent in preparing discharge papers,

## 2020-12-09 NOTE — PROGRESS NOTES
Date: 12/8/2020    Time: 10:09 PM    Patient Placed On BIPAP/CPAP/ Non-Invasive Ventilation? Yes    If no must comment. Facial area red/color change? No           If YES are Blister/Lesion present? Yes   If yes must notify nursing staff  BIPAP/CPAP skin barrier?   Yes    Skin barrier type:mepilexlite       Comments:pt does have redness and what looks like to be a sore that he says has been there on the left side of his nose        Rissa Flores

## 2020-12-09 NOTE — CARE COORDINATION
Social Work / Discharge Planning : COVID POSITIVE:SW reached out to Myo Smith from [employer]. Moy Smith did get approval from his . However, need to wean patient down to 50. CM updated and she will update family. Moy Smith stated he will have his respiratory therapist look at patient as well. SW to follow.  Electronically signed by ANA López on 12/9/20 at 9:47 AM EST

## 2020-12-10 VITALS
TEMPERATURE: 97.5 F | BODY MASS INDEX: 34.45 KG/M2 | OXYGEN SATURATION: 96 % | RESPIRATION RATE: 20 BRPM | SYSTOLIC BLOOD PRESSURE: 165 MMHG | HEIGHT: 71 IN | WEIGHT: 246.1 LBS | HEART RATE: 68 BPM | DIASTOLIC BLOOD PRESSURE: 81 MMHG

## 2020-12-10 LAB
ALBUMIN SERPL-MCNC: 2.6 G/DL (ref 3.5–5.2)
ALP BLD-CCNC: 63 U/L (ref 40–129)
ALT SERPL-CCNC: 61 U/L (ref 0–40)
ANION GAP SERPL CALCULATED.3IONS-SCNC: 8 MMOL/L (ref 7–16)
AST SERPL-CCNC: 26 U/L (ref 0–39)
BILIRUB SERPL-MCNC: 0.4 MG/DL (ref 0–1.2)
BUN BLDV-MCNC: 29 MG/DL (ref 8–23)
CALCIUM SERPL-MCNC: 8.2 MG/DL (ref 8.6–10.2)
CHLORIDE BLD-SCNC: 97 MMOL/L (ref 98–107)
CO2: 27 MMOL/L (ref 22–29)
CREAT SERPL-MCNC: 1.1 MG/DL (ref 0.7–1.2)
GFR AFRICAN AMERICAN: >60
GFR NON-AFRICAN AMERICAN: >60 ML/MIN/1.73
GLUCOSE BLD-MCNC: 217 MG/DL (ref 74–99)
HCT VFR BLD CALC: 41.9 % (ref 37–54)
HEMOGLOBIN: 13.6 G/DL (ref 12.5–16.5)
Lab: NORMAL
MCH RBC QN AUTO: 30 PG (ref 26–35)
MCHC RBC AUTO-ENTMCNC: 32.5 % (ref 32–34.5)
MCV RBC AUTO: 92.5 FL (ref 80–99.9)
METER GLUCOSE: 157 MG/DL (ref 74–99)
METER GLUCOSE: 195 MG/DL (ref 74–99)
PDW BLD-RTO: 12.7 FL (ref 11.5–15)
PLATELET # BLD: 176 E9/L (ref 130–450)
PMV BLD AUTO: 10.4 FL (ref 7–12)
POTASSIUM SERPL-SCNC: 4.5 MMOL/L (ref 3.5–5)
RBC # BLD: 4.53 E12/L (ref 3.8–5.8)
REPORT: NORMAL
SODIUM BLD-SCNC: 132 MMOL/L (ref 132–146)
THIS TEST SENT TO: NORMAL
TOTAL PROTEIN: 5.4 G/DL (ref 6.4–8.3)
WBC # BLD: 9.6 E9/L (ref 4.5–11.5)

## 2020-12-10 PROCEDURE — 6370000000 HC RX 637 (ALT 250 FOR IP): Performed by: INTERNAL MEDICINE

## 2020-12-10 PROCEDURE — 82962 GLUCOSE BLOOD TEST: CPT

## 2020-12-10 PROCEDURE — 80053 COMPREHEN METABOLIC PANEL: CPT

## 2020-12-10 PROCEDURE — 2580000003 HC RX 258: Performed by: INTERNAL MEDICINE

## 2020-12-10 PROCEDURE — 36415 COLL VENOUS BLD VENIPUNCTURE: CPT

## 2020-12-10 PROCEDURE — 94660 CPAP INITIATION&MGMT: CPT

## 2020-12-10 PROCEDURE — 6360000002 HC RX W HCPCS: Performed by: INTERNAL MEDICINE

## 2020-12-10 PROCEDURE — 85027 COMPLETE CBC AUTOMATED: CPT

## 2020-12-10 PROCEDURE — 2700000000 HC OXYGEN THERAPY PER DAY

## 2020-12-10 PROCEDURE — 99239 HOSP IP/OBS DSCHRG MGMT >30: CPT | Performed by: INTERNAL MEDICINE

## 2020-12-10 RX ORDER — 0.9 % SODIUM CHLORIDE 0.9 %
500 INTRAVENOUS SOLUTION INTRAVENOUS ONCE
Status: COMPLETED | OUTPATIENT
Start: 2020-12-10 | End: 2020-12-10

## 2020-12-10 RX ADMIN — ALBUTEROL SULFATE 2 PUFF: 90 AEROSOL, METERED RESPIRATORY (INHALATION) at 08:31

## 2020-12-10 RX ADMIN — INSULIN LISPRO 2 UNITS: 100 INJECTION, SOLUTION INTRAVENOUS; SUBCUTANEOUS at 11:35

## 2020-12-10 RX ADMIN — INSULIN LISPRO 6 UNITS: 100 INJECTION, SOLUTION INTRAVENOUS; SUBCUTANEOUS at 06:52

## 2020-12-10 RX ADMIN — INSULIN LISPRO 6 UNITS: 100 INJECTION, SOLUTION INTRAVENOUS; SUBCUTANEOUS at 12:23

## 2020-12-10 RX ADMIN — ZINC SULFATE 220 MG (50 MG) CAPSULE 50 MG: CAPSULE at 08:34

## 2020-12-10 RX ADMIN — INSULIN LISPRO 2 UNITS: 100 INJECTION, SOLUTION INTRAVENOUS; SUBCUTANEOUS at 06:51

## 2020-12-10 RX ADMIN — SALINE NASAL SPRAY 1 SPRAY: 1.5 SOLUTION NASAL at 08:35

## 2020-12-10 RX ADMIN — Medication 2000 UNITS: at 08:32

## 2020-12-10 RX ADMIN — ASPIRIN 325 MG: 325 TABLET, COATED ORAL at 08:34

## 2020-12-10 RX ADMIN — Medication 10 ML: at 08:34

## 2020-12-10 RX ADMIN — ENOXAPARIN SODIUM 40 MG: 40 INJECTION SUBCUTANEOUS at 08:35

## 2020-12-10 RX ADMIN — DEXAMETHASONE 6 MG: 4 TABLET ORAL at 08:32

## 2020-12-10 RX ADMIN — Medication 1000 MG: at 08:34

## 2020-12-10 RX ADMIN — SODIUM CHLORIDE 500 ML: 9 INJECTION, SOLUTION INTRAVENOUS at 11:35

## 2020-12-10 RX ADMIN — ALBUTEROL SULFATE 2 PUFF: 90 AEROSOL, METERED RESPIRATORY (INHALATION) at 15:43

## 2020-12-10 ASSESSMENT — PAIN SCALES - GENERAL: PAINLEVEL_OUTOF10: 0

## 2020-12-10 NOTE — PROGRESS NOTES
[facility] bed [facility] report called to [facility]. Patient and his belongings transferred.

## 2020-12-10 NOTE — CARE COORDINATION
Per beth from [facility]; approved, can accept today 1:30 pm room 746. Informed 6w staff of need for N-N [phone]. beth spoke to pt; will speak with wife. will follow. Sukh Vazquez.

## 2020-12-28 ENCOUNTER — CARE COORDINATION (OUTPATIENT)
Dept: CASE MANAGEMENT | Age: 85
End: 2020-12-28

## 2020-12-28 NOTE — CARE COORDINATION
[facility] Transitions Initial Follow Up Call    Call within 2 business days of discharge:     Patient: Cyndi Farmer Patient : 1934   MRN: [id number]  Reason for Admission: PNA and COVID Positive  Discharge Date: 20 RARS: Readmission Risk Score: 21      Last Discharge [facility]       Complaint Diagnosis Description Type Department Provider    12/10/20   Admission (Discharged) Mac Newman MD           Spoke with: Patient's wife    Facility: [address] [place] [address] Transitions 24 Hour Call    Do you have a copy of your discharge instructions?: Yes  Do you have all of your prescriptions and are they filled?: Yes  Have you been contacted by a [address] [facility] Pharmacist?: No  Have you scheduled your follow up appointment?: Yes  How are you going to get to your appointment?: Car - family or friend to transport  Were you discharged with any Home Care or Post Acute Services: No  Do you feel like you have everything you need to keep you well at home?: Yes  Care Transitions Interventions         Follow Up:  Contacted patient for initial BPCI-A follow up. Spoke with patient's wife. Introduced self and provided explanation about the BPCI-A program and 90 day follow up. She is in agreement to follow up calls. She stated stated Viet Hong is doing good. Reports he had some breathing problems this morning which has resolved. She did not go into detail about what the problems were. He continues to use 2 1/2 L of oxygen. Has minimal cough. No c/o chest pain/discomfort, fever, chills. He is eating and drinking fluids to stay hydrated. They are aware of when to contact provider with any new or worsening symptoms. She stated he has an appointment with PCP tomorrow. Declined medication review. No questions or concerns at this time. Will continue to follow. No future appointments.     Gael Haque RN

## 2021-01-07 ENCOUNTER — CARE COORDINATION (OUTPATIENT)
Dept: CASE MANAGEMENT | Age: 86
End: 2021-01-07

## 2021-01-21 ENCOUNTER — CARE COORDINATION (OUTPATIENT)
Dept: CASE MANAGEMENT | Age: 86
End: 2021-01-21

## 2021-01-21 NOTE — CARE COORDINATION
[facility] Transitions Follow Up Call    2021    Patient: Mora Arrington  Patient : 1934   MRN: [id number]  Reason for Admission:   Discharge Date: 20 RARS: Readmission Risk Score: 21         Spoke briefly with: patient, Jasmin Ruiz and patient's spouse. .   Patient's spouse reports she feels the patient should be moving around more. Discussed to please follow up with PCP; patient may benefit from in home PT. Phone connection is of poor quality; discussed will continue to follow.        Darrian Cheema RN

## 2021-01-28 ENCOUNTER — CARE COORDINATION (OUTPATIENT)
Dept: CASE MANAGEMENT | Age: 86
End: 2021-01-28

## 2021-01-28 NOTE — CARE COORDINATION
[facility] Transitions Follow Up Call    2021    Patient: Robel Murphy  Patient : 1934   MRN: [id number]  Reason for Admission:   Discharge Date: 20 RARS: Readmission Risk Score: 21         Spoke with: Wynelle Dance, patient    Care Transitions Subsequent and Final Call    Subsequent and Final Calls  Have your medications changed?: No  Do you have any questions related to your medications?: No  Do you currently have any active services?: No  Do you have any needs or concerns that I can assist you with?: No  Identified Barriers: None  Care Transitions Interventions  Other Interventions: Follow Up:  Contacted patient for BPCI-A follow up. Dana Aguilar stated that he is not too bad. Continues to use 2 1/2 L of oxygen. O2 sats 94-97% with oxygen. He stated his O2 sats will drop to 88-90% on RA therefore he is keeping oxygen on. He stated he is riding his stationary bike. He is not overexerting himself. Has an occasional cough. Denies having any fever or chills. Appetite is good and he is staying hydrated. He is aware of when to contact provider with any new or worsening symptoms. He stated he has an appointment with the cardiologist on Wednesday, 2/3/21. No questions or concerns at this time.   Will continue to follow      Future Appointments   Date Time Provider Maxiom Canales   2/3/2021  9:15 AM MD [facility] Andersen [facility]       Arlet Alvarez RN

## 2021-02-03 ENCOUNTER — HOSPITAL ENCOUNTER (OUTPATIENT)
Age: 86
Discharge: HOME | End: 2021-02-05
Payer: MEDICARE

## 2021-02-03 ENCOUNTER — HOSPITAL ENCOUNTER (OUTPATIENT)
Dept: GENERAL RADIOLOGY | Age: 86
Discharge: HOME | End: 2021-02-05
Payer: MEDICARE

## 2021-02-03 DIAGNOSIS — J12.82 PNEUMONIA DUE TO COVID-19 VIRUS: ICD-10-CM

## 2021-02-03 DIAGNOSIS — U07.1 PNEUMONIA DUE TO COVID-19 VIRUS: ICD-10-CM

## 2021-02-03 PROCEDURE — 71046 X-RAY EXAM CHEST 2 VIEWS: CPT

## 2021-02-05 ENCOUNTER — TELEPHONE (OUTPATIENT)
Dept: PHARMACY | Facility: CLINIC | Age: 86
End: 2021-02-05

## 2021-02-05 ENCOUNTER — CARE COORDINATION (OUTPATIENT)
Dept: CASE MANAGEMENT | Age: 86
End: 2021-02-05

## 2021-02-05 NOTE — TELEPHONE ENCOUNTER
Called patient. Patient states that he is currently only taking his valsartan and has his albuterol inhaler as needed but has not had to use it. Patient not sure what all the other medications were, but he is not currently taking them. Patient states that he is doing fine and has no questions. Medication list updated to reflect current medications. No further outreach planned at this time. Cesilia Medina, PharmD, [address] [place], [place] [address]   Direct: [phone]  Department, toll free: [phone], option 7    CLINICAL PHARMACY CONSULT: MED RECONCILIATION/REVIEW ADDENDUM  For Pharmacy Admin Tracking Only  PHSO: Yes  Total # of Interventions Recommended: 1  - Updated Order #: 1 Updated Order Reason(s):  Other  Recommended intervention potential cost savings: 1  Total Interventions Accepted: 1  Time Spent (min): 30

## 2021-02-05 NOTE — TELEPHONE ENCOUNTER
Received a referral:  from Care Coordinator to remove patients medications. Called patient to schedule a time to speak with a pharmacist over the telephone. Patient states he only takes valsartan 320mg daily-no other meds. Kacie Alcazar CPhT.   Clinical Pharmacy   Toll free: [phone], option 7

## 2021-02-05 NOTE — TELEPHONE ENCOUNTER
Gualberto Angeles RN  P s Clinical Pharmacy Clinical Staff                Request complete medication review. Appears medications still remain in patient's list from Hospital stay and [facility]. Patient agrees to f/u call at [phone]. Chadd Holder goes by \"Brown\". Thank you!

## 2021-02-12 ENCOUNTER — CARE COORDINATION (OUTPATIENT)
Dept: CASE MANAGEMENT | Age: 86
End: 2021-02-12

## 2021-02-26 ENCOUNTER — CARE COORDINATION (OUTPATIENT)
Dept: CASE MANAGEMENT | Age: 86
End: 2021-02-26

## 2021-02-26 NOTE — CARE COORDINATION
[facility] Transitions Follow Up Call    2021    Patient: Radha Jenkins  Patient : 1934   MRN: [id number]  Reason for Admission:   Discharge Date: 20 RARS: Readmission Risk Score: 21         Spoke with: Patient, Mark Sexton" [facility] Transitions Subsequent and Final Call    Subsequent and Final Calls  Do you have any ongoing symptoms?: Yes  Patient-reported symptoms: Shortness of Breath,  Dry Cough  -patient reports shortness of breath with exertion; utilizes oxygen 2. 5LNC with activity, SpO2 95%-99%   -ongoing slight swelling in ankles   -denies any worsening in reported symptoms  Do you have any questions related to your medications?: No  Do you have any needs or concerns that I can assist you with?: No  Identified Barriers: None  Care Transitions Interventions  No Identified Needs        Patient is pleasant in conversation.       -Patient reports he is feeling a little down today following receiving COVID-19 vaccine on 2021.       -reports maintaining weight at 233#-235#  -denies fever, chills, or chest discomfort   -reports good appetite with normal bladder/bowel elimination   -independent with ambulation; denies recent fall   -utilizes CPAP @hs    Emotional support provided; will continue to follow.      Follow Up  Future Appointments   Date Time Provider Maximo Canales   2021 11:45 AM MD SONIA Song [facility] Ledezma RN

## 2021-03-10 ENCOUNTER — CARE COORDINATION (OUTPATIENT)
Dept: CASE MANAGEMENT | Age: 86
End: 2021-03-10

## 2021-03-10 NOTE — CARE COORDINATION
[facility] Transitions Follow Up Call    3/10/2021    Patient: Nataliia Hernandez  Patient : 1934   MRN: [id number]  Reason for Admission:   Discharge Date: 20 RARS: Readmission Risk Score: 21         Spoke with: PatientKatya    Care Transitions Subsequent and Final Call    Subsequent and Final Calls  Do you have any ongoing symptoms?: Yes  Patient-reported symptoms: Shortness of Breath, Cough  -utilizes oxygen at @ 2.5 LNC @ hs   -reports SpO2 94%-95% RA   -reports productive cough with clear mucus  -ongoing slight swelling in ankles   -reports occasionally lightheaded in the morning  -denies any worsening in reported symptoms  Do you have any questions related to your medications?: No  Do you have any needs or concerns that I can assist you with?: No  Identified Barriers: None  Care Transitions Interventions    Patient is pleasant in conversation and reports he is doing well.   -no longer utilizing oxygen during the day   -denies fever, chills, or chest discomfort   -reports good appetite  -urinating without difficulty; clear/yellow urine   -regular bowel patterns  -current weight 239#  -CPAP @ hs  -independent with ambulation; denies recent fall     Emotional support provided; discussed final call. No further outreach.        Follow Up  Future Appointments   Date Time Provider Maximo Canales   2021 11:45 AM MD [facility] Salas [facility]       Millicent Moncada RN

## 2021-04-07 PROBLEM — R09.02 HYPOXIA: Status: ACTIVE | Noted: 2021-04-07

## 2021-10-07 PROBLEM — G47.33 OBSTRUCTIVE SLEEP APNEA SYNDROME: Status: ACTIVE | Noted: 2021-10-07

## 2023-08-15 NOTE — PROGRESS NOTES
Held lasix and diovan for bp 97/51 & 95/56. S/p I&D by ED. no wound culture sent. No drainage at this time during my evaluation  -doxycycline 100mg BID  -mupirocin  -advised to return to ED if worsens or not improved in the next several days  -soak with warm water

## 2023-09-30 ENCOUNTER — HOSPITAL ENCOUNTER (INPATIENT)
Age: 88
LOS: 3 days | Discharge: NURSING, REHAB OR LONG-TERM CARE | DRG: 522 | End: 2023-10-03
Attending: EMERGENCY MEDICINE | Admitting: INTERNAL MEDICINE
Payer: MEDICARE

## 2023-09-30 ENCOUNTER — APPOINTMENT (OUTPATIENT)
Dept: GENERAL RADIOLOGY | Age: 88
DRG: 522 | End: 2023-09-30
Payer: MEDICARE

## 2023-09-30 DIAGNOSIS — G89.18 POST-OP PAIN: ICD-10-CM

## 2023-09-30 DIAGNOSIS — S72.001A CLOSED FRACTURE OF RIGHT HIP, INITIAL ENCOUNTER (HCC): Primary | ICD-10-CM

## 2023-09-30 LAB
ANION GAP SERPL CALCULATED.3IONS-SCNC: 15 MMOL/L (ref 7–16)
BUN SERPL-MCNC: 22 MG/DL (ref 6–23)
CALCIUM SERPL-MCNC: 9.4 MG/DL (ref 8.6–10.2)
CHLORIDE SERPL-SCNC: 101 MMOL/L (ref 98–107)
CO2 SERPL-SCNC: 23 MMOL/L (ref 22–29)
CREAT SERPL-MCNC: 1.4 MG/DL (ref 0.7–1.2)
ERYTHROCYTE [DISTWIDTH] IN BLOOD BY AUTOMATED COUNT: 13.1 % (ref 11.5–15)
GFR SERPL CREATININE-BSD FRML MDRD: 49 ML/MIN/1.73M2
GLUCOSE SERPL-MCNC: 122 MG/DL (ref 74–99)
HCT VFR BLD AUTO: 44.8 % (ref 37–54)
HGB BLD-MCNC: 14.4 G/DL (ref 12.5–16.5)
MCH RBC QN AUTO: 30.2 PG (ref 26–35)
MCHC RBC AUTO-ENTMCNC: 32.1 G/DL (ref 32–34.5)
MCV RBC AUTO: 93.9 FL (ref 80–99.9)
PLATELET # BLD AUTO: 113 K/UL (ref 130–450)
PMV BLD AUTO: 11.8 FL (ref 7–12)
POTASSIUM SERPL-SCNC: 4 MMOL/L (ref 3.5–5)
RBC # BLD AUTO: 4.77 M/UL (ref 3.8–5.8)
SODIUM SERPL-SCNC: 139 MMOL/L (ref 132–146)
WBC OTHER # BLD: 13.2 K/UL (ref 4.5–11.5)

## 2023-09-30 PROCEDURE — 73502 X-RAY EXAM HIP UNI 2-3 VIEWS: CPT

## 2023-09-30 PROCEDURE — 96374 THER/PROPH/DIAG INJ IV PUSH: CPT

## 2023-09-30 PROCEDURE — 6360000002 HC RX W HCPCS: Performed by: EMERGENCY MEDICINE

## 2023-09-30 PROCEDURE — 96376 TX/PRO/DX INJ SAME DRUG ADON: CPT

## 2023-09-30 PROCEDURE — 1200000000 HC SEMI PRIVATE

## 2023-09-30 PROCEDURE — 80048 BASIC METABOLIC PNL TOTAL CA: CPT

## 2023-09-30 PROCEDURE — 71045 X-RAY EXAM CHEST 1 VIEW: CPT

## 2023-09-30 PROCEDURE — 93005 ELECTROCARDIOGRAM TRACING: CPT | Performed by: EMERGENCY MEDICINE

## 2023-09-30 PROCEDURE — 85027 COMPLETE CBC AUTOMATED: CPT

## 2023-09-30 PROCEDURE — 99285 EMERGENCY DEPT VISIT HI MDM: CPT

## 2023-09-30 PROCEDURE — 2580000003 HC RX 258: Performed by: INTERNAL MEDICINE

## 2023-09-30 RX ORDER — ALBUTEROL SULFATE 2.5 MG/3ML
2.5 SOLUTION RESPIRATORY (INHALATION) EVERY 4 HOURS PRN
Status: DISCONTINUED | OUTPATIENT
Start: 2023-09-30 | End: 2023-10-03 | Stop reason: HOSPADM

## 2023-09-30 RX ORDER — ATORVASTATIN CALCIUM 10 MG/1
10 TABLET, FILM COATED ORAL DAILY
COMMUNITY

## 2023-09-30 RX ORDER — SODIUM CHLORIDE 9 MG/ML
INJECTION, SOLUTION INTRAVENOUS PRN
Status: DISCONTINUED | OUTPATIENT
Start: 2023-09-30 | End: 2023-10-03 | Stop reason: HOSPADM

## 2023-09-30 RX ORDER — SODIUM CHLORIDE 0.9 % (FLUSH) 0.9 %
10 SYRINGE (ML) INJECTION EVERY 12 HOURS SCHEDULED
Status: DISCONTINUED | OUTPATIENT
Start: 2023-09-30 | End: 2023-10-03 | Stop reason: HOSPADM

## 2023-09-30 RX ORDER — SODIUM CHLORIDE 0.9 % (FLUSH) 0.9 %
10 SYRINGE (ML) INJECTION PRN
Status: DISCONTINUED | OUTPATIENT
Start: 2023-09-30 | End: 2023-10-03 | Stop reason: HOSPADM

## 2023-09-30 RX ORDER — ACETAMINOPHEN 325 MG/1
650 TABLET ORAL EVERY 6 HOURS PRN
Status: DISCONTINUED | OUTPATIENT
Start: 2023-09-30 | End: 2023-10-01

## 2023-09-30 RX ORDER — LOSARTAN POTASSIUM 100 MG/1
100 TABLET ORAL DAILY
Status: ON HOLD | COMMUNITY
End: 2023-10-03 | Stop reason: HOSPADM

## 2023-09-30 RX ORDER — FENTANYL CITRATE 50 UG/ML
50 INJECTION, SOLUTION INTRAMUSCULAR; INTRAVENOUS ONCE
Status: COMPLETED | OUTPATIENT
Start: 2023-09-30 | End: 2023-09-30

## 2023-09-30 RX ORDER — MORPHINE SULFATE 2 MG/ML
1 INJECTION, SOLUTION INTRAMUSCULAR; INTRAVENOUS EVERY 4 HOURS PRN
Status: DISCONTINUED | OUTPATIENT
Start: 2023-09-30 | End: 2023-10-01

## 2023-09-30 RX ORDER — MULTIVIT-MIN/IRON/FOLIC ACID/K 18-600-40
CAPSULE ORAL
COMMUNITY

## 2023-09-30 RX ORDER — HYDROCODONE BITARTRATE AND ACETAMINOPHEN 5; 325 MG/1; MG/1
1 TABLET ORAL EVERY 6 HOURS PRN
Status: DISCONTINUED | OUTPATIENT
Start: 2023-09-30 | End: 2023-10-01

## 2023-09-30 RX ORDER — VALSARTAN 320 MG/1
320 TABLET ORAL DAILY
Status: DISCONTINUED | OUTPATIENT
Start: 2023-10-01 | End: 2023-10-01

## 2023-09-30 RX ORDER — ACETAMINOPHEN 650 MG/1
650 SUPPOSITORY RECTAL EVERY 6 HOURS PRN
Status: DISCONTINUED | OUTPATIENT
Start: 2023-09-30 | End: 2023-10-01

## 2023-09-30 RX ORDER — HYDROCHLOROTHIAZIDE 12.5 MG/1
12.5 CAPSULE, GELATIN COATED ORAL DAILY
Status: ON HOLD | COMMUNITY
End: 2023-10-03 | Stop reason: HOSPADM

## 2023-09-30 RX ORDER — ENOXAPARIN SODIUM 100 MG/ML
30 INJECTION SUBCUTANEOUS 2 TIMES DAILY
Status: DISCONTINUED | OUTPATIENT
Start: 2023-09-30 | End: 2023-10-01

## 2023-09-30 RX ORDER — SENNOSIDES A AND B 8.6 MG/1
1 TABLET, FILM COATED ORAL DAILY PRN
Status: DISCONTINUED | OUTPATIENT
Start: 2023-09-30 | End: 2023-10-03 | Stop reason: HOSPADM

## 2023-09-30 RX ADMIN — FENTANYL CITRATE 50 MCG: 50 INJECTION INTRAMUSCULAR; INTRAVENOUS at 20:35

## 2023-09-30 RX ADMIN — SODIUM CHLORIDE, PRESERVATIVE FREE 10 ML: 5 INJECTION INTRAVENOUS at 21:54

## 2023-09-30 RX ADMIN — FENTANYL CITRATE 50 MCG: 50 INJECTION INTRAMUSCULAR; INTRAVENOUS at 18:44

## 2023-09-30 ASSESSMENT — PAIN DESCRIPTION - DESCRIPTORS
DESCRIPTORS: ACHING
DESCRIPTORS: ACHING

## 2023-09-30 ASSESSMENT — LIFESTYLE VARIABLES
HOW OFTEN DO YOU HAVE A DRINK CONTAINING ALCOHOL: NEVER
HOW MANY STANDARD DRINKS CONTAINING ALCOHOL DO YOU HAVE ON A TYPICAL DAY: PATIENT DOES NOT DRINK

## 2023-09-30 ASSESSMENT — PATIENT HEALTH QUESTIONNAIRE - PHQ9
SUM OF ALL RESPONSES TO PHQ QUESTIONS 1-9: 0
SUM OF ALL RESPONSES TO PHQ QUESTIONS 1-9: 0
1. LITTLE INTEREST OR PLEASURE IN DOING THINGS: 0
SUM OF ALL RESPONSES TO PHQ QUESTIONS 1-9: 0
SUM OF ALL RESPONSES TO PHQ QUESTIONS 1-9: 0
SUM OF ALL RESPONSES TO PHQ9 QUESTIONS 1 & 2: 0
2. FEELING DOWN, DEPRESSED OR HOPELESS: 0

## 2023-09-30 ASSESSMENT — PAIN SCALES - GENERAL
PAINLEVEL_OUTOF10: 3
PAINLEVEL_OUTOF10: 3
PAINLEVEL_OUTOF10: 0
PAINLEVEL_OUTOF10: 1

## 2023-09-30 ASSESSMENT — PAIN - FUNCTIONAL ASSESSMENT
PAIN_FUNCTIONAL_ASSESSMENT: PREVENTS OR INTERFERES WITH ALL ACTIVE AND SOME PASSIVE ACTIVITIES
PAIN_FUNCTIONAL_ASSESSMENT: PREVENTS OR INTERFERES SOME ACTIVE ACTIVITIES AND ADLS
PAIN_FUNCTIONAL_ASSESSMENT: PREVENTS OR INTERFERES WITH MANY ACTIVE NOT PASSIVE ACTIVITIES
PAIN_FUNCTIONAL_ASSESSMENT: 0-10

## 2023-09-30 ASSESSMENT — PAIN DESCRIPTION - ORIENTATION
ORIENTATION: RIGHT

## 2023-09-30 ASSESSMENT — PAIN DESCRIPTION - LOCATION
LOCATION: HIP
LOCATION: HIP
LOCATION: LEG

## 2023-09-30 ASSESSMENT — PAIN DESCRIPTION - FREQUENCY: FREQUENCY: CONTINUOUS

## 2023-09-30 ASSESSMENT — PAIN DESCRIPTION - PAIN TYPE: TYPE: ACUTE PAIN

## 2023-10-01 ENCOUNTER — ANESTHESIA EVENT (OUTPATIENT)
Dept: OPERATING ROOM | Age: 88
End: 2023-10-01
Payer: MEDICARE

## 2023-10-01 ENCOUNTER — ANESTHESIA (OUTPATIENT)
Dept: OPERATING ROOM | Age: 88
End: 2023-10-01
Payer: MEDICARE

## 2023-10-01 ENCOUNTER — APPOINTMENT (OUTPATIENT)
Dept: GENERAL RADIOLOGY | Age: 88
DRG: 522 | End: 2023-10-01
Payer: MEDICARE

## 2023-10-01 LAB
ALBUMIN SERPL-MCNC: 3.6 G/DL (ref 3.5–5.2)
ALP SERPL-CCNC: 54 U/L (ref 40–129)
ALT SERPL-CCNC: 21 U/L (ref 0–40)
ANION GAP SERPL CALCULATED.3IONS-SCNC: 11 MMOL/L (ref 7–16)
AST SERPL-CCNC: 25 U/L (ref 0–39)
BASOPHILS # BLD: 0.03 K/UL (ref 0–0.2)
BASOPHILS NFR BLD: 0 % (ref 0–2)
BILIRUB SERPL-MCNC: 1 MG/DL (ref 0–1.2)
BUN SERPL-MCNC: 21 MG/DL (ref 6–23)
CALCIUM SERPL-MCNC: 8.9 MG/DL (ref 8.6–10.2)
CHLORIDE SERPL-SCNC: 101 MMOL/L (ref 98–107)
CK SERPL-CCNC: 342 U/L (ref 20–200)
CO2 SERPL-SCNC: 25 MMOL/L (ref 22–29)
CREAT SERPL-MCNC: 1.5 MG/DL (ref 0.7–1.2)
CRP SERPL HS-MCNC: 19 MG/L (ref 0–5)
EKG ATRIAL RATE: 93 BPM
EKG P AXIS: 44 DEGREES
EKG P-R INTERVAL: 208 MS
EKG Q-T INTERVAL: 368 MS
EKG QRS DURATION: 100 MS
EKG QTC CALCULATION (BAZETT): 457 MS
EKG R AXIS: -36 DEGREES
EKG T AXIS: 70 DEGREES
EKG VENTRICULAR RATE: 93 BPM
EOSINOPHIL # BLD: 0.03 K/UL (ref 0.05–0.5)
EOSINOPHILS RELATIVE PERCENT: 0 % (ref 0–6)
ERYTHROCYTE [DISTWIDTH] IN BLOOD BY AUTOMATED COUNT: 13.2 % (ref 11.5–15)
GFR SERPL CREATININE-BSD FRML MDRD: 44 ML/MIN/1.73M2
GLUCOSE SERPL-MCNC: 145 MG/DL (ref 74–99)
HCT VFR BLD AUTO: 41.4 % (ref 37–54)
HGB BLD-MCNC: 13.5 G/DL (ref 12.5–16.5)
IMM GRANULOCYTES # BLD AUTO: 0.04 K/UL (ref 0–0.58)
IMM GRANULOCYTES NFR BLD: 0 % (ref 0–5)
LYMPHOCYTES NFR BLD: 2.54 K/UL (ref 1.5–4)
LYMPHOCYTES RELATIVE PERCENT: 27 % (ref 20–42)
MCH RBC QN AUTO: 29.7 PG (ref 26–35)
MCHC RBC AUTO-ENTMCNC: 32.6 G/DL (ref 32–34.5)
MCV RBC AUTO: 91.2 FL (ref 80–99.9)
MONOCYTES NFR BLD: 1.08 K/UL (ref 0.1–0.95)
MONOCYTES NFR BLD: 11 % (ref 2–12)
NEUTROPHILS NFR BLD: 61 % (ref 43–80)
NEUTS SEG NFR BLD: 5.84 K/UL (ref 1.8–7.3)
PLATELET # BLD AUTO: 141 K/UL (ref 130–450)
PMV BLD AUTO: 10.9 FL (ref 7–12)
POTASSIUM SERPL-SCNC: 4 MMOL/L (ref 3.5–5)
PROT SERPL-MCNC: 6.2 G/DL (ref 6.4–8.3)
RBC # BLD AUTO: 4.54 M/UL (ref 3.8–5.8)
SODIUM SERPL-SCNC: 137 MMOL/L (ref 132–146)
WBC OTHER # BLD: 9.6 K/UL (ref 4.5–11.5)

## 2023-10-01 PROCEDURE — C1776 JOINT DEVICE (IMPLANTABLE): HCPCS | Performed by: ORTHOPAEDIC SURGERY

## 2023-10-01 PROCEDURE — 3700000001 HC ADD 15 MINUTES (ANESTHESIA): Performed by: ORTHOPAEDIC SURGERY

## 2023-10-01 PROCEDURE — 87081 CULTURE SCREEN ONLY: CPT

## 2023-10-01 PROCEDURE — A4216 STERILE WATER/SALINE, 10 ML: HCPCS | Performed by: ORTHOPAEDIC SURGERY

## 2023-10-01 PROCEDURE — 73502 X-RAY EXAM HIP UNI 2-3 VIEWS: CPT

## 2023-10-01 PROCEDURE — 27236 TREAT THIGH FRACTURE: CPT | Performed by: ORTHOPAEDIC SURGERY

## 2023-10-01 PROCEDURE — 6370000000 HC RX 637 (ALT 250 FOR IP)

## 2023-10-01 PROCEDURE — 6370000000 HC RX 637 (ALT 250 FOR IP): Performed by: INTERNAL MEDICINE

## 2023-10-01 PROCEDURE — 82550 ASSAY OF CK (CPK): CPT

## 2023-10-01 PROCEDURE — 2580000003 HC RX 258

## 2023-10-01 PROCEDURE — 6360000002 HC RX W HCPCS: Performed by: NURSE ANESTHETIST, CERTIFIED REGISTERED

## 2023-10-01 PROCEDURE — 72170 X-RAY EXAM OF PELVIS: CPT

## 2023-10-01 PROCEDURE — 3600000004 HC SURGERY LEVEL 4 BASE: Performed by: ORTHOPAEDIC SURGERY

## 2023-10-01 PROCEDURE — 93010 ELECTROCARDIOGRAM REPORT: CPT | Performed by: INTERNAL MEDICINE

## 2023-10-01 PROCEDURE — 2709999900 HC NON-CHARGEABLE SUPPLY: Performed by: ORTHOPAEDIC SURGERY

## 2023-10-01 PROCEDURE — 7100000000 HC PACU RECOVERY - FIRST 15 MIN: Performed by: ORTHOPAEDIC SURGERY

## 2023-10-01 PROCEDURE — 6360000002 HC RX W HCPCS: Performed by: INTERNAL MEDICINE

## 2023-10-01 PROCEDURE — 2580000003 HC RX 258: Performed by: NURSE ANESTHETIST, CERTIFIED REGISTERED

## 2023-10-01 PROCEDURE — 88305 TISSUE EXAM BY PATHOLOGIST: CPT

## 2023-10-01 PROCEDURE — 2700000000 HC OXYGEN THERAPY PER DAY

## 2023-10-01 PROCEDURE — 6370000000 HC RX 637 (ALT 250 FOR IP): Performed by: ORTHOPAEDIC SURGERY

## 2023-10-01 PROCEDURE — 88311 DECALCIFY TISSUE: CPT

## 2023-10-01 PROCEDURE — 3600000014 HC SURGERY LEVEL 4 ADDTL 15MIN: Performed by: ORTHOPAEDIC SURGERY

## 2023-10-01 PROCEDURE — 2500000003 HC RX 250 WO HCPCS: Performed by: NURSE ANESTHETIST, CERTIFIED REGISTERED

## 2023-10-01 PROCEDURE — 2580000003 HC RX 258: Performed by: INTERNAL MEDICINE

## 2023-10-01 PROCEDURE — 2580000003 HC RX 258: Performed by: ORTHOPAEDIC SURGERY

## 2023-10-01 PROCEDURE — 80053 COMPREHEN METABOLIC PANEL: CPT

## 2023-10-01 PROCEDURE — 1200000000 HC SEMI PRIVATE

## 2023-10-01 PROCEDURE — 2500000003 HC RX 250 WO HCPCS: Performed by: ORTHOPAEDIC SURGERY

## 2023-10-01 PROCEDURE — 3700000000 HC ANESTHESIA ATTENDED CARE: Performed by: ORTHOPAEDIC SURGERY

## 2023-10-01 PROCEDURE — 85025 COMPLETE CBC W/AUTO DIFF WBC: CPT

## 2023-10-01 PROCEDURE — 6360000002 HC RX W HCPCS

## 2023-10-01 PROCEDURE — 0SRR0JA REPLACEMENT OF RIGHT HIP JOINT, FEMORAL SURFACE WITH SYNTHETIC SUBSTITUTE, UNCEMENTED, OPEN APPROACH: ICD-10-PCS | Performed by: ORTHOPAEDIC SURGERY

## 2023-10-01 PROCEDURE — 2060000000 HC ICU INTERMEDIATE R&B

## 2023-10-01 PROCEDURE — 6360000002 HC RX W HCPCS: Performed by: ORTHOPAEDIC SURGERY

## 2023-10-01 PROCEDURE — 86140 C-REACTIVE PROTEIN: CPT

## 2023-10-01 PROCEDURE — 99222 1ST HOSP IP/OBS MODERATE 55: CPT | Performed by: ORTHOPAEDIC SURGERY

## 2023-10-01 PROCEDURE — 7100000001 HC PACU RECOVERY - ADDTL 15 MIN: Performed by: ORTHOPAEDIC SURGERY

## 2023-10-01 DEVICE — BIPOLAR COMPONENT
Type: IMPLANTABLE DEVICE | Site: HIP | Status: FUNCTIONAL
Brand: UHR

## 2023-10-01 DEVICE — 127 DEGREE NECK ANGLE HIP STEM
Type: IMPLANTABLE DEVICE | Site: HIP | Status: FUNCTIONAL
Brand: ACCOLADE

## 2023-10-01 DEVICE — LFIT V40 FEMORAL HEAD
Type: IMPLANTABLE DEVICE | Site: HIP | Status: FUNCTIONAL
Brand: V40 HEAD

## 2023-10-01 RX ORDER — PROPOFOL 10 MG/ML
INJECTION, EMULSION INTRAVENOUS PRN
Status: DISCONTINUED | OUTPATIENT
Start: 2023-10-01 | End: 2023-10-01 | Stop reason: SDUPTHER

## 2023-10-01 RX ORDER — ROCURONIUM BROMIDE 10 MG/ML
INJECTION, SOLUTION INTRAVENOUS PRN
Status: DISCONTINUED | OUTPATIENT
Start: 2023-10-01 | End: 2023-10-01 | Stop reason: SDUPTHER

## 2023-10-01 RX ORDER — OXYCODONE HYDROCHLORIDE AND ACETAMINOPHEN 5; 325 MG/1; MG/1
1 TABLET ORAL EVERY 6 HOURS PRN
Qty: 28 TABLET | Refills: 0 | Status: SHIPPED | OUTPATIENT
Start: 2023-10-01 | End: 2023-10-08

## 2023-10-01 RX ORDER — ONDANSETRON 4 MG/1
4 TABLET, ORALLY DISINTEGRATING ORAL EVERY 8 HOURS PRN
Status: DISCONTINUED | OUTPATIENT
Start: 2023-10-01 | End: 2023-10-03 | Stop reason: HOSPADM

## 2023-10-01 RX ORDER — SODIUM CHLORIDE 9 MG/ML
INJECTION, SOLUTION INTRAVENOUS PRN
Status: DISCONTINUED | OUTPATIENT
Start: 2023-10-01 | End: 2023-10-01 | Stop reason: HOSPADM

## 2023-10-01 RX ORDER — SODIUM CHLORIDE 0.9 % (FLUSH) 0.9 %
5-40 SYRINGE (ML) INJECTION EVERY 12 HOURS SCHEDULED
Status: DISCONTINUED | OUTPATIENT
Start: 2023-10-01 | End: 2023-10-01 | Stop reason: HOSPADM

## 2023-10-01 RX ORDER — FENTANYL CITRATE 50 UG/ML
50 INJECTION, SOLUTION INTRAMUSCULAR; INTRAVENOUS EVERY 5 MIN PRN
Status: DISCONTINUED | OUTPATIENT
Start: 2023-10-01 | End: 2023-10-01 | Stop reason: HOSPADM

## 2023-10-01 RX ORDER — ONDANSETRON 2 MG/ML
4 INJECTION INTRAMUSCULAR; INTRAVENOUS EVERY 6 HOURS PRN
Status: DISCONTINUED | OUTPATIENT
Start: 2023-10-01 | End: 2023-10-03 | Stop reason: HOSPADM

## 2023-10-01 RX ORDER — MORPHINE SULFATE 4 MG/ML
4 INJECTION, SOLUTION INTRAMUSCULAR; INTRAVENOUS
Status: DISCONTINUED | OUTPATIENT
Start: 2023-10-01 | End: 2023-10-03 | Stop reason: HOSPADM

## 2023-10-01 RX ORDER — FAMOTIDINE 10 MG/ML
INJECTION, SOLUTION INTRAVENOUS PRN
Status: DISCONTINUED | OUTPATIENT
Start: 2023-10-01 | End: 2023-10-01 | Stop reason: SDUPTHER

## 2023-10-01 RX ORDER — FENTANYL CITRATE 50 UG/ML
25 INJECTION, SOLUTION INTRAMUSCULAR; INTRAVENOUS EVERY 5 MIN PRN
Status: DISCONTINUED | OUTPATIENT
Start: 2023-10-01 | End: 2023-10-01 | Stop reason: HOSPADM

## 2023-10-01 RX ORDER — MORPHINE SULFATE 2 MG/ML
2 INJECTION, SOLUTION INTRAMUSCULAR; INTRAVENOUS
Status: DISCONTINUED | OUTPATIENT
Start: 2023-10-01 | End: 2023-10-03 | Stop reason: HOSPADM

## 2023-10-01 RX ORDER — SODIUM CHLORIDE 0.9 % (FLUSH) 0.9 %
5-40 SYRINGE (ML) INJECTION EVERY 12 HOURS SCHEDULED
Status: DISCONTINUED | OUTPATIENT
Start: 2023-10-01 | End: 2023-10-03 | Stop reason: SDUPTHER

## 2023-10-01 RX ORDER — SODIUM CHLORIDE 9 MG/ML
INJECTION, SOLUTION INTRAVENOUS PRN
Status: DISCONTINUED | OUTPATIENT
Start: 2023-10-01 | End: 2023-10-03 | Stop reason: HOSPADM

## 2023-10-01 RX ORDER — CEFAZOLIN 2 G/1
INJECTION, POWDER, FOR SOLUTION INTRAMUSCULAR; INTRAVENOUS
Status: DISPENSED
Start: 2023-10-01 | End: 2023-10-01

## 2023-10-01 RX ORDER — FAMOTIDINE 10 MG/ML
INJECTION, SOLUTION INTRAVENOUS
Status: COMPLETED
Start: 2023-10-01 | End: 2023-10-01

## 2023-10-01 RX ORDER — SODIUM CHLORIDE 9 MG/ML
INJECTION, SOLUTION INTRAVENOUS CONTINUOUS
Status: ACTIVE | OUTPATIENT
Start: 2023-10-01 | End: 2023-10-02

## 2023-10-01 RX ORDER — SODIUM CHLORIDE 9 MG/ML
INJECTION, SOLUTION INTRAVENOUS CONTINUOUS
Status: DISCONTINUED | OUTPATIENT
Start: 2023-10-01 | End: 2023-10-03 | Stop reason: HOSPADM

## 2023-10-01 RX ORDER — SODIUM CHLORIDE 9 MG/ML
INJECTION, SOLUTION INTRAVENOUS CONTINUOUS PRN
Status: DISCONTINUED | OUTPATIENT
Start: 2023-10-01 | End: 2023-10-01 | Stop reason: SDUPTHER

## 2023-10-01 RX ORDER — OXYCODONE HYDROCHLORIDE 5 MG/1
5 TABLET ORAL EVERY 4 HOURS PRN
Status: DISCONTINUED | OUTPATIENT
Start: 2023-10-01 | End: 2023-10-03 | Stop reason: HOSPADM

## 2023-10-01 RX ORDER — SODIUM CHLORIDE 0.9 % (FLUSH) 0.9 %
5-40 SYRINGE (ML) INJECTION PRN
Status: DISCONTINUED | OUTPATIENT
Start: 2023-10-01 | End: 2023-10-01 | Stop reason: HOSPADM

## 2023-10-01 RX ORDER — EPHEDRINE SULFATE/0.9% NACL/PF 50 MG/5 ML
SYRINGE (ML) INTRAVENOUS PRN
Status: DISCONTINUED | OUTPATIENT
Start: 2023-10-01 | End: 2023-10-01 | Stop reason: SDUPTHER

## 2023-10-01 RX ORDER — VANCOMYCIN HYDROCHLORIDE 1 G/20ML
INJECTION, POWDER, LYOPHILIZED, FOR SOLUTION INTRAVENOUS PRN
Status: DISCONTINUED | OUTPATIENT
Start: 2023-10-01 | End: 2023-10-01 | Stop reason: ALTCHOICE

## 2023-10-01 RX ORDER — OXYCODONE HYDROCHLORIDE 5 MG/1
10 TABLET ORAL EVERY 4 HOURS PRN
Status: DISCONTINUED | OUTPATIENT
Start: 2023-10-01 | End: 2023-10-03 | Stop reason: HOSPADM

## 2023-10-01 RX ORDER — WATER 1000 ML/1000ML
INJECTION, SOLUTION INTRAVENOUS
Status: DISPENSED
Start: 2023-10-01 | End: 2023-10-01

## 2023-10-01 RX ORDER — SODIUM CHLORIDE 0.9 % (FLUSH) 0.9 %
5-40 SYRINGE (ML) INJECTION PRN
Status: DISCONTINUED | OUTPATIENT
Start: 2023-10-01 | End: 2023-10-03 | Stop reason: HOSPADM

## 2023-10-01 RX ORDER — ASPIRIN 325 MG
325 TABLET, DELAYED RELEASE (ENTERIC COATED) ORAL 2 TIMES DAILY
Status: DISCONTINUED | OUTPATIENT
Start: 2023-10-01 | End: 2023-10-03 | Stop reason: HOSPADM

## 2023-10-01 RX ORDER — LIDOCAINE HYDROCHLORIDE 20 MG/ML
INJECTION, SOLUTION EPIDURAL; INFILTRATION; INTRACAUDAL; PERINEURAL PRN
Status: DISCONTINUED | OUTPATIENT
Start: 2023-10-01 | End: 2023-10-01 | Stop reason: SDUPTHER

## 2023-10-01 RX ORDER — LABETALOL HYDROCHLORIDE 5 MG/ML
INJECTION, SOLUTION INTRAVENOUS PRN
Status: DISCONTINUED | OUTPATIENT
Start: 2023-10-01 | End: 2023-10-01 | Stop reason: SDUPTHER

## 2023-10-01 RX ORDER — FENTANYL CITRATE 50 UG/ML
INJECTION, SOLUTION INTRAMUSCULAR; INTRAVENOUS PRN
Status: DISCONTINUED | OUTPATIENT
Start: 2023-10-01 | End: 2023-10-01 | Stop reason: SDUPTHER

## 2023-10-01 RX ORDER — ACETAMINOPHEN 325 MG/1
650 TABLET ORAL EVERY 4 HOURS PRN
Status: DISCONTINUED | OUTPATIENT
Start: 2023-10-01 | End: 2023-10-03 | Stop reason: HOSPADM

## 2023-10-01 RX ORDER — ONDANSETRON 2 MG/ML
INJECTION INTRAMUSCULAR; INTRAVENOUS PRN
Status: DISCONTINUED | OUTPATIENT
Start: 2023-10-01 | End: 2023-10-01 | Stop reason: SDUPTHER

## 2023-10-01 RX ORDER — ASPIRIN 325 MG
325 TABLET ORAL 2 TIMES DAILY
Qty: 30 TABLET | Refills: 3 | Status: SHIPPED | OUTPATIENT
Start: 2023-10-01

## 2023-10-01 RX ADMIN — ROCURONIUM BROMIDE 50 MG: 10 INJECTION, SOLUTION INTRAVENOUS at 10:11

## 2023-10-01 RX ADMIN — SUGAMMADEX 400 MG: 100 INJECTION, SOLUTION INTRAVENOUS at 11:40

## 2023-10-01 RX ADMIN — LIDOCAINE HYDROCHLORIDE 100 MG: 20 INJECTION, SOLUTION EPIDURAL; INFILTRATION; INTRACAUDAL; PERINEURAL at 10:07

## 2023-10-01 RX ADMIN — SODIUM CHLORIDE: 9 INJECTION, SOLUTION INTRAVENOUS at 13:46

## 2023-10-01 RX ADMIN — FENTANYL CITRATE 50 MCG: 50 INJECTION, SOLUTION INTRAMUSCULAR; INTRAVENOUS at 10:30

## 2023-10-01 RX ADMIN — WATER 2000 MG: 1 INJECTION INTRAMUSCULAR; INTRAVENOUS; SUBCUTANEOUS at 17:43

## 2023-10-01 RX ADMIN — ONDANSETRON 4 MG: 2 INJECTION INTRAMUSCULAR; INTRAVENOUS at 10:33

## 2023-10-01 RX ADMIN — LABETALOL HYDROCHLORIDE 10 MG: 5 INJECTION INTRAVENOUS at 10:33

## 2023-10-01 RX ADMIN — PROPOFOL 150 MG: 10 INJECTION, EMULSION INTRAVENOUS at 10:10

## 2023-10-01 RX ADMIN — ASPIRIN 325 MG: 325 TABLET, COATED ORAL at 13:47

## 2023-10-01 RX ADMIN — ACETAMINOPHEN 650 MG: 325 TABLET ORAL at 08:25

## 2023-10-01 RX ADMIN — LABETALOL HYDROCHLORIDE 5 MG: 5 INJECTION INTRAVENOUS at 11:19

## 2023-10-01 RX ADMIN — Medication 20 MG: at 11:10

## 2023-10-01 RX ADMIN — SODIUM CHLORIDE, PRESERVATIVE FREE 10 ML: 5 INJECTION INTRAVENOUS at 21:41

## 2023-10-01 RX ADMIN — Medication 20 MG: at 10:34

## 2023-10-01 RX ADMIN — PROPOFOL 50 MG: 10 INJECTION, EMULSION INTRAVENOUS at 10:29

## 2023-10-01 RX ADMIN — LABETALOL HYDROCHLORIDE 5 MG: 5 INJECTION INTRAVENOUS at 11:24

## 2023-10-01 RX ADMIN — SODIUM CHLORIDE: 9 INJECTION, SOLUTION INTRAVENOUS at 09:43

## 2023-10-01 RX ADMIN — ROCURONIUM BROMIDE 20 MG: 10 INJECTION, SOLUTION INTRAVENOUS at 10:54

## 2023-10-01 RX ADMIN — MORPHINE SULFATE 1 MG: 2 INJECTION, SOLUTION INTRAMUSCULAR; INTRAVENOUS at 02:35

## 2023-10-01 RX ADMIN — ASPIRIN 325 MG: 325 TABLET, COATED ORAL at 21:37

## 2023-10-01 RX ADMIN — FENTANYL CITRATE 50 MCG: 50 INJECTION, SOLUTION INTRAMUSCULAR; INTRAVENOUS at 10:06

## 2023-10-01 RX ADMIN — SODIUM CHLORIDE: 9 INJECTION, SOLUTION INTRAVENOUS at 06:25

## 2023-10-01 ASSESSMENT — PAIN DESCRIPTION - ORIENTATION
ORIENTATION: RIGHT
ORIENTATION: RIGHT

## 2023-10-01 ASSESSMENT — ENCOUNTER SYMPTOMS
PHOTOPHOBIA: 0
NAUSEA: 0
DIARRHEA: 0
VOMITING: 0
COUGH: 0
WHEEZING: 0
ABDOMINAL PAIN: 0
SHORTNESS OF BREATH: 0
COLOR CHANGE: 0
CONSTIPATION: 0

## 2023-10-01 ASSESSMENT — PAIN SCALES - GENERAL
PAINLEVEL_OUTOF10: 0
PAINLEVEL_OUTOF10: 4
PAINLEVEL_OUTOF10: 5

## 2023-10-01 ASSESSMENT — PAIN DESCRIPTION - LOCATION
LOCATION: HIP
LOCATION: HIP
LOCATION: BACK

## 2023-10-01 ASSESSMENT — PAIN DESCRIPTION - DESCRIPTORS: DESCRIPTORS: ACHING;DISCOMFORT

## 2023-10-01 NOTE — OP NOTE
signed by   Leonardo Palmer DO  10/1/2023     NOTE: This report was transcribed using voice recognition software.  Every effort was made to ensure accuracy; however, inadvertent computerized transcription errors may be present

## 2023-10-01 NOTE — PLAN OF CARE
Problem: Discharge Planning  Goal: Discharge to home or other facility with appropriate resources  10/1/2023 1027 by Frank Moralez RN  Outcome: Progressing  9/30/2023 2226 by Pau Burnett RN  Outcome: Progressing     Problem: Safety - Adult  Goal: Free from fall injury  10/1/2023 1027 by Frank Moralez RN  Outcome: Progressing  9/30/2023 2226 by Pau Burnett RN  Outcome: Progressing     Problem: Pain  Goal: Verbalizes/displays adequate comfort level or baseline comfort level  10/1/2023 1027 by Frank Moralez RN  Outcome: Progressing  9/30/2023 2226 by Pau Burnett RN  Outcome: Progressing     Problem: ABCDS Injury Assessment  Goal: Absence of physical injury  10/1/2023 1027 by Frakn Moralez RN  Outcome: Progressing  9/30/2023 2226 by Pau Burnett RN  Outcome: Progressing

## 2023-10-01 NOTE — H&P
intrathoracic, HENT, orthopedic, or carotid endarterectomy, etc.)  Revised Cardiac Risk Index Risk factors: None  Measurement of Exercise Tolerance before Surgery >4 Yes    According to the 2014 ACC/AHA pre-operative risk assessment guidelines Hernesto Ardon is a low risk for major cardiac complications during a intermediate risk procedure and may continue as planned. Specific medication recommendations are listed below. Medications recommended to continue should be taken with a sip of water even when NPO. Further recommendations from consultants: None    Medication Recommendations:  Hold HCTZ and ARB due to LAEX. IVF. Principal Problem:    Hip fracture requiring operative repair, right, closed, initial encounter [facility])  Active Problems:    Closed fracture of neck of right femur, initial encounter ([address])  Resolved Problems:    * No resolved hospital problems. *  Right femoral neck fracture  Fall  ALEX  Hypertension  HLD    Plan:  Admit to inpatient. Orthopedics consulted. Bed rest. NPO for possible surgical intervention. IVF. Check a CK level.      DVT Prophylaxis: Lovenox  Code Status: Full     Mary Toscano DO    Electronically signed by Mary Toscano DO on 10/1/2023 at 7:08 AM

## 2023-10-01 NOTE — CONSULTS
Orthopedic Surgery    Orthopedics consulted for right hip fracture. Current imaging demonstrates right femoral neck fracture, transcervical, with displacement. We will plan for tentative OR Sunday, 10/2/2023 for right hip hemiarthroplasty. Medical services for admission and surgical optimization. Maintain bedrest.  Maintain n.p.o. Full consult note to follow.     Electronically Signed By  Ahsan Guadalupe D.O.  10/1/2023  12:31 AM

## 2023-10-01 NOTE — CONSULTS
Orthopaedic H&P Note        Kim Guillen is a 80 y.o. male, his YOB: 1934 with the following history as recorded in [facility]:    Patient Active Problem List    Diagnosis Date Noted    Hip fracture requiring operative repair, right, closed, initial encounter ([address]) 09/30/2023    Closed fracture of neck of right femur, initial encounter ([address]) 09/30/2023    Obstructive sleep apnea syndrome 10/07/2021    Hypoxia 04/07/2021    Pneumonia due to COVID-19 virus 11/27/2020    Appendicitis 12/04/2018    Impaired fasting glucose     Osteoarthrosis involving multiple sites     Chronic kidney disease (CKD), stage III (moderate) (HCC)     Benign essential hypertension     Basal cell carcinoma of skin      Current Facility-Administered Medications   Medication Dose Route Frequency Provider Last Rate Last Admin    0.9 % sodium chloride infusion   IntraVENous Continuous Monika E Randy, DO 75 mL/hr at 10/01/23 0625 New Bag at 10/01/23 2084    sodium chloride flush 0.9 % injection 10 mL  10 mL IntraVENous 2 times per day Monika E Randy, DO   10 mL at 09/30/23 2154    sodium chloride flush 0.9 % injection 10 mL  10 mL IntraVENous PRN Monika E Randy, DO        0.9 % sodium chloride infusion   IntraVENous PRN Monika E Randy, DO        enoxaparin Sodium (LOVENOX) injection 30 mg  30 mg SubCUTAneous BID Monika E Randy, DO        senna (SENOKOT) tablet 8.6 mg  1 tablet Oral Daily PRN Monika E Randy, DO        acetaminophen (TYLENOL) tablet 650 mg  650 mg Oral Q6H PRN Monika E Randy, DO        Or    acetaminophen (TYLENOL) suppository 650 mg  650 mg Rectal Q6H PRN Monika E Randy, DO        albuterol (PROVENTIL) (2.5 MG/3ML) 0.083% nebulizer solution 2.5 mg  2.5 mg Nebulization Q4H PRN Monika E Randy, DO        HYDROcodone-acetaminophen (NORCO) 5-325 MG per tablet 1 tablet  1 tablet Oral Q6H PRN Monika E Randy, DO        morphine (PF) injection 1 mg  1 mg IntraVENous Q4H PRN Monika E Randy, DO   1

## 2023-10-02 LAB
ALBUMIN SERPL-MCNC: 3.2 G/DL (ref 3.5–5.2)
ALP SERPL-CCNC: 47 U/L (ref 40–129)
ALT SERPL-CCNC: 14 U/L (ref 0–40)
ANION GAP SERPL CALCULATED.3IONS-SCNC: 13 MMOL/L (ref 7–16)
AST SERPL-CCNC: 27 U/L (ref 0–39)
BASOPHILS # BLD: 0.04 K/UL (ref 0–0.2)
BASOPHILS NFR BLD: 0 % (ref 0–2)
BILIRUB SERPL-MCNC: 0.7 MG/DL (ref 0–1.2)
BUN SERPL-MCNC: 27 MG/DL (ref 6–23)
CALCIUM SERPL-MCNC: 8 MG/DL (ref 8.6–10.2)
CHLORIDE SERPL-SCNC: 99 MMOL/L (ref 98–107)
CO2 SERPL-SCNC: 23 MMOL/L (ref 22–29)
CREAT SERPL-MCNC: 1.8 MG/DL (ref 0.7–1.2)
EOSINOPHIL # BLD: 0.12 K/UL (ref 0.05–0.5)
EOSINOPHILS RELATIVE PERCENT: 1 % (ref 0–6)
ERYTHROCYTE [DISTWIDTH] IN BLOOD BY AUTOMATED COUNT: 13.2 % (ref 11.5–15)
GFR SERPL CREATININE-BSD FRML MDRD: 37 ML/MIN/1.73M2
GLUCOSE SERPL-MCNC: 186 MG/DL (ref 74–99)
HCT VFR BLD AUTO: 36.9 % (ref 37–54)
HGB BLD-MCNC: 11.9 G/DL (ref 12.5–16.5)
IMM GRANULOCYTES # BLD AUTO: 0.06 K/UL (ref 0–0.58)
IMM GRANULOCYTES NFR BLD: 1 % (ref 0–5)
LYMPHOCYTES NFR BLD: 2.91 K/UL (ref 1.5–4)
LYMPHOCYTES RELATIVE PERCENT: 26 % (ref 20–42)
MCH RBC QN AUTO: 30.3 PG (ref 26–35)
MCHC RBC AUTO-ENTMCNC: 32.2 G/DL (ref 32–34.5)
MCV RBC AUTO: 93.9 FL (ref 80–99.9)
MONOCYTES NFR BLD: 1.14 K/UL (ref 0.1–0.95)
MONOCYTES NFR BLD: 10 % (ref 2–12)
NEUTROPHILS NFR BLD: 62 % (ref 43–80)
NEUTS SEG NFR BLD: 6.91 K/UL (ref 1.8–7.3)
PLATELET, FLUORESCENCE: 120 K/UL (ref 130–450)
PMV BLD AUTO: 10.9 FL (ref 7–12)
POTASSIUM SERPL-SCNC: 3.7 MMOL/L (ref 3.5–5)
PROT SERPL-MCNC: 5.6 G/DL (ref 6.4–8.3)
RBC # BLD AUTO: 3.93 M/UL (ref 3.8–5.8)
SODIUM SERPL-SCNC: 135 MMOL/L (ref 132–146)
WBC OTHER # BLD: 11.2 K/UL (ref 4.5–11.5)

## 2023-10-02 PROCEDURE — 97530 THERAPEUTIC ACTIVITIES: CPT

## 2023-10-02 PROCEDURE — 2580000003 HC RX 258

## 2023-10-02 PROCEDURE — 97165 OT EVAL LOW COMPLEX 30 MIN: CPT

## 2023-10-02 PROCEDURE — 85025 COMPLETE CBC W/AUTO DIFF WBC: CPT

## 2023-10-02 PROCEDURE — 6360000002 HC RX W HCPCS

## 2023-10-02 PROCEDURE — 97161 PT EVAL LOW COMPLEX 20 MIN: CPT

## 2023-10-02 PROCEDURE — 51798 US URINE CAPACITY MEASURE: CPT

## 2023-10-02 PROCEDURE — 80053 COMPREHEN METABOLIC PANEL: CPT

## 2023-10-02 PROCEDURE — 1200000000 HC SEMI PRIVATE

## 2023-10-02 PROCEDURE — 6370000000 HC RX 637 (ALT 250 FOR IP)

## 2023-10-02 RX ADMIN — SODIUM CHLORIDE, PRESERVATIVE FREE 10 ML: 5 INJECTION INTRAVENOUS at 09:50

## 2023-10-02 RX ADMIN — Medication 10 ML: at 09:42

## 2023-10-02 RX ADMIN — SODIUM CHLORIDE: 9 INJECTION, SOLUTION INTRAVENOUS at 21:20

## 2023-10-02 RX ADMIN — OXYCODONE HYDROCHLORIDE 5 MG: 5 TABLET ORAL at 21:13

## 2023-10-02 RX ADMIN — OXYCODONE HYDROCHLORIDE 5 MG: 5 TABLET ORAL at 09:50

## 2023-10-02 RX ADMIN — OXYCODONE HYDROCHLORIDE 5 MG: 5 TABLET ORAL at 02:34

## 2023-10-02 RX ADMIN — ASPIRIN 325 MG: 325 TABLET, COATED ORAL at 21:13

## 2023-10-02 RX ADMIN — SODIUM CHLORIDE, PRESERVATIVE FREE 10 ML: 5 INJECTION INTRAVENOUS at 21:13

## 2023-10-02 RX ADMIN — ASPIRIN 325 MG: 325 TABLET, COATED ORAL at 09:50

## 2023-10-02 RX ADMIN — WATER 2000 MG: 1 INJECTION INTRAMUSCULAR; INTRAVENOUS; SUBCUTANEOUS at 02:01

## 2023-10-02 RX ADMIN — ACETAMINOPHEN 650 MG: 325 TABLET ORAL at 00:11

## 2023-10-02 ASSESSMENT — PAIN - FUNCTIONAL ASSESSMENT
PAIN_FUNCTIONAL_ASSESSMENT: PREVENTS OR INTERFERES SOME ACTIVE ACTIVITIES AND ADLS
PAIN_FUNCTIONAL_ASSESSMENT: ACTIVITIES ARE NOT PREVENTED

## 2023-10-02 ASSESSMENT — PAIN DESCRIPTION - DESCRIPTORS
DESCRIPTORS: DISCOMFORT
DESCRIPTORS: ACHING;DISCOMFORT;SORE
DESCRIPTORS: ACHING;DISCOMFORT;SORE

## 2023-10-02 ASSESSMENT — PAIN SCALES - GENERAL
PAINLEVEL_OUTOF10: 7
PAINLEVEL_OUTOF10: 4
PAINLEVEL_OUTOF10: 5
PAINLEVEL_OUTOF10: 0
PAINLEVEL_OUTOF10: 0

## 2023-10-02 ASSESSMENT — PAIN DESCRIPTION - ORIENTATION
ORIENTATION: RIGHT

## 2023-10-02 ASSESSMENT — PAIN DESCRIPTION - LOCATION
LOCATION: HIP

## 2023-10-02 NOTE — ACP (ADVANCE CARE PLANNING)
Advance Care Planning   Healthcare Decision Maker:    Primary Decision Maker: Wilfredo Karlene - Child - [phone]    Click here to complete Healthcare Decision Makers including selection of the Healthcare Decision Maker Relationship (ie \"Primary\").

## 2023-10-02 NOTE — CARE COORDINATION
Met with patient about diagnosis and discharge plan of care. POD#1 right hip hemiarthroplasty. Pt had mechanical fall at home. Lives in 2 story home. Independent prior to admit. PCP is Dr Donny Dejesus. Plan is possible JESSICA per pt choice. 1st choice) [facility]. Referral called to Daron Maynard, liaison. Await acceptance. Therapies still need to work with pt.  Will follow-mjo

## 2023-10-03 VITALS
HEART RATE: 79 BPM | DIASTOLIC BLOOD PRESSURE: 65 MMHG | HEIGHT: 70 IN | SYSTOLIC BLOOD PRESSURE: 113 MMHG | OXYGEN SATURATION: 91 % | TEMPERATURE: 98.1 F | RESPIRATION RATE: 18 BRPM | BODY MASS INDEX: 34.36 KG/M2 | WEIGHT: 240 LBS

## 2023-10-03 LAB
ALBUMIN SERPL-MCNC: 3.1 G/DL (ref 3.5–5.2)
ALP SERPL-CCNC: 46 U/L (ref 40–129)
ALT SERPL-CCNC: 8 U/L (ref 0–40)
ANION GAP SERPL CALCULATED.3IONS-SCNC: 10 MMOL/L (ref 7–16)
AST SERPL-CCNC: 25 U/L (ref 0–39)
BASOPHILS # BLD: 0.07 K/UL (ref 0–0.2)
BASOPHILS NFR BLD: 1 % (ref 0–2)
BILIRUB SERPL-MCNC: 0.9 MG/DL (ref 0–1.2)
BUN SERPL-MCNC: 26 MG/DL (ref 6–23)
CALCIUM SERPL-MCNC: 8 MG/DL (ref 8.6–10.2)
CHLORIDE SERPL-SCNC: 100 MMOL/L (ref 98–107)
CO2 SERPL-SCNC: 23 MMOL/L (ref 22–29)
CREAT SERPL-MCNC: 1.7 MG/DL (ref 0.7–1.2)
EOSINOPHIL # BLD: 0.12 K/UL (ref 0.05–0.5)
EOSINOPHILS RELATIVE PERCENT: 1 % (ref 0–6)
ERYTHROCYTE [DISTWIDTH] IN BLOOD BY AUTOMATED COUNT: 13.2 % (ref 11.5–15)
GFR SERPL CREATININE-BSD FRML MDRD: 38 ML/MIN/1.73M2
GLUCOSE SERPL-MCNC: 150 MG/DL (ref 74–99)
HCT VFR BLD AUTO: 36.7 % (ref 37–54)
HGB BLD-MCNC: 11.7 G/DL (ref 12.5–16.5)
IMM GRANULOCYTES # BLD AUTO: 0.07 K/UL (ref 0–0.58)
IMM GRANULOCYTES NFR BLD: 1 % (ref 0–5)
LYMPHOCYTES NFR BLD: 3.24 K/UL (ref 1.5–4)
LYMPHOCYTES RELATIVE PERCENT: 27 % (ref 20–42)
MCH RBC QN AUTO: 30 PG (ref 26–35)
MCHC RBC AUTO-ENTMCNC: 31.9 G/DL (ref 32–34.5)
MCV RBC AUTO: 94.1 FL (ref 80–99.9)
MICROORGANISM SPEC CULT: ABNORMAL
MONOCYTES NFR BLD: 1.35 K/UL (ref 0.1–0.95)
MONOCYTES NFR BLD: 11 % (ref 2–12)
NEUTROPHILS NFR BLD: 60 % (ref 43–80)
NEUTS SEG NFR BLD: 7.13 K/UL (ref 1.8–7.3)
PLATELET # BLD AUTO: 118 K/UL (ref 130–450)
PMV BLD AUTO: 11.2 FL (ref 7–12)
POTASSIUM SERPL-SCNC: 4 MMOL/L (ref 3.5–5)
PROT SERPL-MCNC: 5.8 G/DL (ref 6.4–8.3)
RBC # BLD AUTO: 3.9 M/UL (ref 3.8–5.8)
SODIUM SERPL-SCNC: 133 MMOL/L (ref 132–146)
SPECIMEN DESCRIPTION: ABNORMAL
WBC OTHER # BLD: 12 K/UL (ref 4.5–11.5)

## 2023-10-03 PROCEDURE — 36415 COLL VENOUS BLD VENIPUNCTURE: CPT

## 2023-10-03 PROCEDURE — 80053 COMPREHEN METABOLIC PANEL: CPT

## 2023-10-03 PROCEDURE — 85025 COMPLETE CBC W/AUTO DIFF WBC: CPT

## 2023-10-03 PROCEDURE — 6370000000 HC RX 637 (ALT 250 FOR IP)

## 2023-10-03 RX ADMIN — OXYCODONE HYDROCHLORIDE 10 MG: 5 TABLET ORAL at 08:02

## 2023-10-03 RX ADMIN — ASPIRIN 325 MG: 325 TABLET, COATED ORAL at 10:34

## 2023-10-03 RX ADMIN — ACETAMINOPHEN 650 MG: 325 TABLET ORAL at 01:57

## 2023-10-03 ASSESSMENT — PAIN DESCRIPTION - DESCRIPTORS
DESCRIPTORS: ACHING;DISCOMFORT;SORE
DESCRIPTORS: CRAMPING;DISCOMFORT

## 2023-10-03 ASSESSMENT — PAIN DESCRIPTION - ORIENTATION
ORIENTATION: RIGHT
ORIENTATION: RIGHT

## 2023-10-03 ASSESSMENT — PAIN - FUNCTIONAL ASSESSMENT: PAIN_FUNCTIONAL_ASSESSMENT: PREVENTS OR INTERFERES WITH MANY ACTIVE NOT PASSIVE ACTIVITIES

## 2023-10-03 ASSESSMENT — PAIN SCALES - GENERAL
PAINLEVEL_OUTOF10: 10
PAINLEVEL_OUTOF10: 0
PAINLEVEL_OUTOF10: 1

## 2023-10-03 ASSESSMENT — PAIN DESCRIPTION - LOCATION
LOCATION: HIP
LOCATION: HIP

## 2023-10-03 NOTE — CARE COORDINATION
Updated plan of care. POD#2. Pre-cert received from [facility]. Out of state PASSR  completed. Transport form done. TEDDY initiated. PAS.  Ambulance to  pt at 11 am. Staff, pt and facility aware of time-shailao

## 2023-10-03 NOTE — DISCHARGE INSTR - COC
Continuity of Care Form    Patient Name: Zara rGullon   :  1934  MRN:  [id number]    Admit date:  2023  Discharge date:  10/3/2023    Code Status Order: Full Code   Advance Directives:   [address] Documentation       Date/Time Healthcare Directive Type of Healthcare Directive Copy in [address] Agent's Name Healthcare Agent's Phone Number    10/01/23 0827 No, patient does not have an advance directive for healthcare treatment -- -- -- -- --            Admitting Physician:  Jonas Smith DO  PCP: Daryle Lemons, MD    Discharging Nurse: Rachel Lutz, [facility] Unit/Room#: 8810/8223-Y  Discharging Unit Phone Number: [phone]    Emergency Contact:   Extended Emergency Contact Information  Primary Emergency Contact: [address]  [place]  Home Phone: [phone]  Mobile Phone: [phone]  Relation: Child  Preferred language: English   needed? No  Secondary Emergency Contact: Oscar Schroeder[address] Phone: [phone]  Mobile Phone: [phone]  Relation: Child   needed? No    Past Surgical History:  Past Surgical History:   Procedure Laterality Date    HIP SURGERY Right 10/1/2023    RIGHT HIP HEMIARTHROPLASTY performed by Brianna Corbin DO at [address] [facility],[address] N/A 2018    LAPAROSCOPIC APPENDECTOMY performed by Julieta Polanco MD at [address] [place] [address]  2005    right upper back,        Immunization History: There is no immunization history on file for this patient.     Active Problems:  Patient Active Problem List   Diagnosis Code    Impaired fasting glucose R73.01    Osteoarthrosis involving multiple sites M15.9    Chronic kidney disease (CKD), stage III (moderate) (HCC) N18.30    Benign essential hypertension I10    Basal cell carcinoma of skin C44.91    Appendicitis K37    Pneumonia due to COVID-19 virus U07.1, J12.82

## 2023-10-04 ENCOUNTER — OUTSIDE SERVICES (OUTPATIENT)
Dept: PRIMARY CARE CLINIC | Age: 88
End: 2023-10-04
Payer: MEDICARE

## 2023-10-04 DIAGNOSIS — S72.001A CLOSED FRACTURE OF NECK OF RIGHT FEMUR, INITIAL ENCOUNTER (HCC): ICD-10-CM

## 2023-10-04 DIAGNOSIS — Z91.81 AT MAXIMUM RISK FOR FALL: ICD-10-CM

## 2023-10-04 DIAGNOSIS — G47.33 OBSTRUCTIVE SLEEP APNEA SYNDROME: ICD-10-CM

## 2023-10-04 DIAGNOSIS — R53.81 PHYSICAL DECONDITIONING: ICD-10-CM

## 2023-10-04 DIAGNOSIS — S72.001A HIP FRACTURE REQUIRING OPERATIVE REPAIR, RIGHT, CLOSED, INITIAL ENCOUNTER (HCC): Primary | ICD-10-CM

## 2023-10-04 DIAGNOSIS — Z96.641 HISTORY OF RIGHT HIP HEMIARTHROPLASTY: ICD-10-CM

## 2023-10-04 DIAGNOSIS — I10 BENIGN ESSENTIAL HYPERTENSION: ICD-10-CM

## 2023-10-04 DIAGNOSIS — N18.30 STAGE 3 CHRONIC KIDNEY DISEASE, UNSPECIFIED WHETHER STAGE 3A OR 3B CKD (HCC): ICD-10-CM

## 2023-10-04 DIAGNOSIS — R53.1 GENERALIZED WEAKNESS: ICD-10-CM

## 2023-10-04 LAB
25(OH)D3 SERPL-MCNC: 21.1 NG/ML (ref 30–100)
ALBUMIN SERPL-MCNC: 2.9 G/DL (ref 3.5–5.2)
ALP SERPL-CCNC: 48 U/L (ref 40–129)
ALT SERPL-CCNC: 7 U/L (ref 0–40)
ANION GAP SERPL CALCULATED.3IONS-SCNC: 15 MMOL/L (ref 7–16)
AST SERPL-CCNC: 17 U/L (ref 0–39)
BILIRUB SERPL-MCNC: 0.5 MG/DL (ref 0–1.2)
BUN SERPL-MCNC: 21 MG/DL (ref 6–23)
CALCIUM SERPL-MCNC: 7.9 MG/DL (ref 8.6–10.2)
CHLORIDE SERPL-SCNC: 97 MMOL/L (ref 98–107)
CHOLEST SERPL-MCNC: 101 MG/DL
CO2 SERPL-SCNC: 20 MMOL/L (ref 22–29)
CREAT SERPL-MCNC: 1.3 MG/DL (ref 0.7–1.2)
DATE LAST DOSE: ABNORMAL
DIGOXIN DOSE TIME: ABNORMAL
DIGOXIN DOSE: ABNORMAL MG
DIGOXIN SERPL-MCNC: <0.3 NG/ML (ref 0.8–2)
ERYTHROCYTE [DISTWIDTH] IN BLOOD BY AUTOMATED COUNT: 13.1 % (ref 11.5–15)
GFR SERPL CREATININE-BSD FRML MDRD: 51 ML/MIN/1.73M2
GLUCOSE SERPL-MCNC: 143 MG/DL (ref 74–99)
HCT VFR BLD AUTO: 35.3 % (ref 37–54)
HDLC SERPL-MCNC: 41 MG/DL
HGB BLD-MCNC: 11.5 G/DL (ref 12.5–16.5)
LDLC SERPL CALC-MCNC: 44 MG/DL
MCH RBC QN AUTO: 30.4 PG (ref 26–35)
MCHC RBC AUTO-ENTMCNC: 32.6 G/DL (ref 32–34.5)
MCV RBC AUTO: 93.4 FL (ref 80–99.9)
PLATELET # BLD AUTO: 116 K/UL (ref 130–450)
PMV BLD AUTO: 11.7 FL (ref 7–12)
POTASSIUM SERPL-SCNC: 4.1 MMOL/L (ref 3.5–5)
PROT SERPL-MCNC: 5.5 G/DL (ref 6.4–8.3)
RBC # BLD AUTO: 3.78 M/UL (ref 3.8–5.8)
SODIUM SERPL-SCNC: 132 MMOL/L (ref 132–146)
TRIGL SERPL-MCNC: 82 MG/DL
VLDLC SERPL CALC-MCNC: 16 MG/DL
WBC OTHER # BLD: 9.6 K/UL (ref 4.5–11.5)

## 2023-10-04 PROCEDURE — 99305 1ST NF CARE MODERATE MDM 35: CPT | Performed by: STUDENT IN AN ORGANIZED HEALTH CARE EDUCATION/TRAINING PROGRAM

## 2023-10-04 NOTE — PROGRESS NOTES
Vandana Coronado (:  1934) is a 80 y.o. male. Subjective   SUBJECTIVE/OBJECTIVE:  Past Medical History:   Diagnosis Date    Basal cell carcinoma of skin     Benign essential hypertension     Chronic kidney disease (CKD), stage III (moderate) (HCC)     Impaired fasting glucose     Osteoarthrosis involving multiple sites       Past Surgical History:   Procedure Laterality Date    HIP SURGERY Right 10/1/2023    RIGHT HIP HEMIARTHROPLASTY performed by Danita Nguyen DO at [address] [facility],[address] Floor N/A 2018    LAPAROSCOPIC APPENDECTOMY performed by David De Leon MD at [address] [facility]  2005    right upper back,       Family History   Problem Relation Age of Onset    Heart Disease Father     Heart Disease Son 62        myocarditis leading to heart transplant    Colon Cancer Mother 80      Social History     Socioeconomic History    Marital status:    Tobacco Use    Smoking status: Never    Smokeless tobacco: Former     Types: Chew     Quit date: 4/10/2014   Substance and Sexual Activity    Alcohol use: Not Currently    Drug use: No    Sexual activity: Not Currently     Partners: Female        HPI    Vandana Coronado is a very pleasant and cooperative 80year old male. Hospital course discussed. He states he fell and broke his right hip and required surgery. Currently, patient states pain is tolerable on his current pain regimen. He is having some decreased appetite, nausea, and constipation and also some chills. He denies any chest pain, SOB, or issues with urination. Allergies   Allergen Reactions    Ace Inhibitors     Amlodipine     Norvasc [Amlodipine Besylate]     Sulfa Antibiotics Rash        Review of Systems - as per HPI        Objective   Physical Exam  Constitutional:       Appearance: He is well-developed. He is obese. HENT:      Head: Normocephalic and atraumatic. Eyes:      General:         Right eye: No discharge.

## 2023-10-05 LAB — SURGICAL PATHOLOGY REPORT: NORMAL

## 2023-10-05 NOTE — DISCHARGE SUMMARY
[address]                  [address] [facility], [address] [place] [address]                               DISCHARGE SUMMARY    PATIENT NAME: Cristina Gonzalez                :        1934  MED REC NO:   [id number]                            ROOM:       0730  ACCOUNT NO:   [de-identified]                           ADMIT DATE: 2023  PROVIDER:     Gonzalo García MD                  [facility] DATE: 10/03/2023    FINAL DIAGNOSES:  1. Right hip fracture requiring a right hip hemiarthroplasty. 2.  Hypertension. 3.  Acute renal insufficiency. COURSE OF ILLNESS:  This is a 80-year-old gentleman who had a fall at  home. He landed on his ribs, his right hip and subsequently was found  to have broken his hip. Once evaluated in the emergency room, he was  seen by Orthopedics, who scheduled him for a right hip hemiarthroplasty. This was accomplished without any significant complications. He did not  have any significant blood loss anemia. He did not require any  postprocedural transfusion. He did not have any chest pains,  palpitations, shortness of breath, orthopnea, or PND or any other  cardiac complications before or after his procedure. His hypertension  was adequately controlled. He did have some mild renal insufficiency  upon presentation and this improved throughout the admission and was at  his baseline at the time of discharge. He was given some IV fluids for  this. The patient was seen and evaluated by Physical Therapy and  Occupational Therapy and recommendations for subacute rehab was made. He was felt to be medically stable and was discharged to [facility]  on 10/03/2023 to receive his subacute rehabilitation. He is felt to be  medically stable at the time of discharge. His discharge medications  are limited to Percocet 5/325 every six hours as needed, aspirin 325 mg  a day, and atorvastatin 10 mg daily.   His usual blood pressure medicines  were

## 2023-10-11 LAB
ALBUMIN SERPL-MCNC: 3.2 G/DL (ref 3.5–5.2)
ALP SERPL-CCNC: 69 U/L (ref 40–129)
ALT SERPL-CCNC: 20 U/L (ref 0–40)
ANION GAP SERPL CALCULATED.3IONS-SCNC: 11 MMOL/L (ref 7–16)
AST SERPL-CCNC: 21 U/L (ref 0–39)
BILIRUB SERPL-MCNC: 0.4 MG/DL (ref 0–1.2)
BUN SERPL-MCNC: 24 MG/DL (ref 6–23)
CALCIUM SERPL-MCNC: 8.7 MG/DL (ref 8.6–10.2)
CHLORIDE SERPL-SCNC: 102 MMOL/L (ref 98–107)
CO2 SERPL-SCNC: 25 MMOL/L (ref 22–29)
CREAT SERPL-MCNC: 1.2 MG/DL (ref 0.7–1.2)
ERYTHROCYTE [DISTWIDTH] IN BLOOD BY AUTOMATED COUNT: 13.5 % (ref 11.5–15)
GFR SERPL CREATININE-BSD FRML MDRD: 57 ML/MIN/1.73M2
GLUCOSE SERPL-MCNC: 141 MG/DL (ref 74–99)
HCT VFR BLD AUTO: 35.9 % (ref 37–54)
HGB BLD-MCNC: 11.7 G/DL (ref 12.5–16.5)
MCH RBC QN AUTO: 30.2 PG (ref 26–35)
MCHC RBC AUTO-ENTMCNC: 32.6 G/DL (ref 32–34.5)
MCV RBC AUTO: 92.8 FL (ref 80–99.9)
PLATELET # BLD AUTO: 313 K/UL (ref 130–450)
PMV BLD AUTO: 10.1 FL (ref 7–12)
POTASSIUM SERPL-SCNC: 4.6 MMOL/L (ref 3.5–5)
PROT SERPL-MCNC: 5.5 G/DL (ref 6.4–8.3)
RBC # BLD AUTO: 3.87 M/UL (ref 3.8–5.8)
SODIUM SERPL-SCNC: 138 MMOL/L (ref 132–146)
WBC OTHER # BLD: 11 K/UL (ref 4.5–11.5)

## 2023-10-12 DIAGNOSIS — S72.001A HIP FRACTURE REQUIRING OPERATIVE REPAIR, RIGHT, CLOSED, INITIAL ENCOUNTER (HCC): Primary | ICD-10-CM

## 2023-10-17 ENCOUNTER — OFFICE VISIT (OUTPATIENT)
Dept: ORTHOPEDIC SURGERY | Age: 88
End: 2023-10-17

## 2023-10-17 DIAGNOSIS — M15.9 PRIMARY OSTEOARTHRITIS INVOLVING MULTIPLE JOINTS: Primary | ICD-10-CM

## 2023-10-17 DIAGNOSIS — M25.561 RIGHT KNEE PAIN, UNSPECIFIED CHRONICITY: ICD-10-CM

## 2023-10-17 PROCEDURE — 99024 POSTOP FOLLOW-UP VISIT: CPT | Performed by: PHYSICIAN ASSISTANT

## 2023-10-17 NOTE — PROGRESS NOTES
Chief Complaint   Patient presents with    Post-Op Check     Right hip cristela 10/1/23. Having pain in the right knee. Hip is overall doing well. Staples removed today and steri strips applied. Skin is slightly red        OP:SURGEON: Dr. Margarita Ramey, DO  DATE OF PROCEDURE: 10-1-23  PROCEDURE: RIGHT HIP HEMIARTHROPLASTY    POD: 2 weeks    Subjective:  Karen Pickett is following up from the above surgery. He is WBAT on right lower extremity. He ambulates with assistive device, walker. Pain to extremity is none and is not taking prescribed pain medication. They denies numbness or tingling to the right lower extremity. Denies calf pain, chest pain, or shortness of breath. Patient continues to use DVT prophylaxis,  mg BID. Patient is participating in therapy, home health care . Patient is doing well after surgery. Denies any pain or problems in the right hip. Denies incisional issues. Does complain of some intermittent soreness in the right knee. Review of Systems -  All pertinent positives/negative in HPI     Objective:    General: Alert and oriented X 3, normocephalic atraumatic, external ears and eye normal, sclera clear, no acute distress, respirations easy and unlabored with no audible wheezes, skin warm and dry, speech and dress appropriate for noted age, affect euthymic. Extremity:  Right Lower Extremity  Skin clean dry and intact, without signs of infection   Incision well-healed, sutures removed and Steri-Strips applied  no edema noted  Compartments supple throughout thigh and leg  Calf supple and not tender  negative Homans  Demonstrates active motion with HF, knee flexion/extension and ankle DF/PF  States sensation intact to touch in sural, deep peroneal, superficial peroneal, saphenous, posterior tibial  nerve distributions to foot/ankle. Palpable dorsalis pedis and posterior tibialis pulses, cap refill brisk in toes, foot warm/perfused.     There were no vitals taken for this

## 2023-10-17 NOTE — PATIENT INSTRUCTIONS
Weightbearing as tolerated right lower extremity. If Steri-Strips do not fall off right hip incision in 7 days on their own, okay to remove. Continue aspirin for DVT prophylaxis until 28 days postop. Follow-up in 4 weeks for reevaluation and x-rays. Call if any questions or concerns.

## 2023-11-14 ENCOUNTER — OFFICE VISIT (OUTPATIENT)
Dept: ORTHOPEDIC SURGERY | Age: 88
End: 2023-11-14

## 2023-11-14 VITALS — BODY MASS INDEX: 33.64 KG/M2 | WEIGHT: 235 LBS | HEIGHT: 70 IN

## 2023-11-14 DIAGNOSIS — S72.001A HIP FRACTURE REQUIRING OPERATIVE REPAIR, RIGHT, CLOSED, INITIAL ENCOUNTER (HCC): Primary | ICD-10-CM

## 2023-11-14 PROCEDURE — 99024 POSTOP FOLLOW-UP VISIT: CPT | Performed by: ORTHOPAEDIC SURGERY

## 2023-11-14 RX ORDER — ASPIRIN 81 MG/1
81 TABLET ORAL DAILY
COMMUNITY

## 2023-11-14 NOTE — PROGRESS NOTES
Chief Complaint   Patient presents with    Post-Op Check     6 week post-op right hip cristela 10/1/2023. Doing well. Knee Pain     Right knee pain and weakness. OP:SURGEON: Dr. Yolanda Hoyt,   DATE OF PROCEDURE: 10-1-23  PROCEDURE: RIGHT HIP HEMIARTHROPLASTY    POD: 6 weeks    Subjective:  López Lam is following up from the above surgery. He is WBAT on right lower extremity. He ambulates with assistive device, walker and started transition to the cane at the household. States typically he will use 2 canes. .  Pain to extremity is none and is not taking prescribed pain medication. They denies numbness or tingling to the right lower extremity. Denies calf pain, chest pain, or shortness of breath. Patient continues to use DVT prophylaxis,  mg BID. Patient finishes home therapy and recently listed in the [employer] program for further outpatient therapy and exercise. Feels that he is doing well. Denies any pain or problems in the right hip. Denies incisional issues. Does complain of some intermittent soreness in the right knee, mostly the inner joint line which has been present for some time even prior to recent hip fracture and surgery. He uses no bracing or has done no treatment for the knee. .     Review of Systems -  All pertinent positives/negative in HPI     Objective:    General: Alert and oriented X 3, normocephalic atraumatic, external ears and eye normal, sclera clear, no acute distress, respirations easy and unlabored with no audible wheezes, skin warm and dry, speech and dress appropriate for noted age, affect euthymic.     Extremity:  Right Lower Extremity  Surgical incision is healing well, no signs of infection, no erythema or warmth, no fluctuance  Passive ROM of hip without pain, mild Trendelenburg gait with ambulation  Compartments supple throughout thigh and leg  Calf supple and not tender  negative Homans  Mild genu varum deformity, joint line tenderness

## 2024-01-02 DIAGNOSIS — S72.001A HIP FRACTURE REQUIRING OPERATIVE REPAIR, RIGHT, CLOSED, INITIAL ENCOUNTER (HCC): Primary | ICD-10-CM

## 2024-01-04 ENCOUNTER — HOSPITAL ENCOUNTER (OUTPATIENT)
Dept: GENERAL RADIOLOGY | Age: 89
Discharge: HOME | End: 2024-01-06
Payer: MEDICARE

## 2024-01-04 ENCOUNTER — OFFICE VISIT (OUTPATIENT)
Dept: ORTHOPEDIC SURGERY | Age: 89
End: 2024-01-04
Payer: MEDICARE

## 2024-01-04 DIAGNOSIS — S72.001A HIP FRACTURE REQUIRING OPERATIVE REPAIR, RIGHT, CLOSED, INITIAL ENCOUNTER (HCC): Primary | ICD-10-CM

## 2024-01-04 DIAGNOSIS — S72.001A HIP FRACTURE REQUIRING OPERATIVE REPAIR, RIGHT, CLOSED, INITIAL ENCOUNTER (HCC): ICD-10-CM

## 2024-01-04 DIAGNOSIS — Z96.641 HISTORY OF RIGHT HIP HEMIARTHROPLASTY: ICD-10-CM

## 2024-01-04 PROCEDURE — 99213 OFFICE O/P EST LOW 20 MIN: CPT | Performed by: PHYSICIAN ASSISTANT

## 2024-01-04 PROCEDURE — 1123F ACP DISCUSS/DSCN MKR DOCD: CPT | Performed by: PHYSICIAN ASSISTANT

## 2024-01-04 PROCEDURE — 73502 X-RAY EXAM HIP UNI 2-3 VIEWS: CPT

## 2024-01-04 PROCEDURE — 99212 OFFICE O/P EST SF 10 MIN: CPT

## 2024-01-04 RX ORDER — OXYCODONE HYDROCHLORIDE AND ACETAMINOPHEN 5; 325 MG/1; MG/1
1 TABLET ORAL EVERY 4 HOURS PRN
COMMUNITY
Start: 2023-04-12

## 2024-01-04 NOTE — PROGRESS NOTES
Chief Complaint   Patient presents with    Post-Op Check     6WK PO SE R Hip [place]. Pt ambulating with single cane. Pt states no pain at this time.         OP:SURGEON: Dr. Tam Ross,   DATE OF PROCEDURE: 10-1-23  PROCEDURE: RIGHT HIP HEMIARTHROPLASTY     POD: 3+ months    Subjective:  Donny Vick is following up from the above surgery. He is WBAT on right lower extremity. He ambulates with assistive device, cane.  Pain to extremity is none and is not taking prescribed pain medication. They denies numbness or tingling to the right lower extremity. Denies calf pain, chest pain, or shortness of breath.  Patient has finished DVT prophylaxis. Patient is not participating in therapy at this time.  He is doing well after surgery.  Denies any pain or problems in the hip/groin area.  He is interested in starting a therapy maintenance program.     Review of Systems -  All pertinent positives/negative in HPI     Objective:    General: Alert and oriented X 3, normocephalic atraumatic, external ears and eye normal, sclera clear, no acute distress, respirations easy and unlabored with no audible wheezes, skin warm and dry, speech and dress appropriate for noted age, affect euthymic.    Extremity:  Right Lower Extremity  Skin clean dry and intact, without signs of infection   Incision well-healed  no edema noted  Compartments supple throughout thigh and leg  Calf supple and not tender  negative Homans  Demonstrates active motion with HF, knee flexion/extension and ankle DF/PF  Ambulates well using a cane  States sensation intact to touch in sural, deep peroneal, superficial peroneal, saphenous, posterior tibial  nerve distributions to foot/ankle.  Palpable dorsalis pedis and posterior tibialis pulses, cap refill brisk in toes, foot warm/perfused.    There were no vitals taken for this visit.    XR:   Pelvis and right hip films demonstrate s/p right hip hemiarthroplasty with hardware in stable position and alignment.

## 2024-04-23 ENCOUNTER — TELEPHONE (OUTPATIENT)
Dept: ADMINISTRATIVE | Age: 89
End: 2024-04-23

## 2024-04-23 NOTE — TELEPHONE ENCOUNTER
Patient Appointment Form:      PCP: Conrado  Referring: pcp    Has the Patient:    Seen a Cardiologist? yes    date:2014  Physician:Ricardo  location:[facility]    Had a heart catheterization? no    Had heart surgery? no    Had a stress test or nuclear stress test? yes   date: 2014   facility name:  [facility]    Had an echocardiogram? no    Had a vascular ultrasound? no    Had a 24/48 heart monitor or extended cardiac event monitor? no    Had recent blood work in the last 6 months? yes    date: 03/01/2024    ordering physician: pcp    Had a pacemaker/ICD/ILR implant? no    Seen an Electrophysiologist? no        Will send records via: in Epic      Date & time of appointment:  terra elkins/ Nikita 5/6 at 8

## 2024-05-03 PROBLEM — U07.1 PNEUMONIA DUE TO COVID-19 VIRUS: Status: RESOLVED | Noted: 2020-11-27 | Resolved: 2024-05-03

## 2024-05-03 PROBLEM — J12.82 PNEUMONIA DUE TO COVID-19 VIRUS: Status: RESOLVED | Noted: 2020-11-27 | Resolved: 2024-05-03

## 2024-05-03 PROBLEM — S72.001A HIP FRACTURE REQUIRING OPERATIVE REPAIR, RIGHT, CLOSED, INITIAL ENCOUNTER (HCC): Status: RESOLVED | Noted: 2023-09-30 | Resolved: 2024-05-03

## 2024-05-03 PROBLEM — R09.02 HYPOXIA: Status: RESOLVED | Noted: 2021-04-07 | Resolved: 2024-05-03

## 2024-05-03 PROBLEM — K37 APPENDICITIS: Status: RESOLVED | Noted: 2018-12-04 | Resolved: 2024-05-03

## 2024-05-03 PROBLEM — S72.001A CLOSED FRACTURE OF NECK OF RIGHT FEMUR, INITIAL ENCOUNTER (HCC): Status: RESOLVED | Noted: 2023-09-30 | Resolved: 2024-05-03

## 2024-05-06 ENCOUNTER — OFFICE VISIT (OUTPATIENT)
Dept: CARDIOLOGY CLINIC | Age: 89
End: 2024-05-06
Payer: MEDICARE

## 2024-05-06 VITALS
RESPIRATION RATE: 18 BRPM | WEIGHT: 238.8 LBS | HEIGHT: 70 IN | HEART RATE: 96 BPM | BODY MASS INDEX: 34.19 KG/M2 | DIASTOLIC BLOOD PRESSURE: 72 MMHG | SYSTOLIC BLOOD PRESSURE: 148 MMHG

## 2024-05-06 DIAGNOSIS — R55 NEAR SYNCOPE: ICD-10-CM

## 2024-05-06 DIAGNOSIS — I44.4 LAFB (LEFT ANTERIOR FASCICULAR BLOCK): Primary | ICD-10-CM

## 2024-05-06 PROBLEM — E78.5 HLD (HYPERLIPIDEMIA): Status: ACTIVE | Noted: 2024-05-06

## 2024-05-06 PROCEDURE — 93000 ELECTROCARDIOGRAM COMPLETE: CPT | Performed by: INTERNAL MEDICINE

## 2024-05-06 PROCEDURE — 1123F ACP DISCUSS/DSCN MKR DOCD: CPT | Performed by: INTERNAL MEDICINE

## 2024-05-06 PROCEDURE — 99204 OFFICE O/P NEW MOD 45 MIN: CPT | Performed by: INTERNAL MEDICINE

## 2024-05-06 RX ORDER — LOSARTAN POTASSIUM 100 MG/1
100 TABLET ORAL DAILY
COMMUNITY

## 2024-05-06 NOTE — PROGRESS NOTES
present.   Cardiovascular: Normal rate, regular rhythm, normal heart sounds and intact distal pulses.  No gallop and no friction rub.  Grade II/VI short FADI heart RUSB and apex.  Pulmonary/Chest: Effort normal and breath sounds normal.   Abdominal: Soft. Bowel sounds are normal. No distension and no mass.   Musculoskeletal: No edema   Neurological: Alert and oriented to person, place, and time.   Skin: Skin is warm and dry.   Psychiatric: Normal mood and affect. Behavior is normal.     EKG:  normal sinus rhythm, left axis deviation, non specific QRS widening.    ASSESSMENT AND PLAN:  Patient Active Problem List   Diagnosis    Chronic kidney disease (CKD), stage III (moderate) (HCC)    Benign essential hypertension    Basal cell carcinoma of skin    Obstructive sleep apnea syndrome    HLD (hyperlipidemia)    LAFB (left anterior fascicular block)    Near syncope       Vasovagal near syncope  Above average functional capacity for age  CV exam and mild AS, congruent with 2023 echo  EKG with benign conduction disease - no suggestion of high grade AVB  No testing needed  Advised aggressive hydration with water during the day  OV cassie Shelton M.D  [facility]

## 2025-02-25 NOTE — CARE COORDINATION
[facility] Transitions Follow Up Call    2021    Patient: Tee Vargas  Patient : 1934   MRN: [id number]  Reason for Admission:   Discharge Date: 20 RARS: Readmission Risk Score: 21         Spoke with: Patient, Ethan Salgado" [facility] Transitions Subsequent and Final Call    Subsequent and Final Calls  Do you have any ongoing symptoms?: Yes  Patient-reported symptoms: Shortness of Breath  -patient reports shortness of breath with exertion; utilizes oxygen 2. 5LNC with activity, SpO2 95%-99%   -ongoing slight swelling in ankles   -denies any worsening in reported symptoms  Do you have any questions related to your medications?: No  Do you have any needs or concerns that I can assist you with?: No  Identified Barriers: None  Care Transitions Interventions  No Identified Needs    Patient is pleasant in conversation.  -denies fever, chills, or chest discomfort   -denies weight gain   -reports good appetite with normal bladder/bowel elimination   -independent with ambulation; denies recent fall   -utilizes CPAP [facility]    Emotional support provided; will continue to follow.        Follow Up  Future Appointments   Date Time Provider Maximo Canales   2021 11:45 AM MD [facility]       Re Najera RN The patient will need a letter allowing her to go to the dentist signed by Dr Albrecht.

## (undated) DEVICE — Device

## (undated) DEVICE — NEEDLE CLOSURE OMNICLOSE

## (undated) DEVICE — ELECTRODE PT RET AD L9FT HI MOIST COND ADH HYDRGEL CORDED

## (undated) DEVICE — PACK PROCEDURE SURG GEN CUST

## (undated) DEVICE — DOUBLE BASIN SET: Brand: MEDLINE INDUSTRIES, INC.

## (undated) DEVICE — PATIENT RETURN ELECTRODE, SINGLE-USE, CONTACT QUALITY MONITORING, ADULT, WITH 9FT CORD, FOR PATIENTS WEIGING OVER 33LBS. (15KG): Brand: MEGADYNE

## (undated) DEVICE — BLADE SAW SAG SYS 6 18X90X1.27MM

## (undated) DEVICE — Z DISCONTINUED NO SUB IDED BAG SPEC RETRV M C240ML MOUTH 7.3MM L17CM SHFT 10MM NYL EZEE

## (undated) DEVICE — 3M™ STERI-DRAPE™ U-DRAPE 1015: Brand: STERI-DRAPE™

## (undated) DEVICE — APPLICATOR MEDICATED 26 CC SOLUTION HI LT ORNG CHLORAPREP

## (undated) DEVICE — SPONGE GZ W4XL4IN RAYON POLY FILL CVR W/ NONWOVEN FAB

## (undated) DEVICE — CHLORAPREP 26ML ORANGE

## (undated) DEVICE — PEEL-AWAY HOOD: Brand: FLYTE, SURGICOOL

## (undated) DEVICE — TUBING, SUCTION, 9/32" X 10', STRAIGHT: Brand: MEDLINE

## (undated) DEVICE — SPONGE LAP W18XL18IN WHT COT 4 PLY FLD STRUNG RADPQ DISP ST 2 PER PACK

## (undated) DEVICE — 4-PORT MANIFOLD: Brand: NEPTUNE 2

## (undated) DEVICE — GOWN,SIRUS,FABRNF,XL,20/CS: Brand: MEDLINE

## (undated) DEVICE — INTENDED FOR TISSUE SEPARATION, AND OTHER PROCEDURES THAT REQUIRE A SHARP SURGICAL BLADE TO PUNCTURE OR CUT.: Brand: BARD-PARKER ® STAINLESS STEEL BLADES

## (undated) DEVICE — SOLUTION IRRIG 3000ML 0.9% SOD CHL USP UROMATIC PLAS CONT

## (undated) DEVICE — GOWN,SIRUS,POLYRNF,BRTHSLV,XL,30/CS: Brand: MEDLINE

## (undated) DEVICE — GLOVE ORANGE PI 8   MSG9080

## (undated) DEVICE — CONTROL SYRINGE LUER-LOCK TIP: Brand: MONOJECT

## (undated) DEVICE — COVER HNDL LT DISP

## (undated) DEVICE — SKIN AFFIX SURG ADHESIVE 72/CS 0.55ML: Brand: MEDLINE

## (undated) DEVICE — TOWEL,OR,DSP,ST,BLUE,STD,6/PK,12PK/CS: Brand: MEDLINE

## (undated) DEVICE — GOWN,SIRUS,FABRNF,L,20/CS: Brand: MEDLINE

## (undated) DEVICE — DRESSING HYDROFIBER AQUACEL AG ADVANTAGE 3.5X10 IN

## (undated) DEVICE — DRAPE,CHEST,FENES,15X10,STERIL: Brand: MEDLINE

## (undated) DEVICE — TROCAR ENDOSCP L100MM DIA5MM DIL TIP STBL SL W OPTVW

## (undated) DEVICE — RELOAD STPL L45MM OPN H4.1MM CLS H2MM THCK TISS GRN 4 ROW

## (undated) DEVICE — BAG SPECIMEN BIOHAZARD 6IN X 9IN

## (undated) DEVICE — GLOVE ORTHO 8   MSG9480

## (undated) DEVICE — NEEDLE HYPO 18GA L1.5IN PNK POLYPR HUB S STL REG BVL STR

## (undated) DEVICE — Z INACTIVE USE 2660664 SOLUTION IRRIG 3000ML 0.9% SOD CHL USP UROMATIC PLAS CONT

## (undated) DEVICE — TROCAR ENDOSCP L100MM DIA5MM BLDELSS STBL SL OBT RADLUC

## (undated) DEVICE — SEALER TISS L45CM ADV BPLR CRV TIP LAP APPRCH ENSEAL G2

## (undated) DEVICE — Z DISCONTINUED BY MEDLINE USE 2661081 SHEARS SEAL L20CM DIA5MM ULTRASONIC CRV TIP HARM HD 1000I

## (undated) DEVICE — DRAIN SURG 15FR SIL RND CHN W/ TRCR FULL FLUT DBL WRP TRAD

## (undated) DEVICE — HANDPIECE SET WITH COAXIAL HIGH FLOW TIP AND SUCTION TUBE: Brand: INTERPULSE

## (undated) DEVICE — TOTAL HIP PK

## (undated) DEVICE — TRAP SPEC MUCUS FOR SUCT

## (undated) DEVICE — 1000 S-DRAPE TOWEL DRAPE 10/BX: Brand: STERI-DRAPE™

## (undated) DEVICE — SCISSORS ENDOSCP DIA5MM CRV MPLR CAUT W/ RATCH HNDL

## (undated) DEVICE — TOTAL TRAY, DB, 100% SILI FOLEY, 16FR 10: Brand: MEDLINE

## (undated) DEVICE — KIT,ANTI FOG,W/SPONGE & FLUID,SOFT PACK: Brand: MEDLINE

## (undated) DEVICE — PUMP SUC IRR TBNG L10FT W/ HNDPC ASSEMB STRYKEFLOW 2

## (undated) DEVICE — INSUFFLATION TUBING SET WITH FILTER, FUNNEL CONNECTOR AND LUER LOCK: Brand: JOSNOE MEDICAL INC

## (undated) DEVICE — NEEDLE INSUF L120MM DIA2MM DISP FOR PNEUMOPERI ENDOPATH

## (undated) DEVICE — SYRINGE MED 30ML STD CLR PLAS LUERLOCK TIP N CTRL DISP

## (undated) DEVICE — BLADE CLIPPER GEN PURP NS

## (undated) DEVICE — WIPES SKIN CLOTH READYPREP 9 X 10.5 IN 2% CHLORHEX GLUCONATE CHG PREOP

## (undated) DEVICE — CUTTER ENDOSCP L340MM LIN ARTC SGL STROKE FIRING ENDOPATH

## (undated) DEVICE — SYRINGE IRRIG 60ML SFT PLIABLE BLB EZ TO GRP 1 HND USE W/

## (undated) DEVICE — TROCAR ENDOSCP L100MM DIA12MM DIL TIP OBT RADLUC STBL SL

## (undated) DEVICE — FORCEPS MARYLAND DISSECTOR